# Patient Record
Sex: FEMALE | Race: WHITE | NOT HISPANIC OR LATINO | Employment: OTHER | URBAN - METROPOLITAN AREA
[De-identification: names, ages, dates, MRNs, and addresses within clinical notes are randomized per-mention and may not be internally consistent; named-entity substitution may affect disease eponyms.]

---

## 2018-04-16 ENCOUNTER — TRANSFERRED RECORDS (OUTPATIENT)
Dept: HEALTH INFORMATION MANAGEMENT | Facility: CLINIC | Age: 63
End: 2018-04-16

## 2018-08-02 ENCOUNTER — TRANSFERRED RECORDS (OUTPATIENT)
Dept: HEALTH INFORMATION MANAGEMENT | Facility: CLINIC | Age: 63
End: 2018-08-02

## 2018-08-02 ENCOUNTER — OFFICE VISIT (OUTPATIENT)
Dept: FAMILY MEDICINE | Facility: CLINIC | Age: 63
End: 2018-08-02
Payer: COMMERCIAL

## 2018-08-02 ENCOUNTER — TELEPHONE (OUTPATIENT)
Dept: FAMILY MEDICINE | Facility: CLINIC | Age: 63
End: 2018-08-02

## 2018-08-02 VITALS
BODY MASS INDEX: 43.77 KG/M2 | HEART RATE: 80 BPM | TEMPERATURE: 98.6 F | DIASTOLIC BLOOD PRESSURE: 86 MMHG | RESPIRATION RATE: 20 BRPM | WEIGHT: 247 LBS | SYSTOLIC BLOOD PRESSURE: 154 MMHG | HEIGHT: 63 IN

## 2018-08-02 DIAGNOSIS — Z53.20 PAP SMEAR OF CERVIX DECLINED: ICD-10-CM

## 2018-08-02 DIAGNOSIS — E66.01 MORBID OBESITY WITH BMI OF 40.0-44.9, ADULT (H): ICD-10-CM

## 2018-08-02 DIAGNOSIS — R81 GLUCOSURIA: ICD-10-CM

## 2018-08-02 DIAGNOSIS — Z53.20 MAMMOGRAM DECLINED: ICD-10-CM

## 2018-08-02 DIAGNOSIS — I10 BENIGN ESSENTIAL HYPERTENSION: ICD-10-CM

## 2018-08-02 DIAGNOSIS — R30.0 DYSURIA: Primary | ICD-10-CM

## 2018-08-02 DIAGNOSIS — Z53.20 COLONOSCOPY REFUSED: ICD-10-CM

## 2018-08-02 DIAGNOSIS — Z72.0 TOBACCO USE: ICD-10-CM

## 2018-08-02 PROBLEM — J98.01 BRONCHOSPASM: Status: RESOLVED | Noted: 2018-08-02 | Resolved: 2018-08-02

## 2018-08-02 PROBLEM — J98.01 BRONCHOSPASM: Status: ACTIVE | Noted: 2018-08-02

## 2018-08-02 PROBLEM — J30.1 CHRONIC SEASONAL ALLERGIC RHINITIS DUE TO POLLEN: Status: ACTIVE | Noted: 2018-08-02

## 2018-08-02 LAB
ALBUMIN SERPL-MCNC: 2.5 G/DL (ref 3.4–5)
ALBUMIN UR-MCNC: 30 MG/DL
ALP SERPL-CCNC: 286 U/L (ref 40–150)
ALT SERPL W P-5'-P-CCNC: 161 U/L (ref 0–50)
ANION GAP SERPL CALCULATED.3IONS-SCNC: 8 MMOL/L (ref 3–14)
APPEARANCE UR: CLEAR
AST SERPL W P-5'-P-CCNC: 76 U/L (ref 0–45)
BACTERIA #/AREA URNS HPF: ABNORMAL /HPF
BILIRUB SERPL-MCNC: 14.9 MG/DL (ref 0.2–1.3)
BILIRUB UR QL STRIP: ABNORMAL
BUN SERPL-MCNC: 14 MG/DL (ref 7–30)
CALCIUM SERPL-MCNC: 8.4 MG/DL (ref 8.5–10.1)
CHLORIDE SERPL-SCNC: 100 MMOL/L (ref 94–109)
CO2 SERPL-SCNC: 27 MMOL/L (ref 20–32)
COLOR UR AUTO: ABNORMAL
CREAT SERPL-MCNC: 0.5 MG/DL (ref 0.52–1.04)
GFR SERPL CREATININE-BSD FRML MDRD: >90 ML/MIN/1.7M2
GLUCOSE BLD-MCNC: 93 MG/DL (ref 70–99)
GLUCOSE SERPL-MCNC: 92 MG/DL (ref 70–99)
GLUCOSE UR STRIP-MCNC: 100 MG/DL
HBA1C MFR BLD: 5.6 % (ref 0–5.6)
HGB UR QL STRIP: NEGATIVE
KETONES UR STRIP-MCNC: NEGATIVE MG/DL
LEUKOCYTE ESTERASE UR QL STRIP: NEGATIVE
NITRATE UR QL: NEGATIVE
PH UR STRIP: 6 PH (ref 5–7)
POTASSIUM SERPL-SCNC: 3 MMOL/L (ref 3.4–5.3)
PROT SERPL-MCNC: 7 G/DL (ref 6.8–8.8)
RBC #/AREA URNS AUTO: ABNORMAL /HPF
SODIUM SERPL-SCNC: 135 MMOL/L (ref 133–144)
SOURCE: ABNORMAL
SP GR UR STRIP: 1.02 (ref 1–1.03)
TSH SERPL DL<=0.005 MIU/L-ACNC: 0.98 MU/L (ref 0.4–4)
UROBILINOGEN UR STRIP-ACNC: 1 EU/DL (ref 0.2–1)
WBC #/AREA URNS AUTO: ABNORMAL /HPF

## 2018-08-02 PROCEDURE — 82947 ASSAY GLUCOSE BLOOD QUANT: CPT | Mod: 59 | Performed by: NURSE PRACTITIONER

## 2018-08-02 PROCEDURE — 81001 URINALYSIS AUTO W/SCOPE: CPT | Performed by: NURSE PRACTITIONER

## 2018-08-02 PROCEDURE — 84443 ASSAY THYROID STIM HORMONE: CPT | Performed by: NURSE PRACTITIONER

## 2018-08-02 PROCEDURE — 83036 HEMOGLOBIN GLYCOSYLATED A1C: CPT | Performed by: NURSE PRACTITIONER

## 2018-08-02 PROCEDURE — 36415 COLL VENOUS BLD VENIPUNCTURE: CPT | Performed by: NURSE PRACTITIONER

## 2018-08-02 PROCEDURE — 87086 URINE CULTURE/COLONY COUNT: CPT | Performed by: NURSE PRACTITIONER

## 2018-08-02 PROCEDURE — 99204 OFFICE O/P NEW MOD 45 MIN: CPT | Performed by: NURSE PRACTITIONER

## 2018-08-02 PROCEDURE — 80053 COMPREHEN METABOLIC PANEL: CPT | Performed by: NURSE PRACTITIONER

## 2018-08-02 RX ORDER — HYDROCHLOROTHIAZIDE 12.5 MG/1
25 TABLET ORAL DAILY
Qty: 30 TABLET | Refills: 0 | Status: SHIPPED | OUTPATIENT
Start: 2018-08-02 | End: 2018-08-21

## 2018-08-02 RX ORDER — ALBUTEROL SULFATE 90 UG/1
2 AEROSOL, METERED RESPIRATORY (INHALATION) EVERY 6 HOURS
Qty: 1 INHALER | Refills: 0 | Status: CANCELLED | OUTPATIENT
Start: 2018-08-02

## 2018-08-02 RX ORDER — LORATADINE 10 MG/1
10 TABLET ORAL DAILY
COMMUNITY

## 2018-08-02 NOTE — NURSING NOTE
"Chief Complaint   Patient presents with     Urinary Problem       Initial /86 (BP Location: Right arm, Patient Position: Sitting, Cuff Size: Adult Large)  Pulse 80  Temp 98.6  F (37  C) (Tympanic)  Resp 20  Ht 5' 3\" (1.6 m)  Wt 247 lb (112 kg)  BMI 43.75 kg/m2 Estimated body mass index is 43.75 kg/(m^2) as calculated from the following:    Height as of this encounter: 5' 3\" (1.6 m).    Weight as of this encounter: 247 lb (112 kg).      Health Maintenance that is potentially due pending provider review:  Mammogram, Pap Smear and Colonoscopy/FIT    Pt declines all.    Is there anyone who you would like to be able to receive your results? No  If yes have patient fill out LEONA    "

## 2018-08-02 NOTE — TELEPHONE ENCOUNTER
Received records from [facility]. Given to Mouna to review.    Tamar [facility] [place]

## 2018-08-02 NOTE — MR AVS SNAPSHOT
After Visit Summary   8/2/2018    Marlin Harman    MRN: [id number]           Patient Information     Date Of Birth          1955        Visit Information        Provider Department      8/2/2018 11:00 AM Mouna Concepcion CNP [facility]        Today's Diagnoses     Dysuria    -  1    Benign essential hypertension        Morbid obesity with BMI of 40.0-44.9, adult (H)        Tobacco use        Glucosuria          Care Instructions    1. Dysuria  Acute  - *UA reflex to Microscopic and Culture ([facility] (except [place] and [place])  - Urine Microscopic  - Wet prep  - Urine Culture Aerobic Bacterial  - Pyridum or AZO over-the-counter     2. Benign essential hypertension  Chronic, uncontrolled  - TSH with free T4 reflex  - hydrochlorothiazide 12.5 MG TABS tablet; Take 2 tablets (25 mg) by mouth daily NO FURTHER REFILL  Dispense: 30 tablet; Refill: 0  - Comprehensive metabolic panel    3. Morbid obesity with BMI of 40.0-44.9, adult (H)  Chronic, stable  Recommend regular activity    4. Tobacco use  Chronic, stable  Recommend smoking cessation    5. Glucosuria  Acute  - Hemoglobin A1c  - Glucose, whole blood  Results for orders placed or performed in visit on 08/02/18   *UA reflex to Microscopic and Culture ([facility] (except [place] and [place])   Result Value Ref Range    Color Urine Irma     Appearance Urine Clear     Glucose Urine 100 (A) NEG^Negative mg/dL    Bilirubin Urine Large (A) NEG^Negative    Ketones Urine Negative NEG^Negative mg/dL    Specific Gravity Urine 1.020 1.003 - 1.035    Blood Urine Negative NEG^Negative    pH Urine 6.0 5.0 - 7.0 pH    Protein Albumin Urine 30 (A) NEG^Negative mg/dL    Urobilinogen Urine 1.0 0.2 - 1.0 EU/dL    Nitrite Urine Negative NEG^Negative    Leukocyte Esterase Urine Negative NEG^Negative    Source Catheterized Urine    Urine Microscopic   Result Value Ref Range    WBC Urine 0 - 5 OTO5^0 -  "5 /HPF    RBC Urine O - 2 OTO2^O - 2 /HPF    Bacteria Urine Few (A) NEG^Negative /HPF   Hemoglobin A1c   Result Value Ref Range    Hemoglobin A1C 5.6 0 - 5.6 %   Glucose, whole blood   Result Value Ref Range    Glucose Whole Blood 93 70 - 99 mg/dL               Follow-ups after your visit        Who to contact     If you have questions or need follow up information about today's clinic visit or your schedule please contact [facility] directly at [phone].  Normal or non-critical lab and imaging results will be communicated to you by MyChart, letter or phone within 4 business days after the clinic has received the results. If you do not hear from us within 7 days, please contact the clinic through MyChart or phone. If you have a critical or abnormal lab result, we will notify you by phone as soon as possible.  Submit refill requests through [employer] or call your pharmacy and they will forward the refill request to us. Please allow 3 business days for your refill to be completed.          Additional Information About Your Visit        Care EveryWhere ID     This is your Care EveryWhere ID. This could be used by other organizations to access your [facility] medical records  [id number]        Your Vitals Were     Pulse Temperature Respirations Height BMI (Body Mass Index)       80 98.6  F (37  C) (Tympanic) 20 5' 3\" (1.6 m) 43.75 kg/m2        Blood Pressure from Last 3 Encounters:   08/02/18 154/86    Weight from Last 3 Encounters:   08/02/18 247 lb (112 kg)              We Performed the Following     *UA reflex to Microscopic and Culture ([place] [facility] (except [place] and [place])     Comprehensive metabolic panel     Glucose, whole blood     Hemoglobin A1c     TSH with free T4 reflex     Urine Culture Aerobic Bacterial     Urine Microscopic     Wet prep          Today's Medication Changes          These changes are accurate as of 8/2/18  1:16 PM.  If you have any questions, ask your " nurse or doctor.               Start taking these medicines.        Dose/Directions    hydrochlorothiazide 12.5 MG Tabs tablet   Used for:  Benign essential hypertension   Started by:  Mouna Concepcion CNP        Dose:  25 mg   Take 2 tablets (25 mg) by mouth daily NO FURTHER REFILL   Quantity:  30 tablet   Refills:  0            Where to get your medicines      These medications [id number] [place] [place] [facility] [address] [facility] [facility]  [address] , \[facility] [place] [facility]\"" [id number]     Phone:  [phone]     hydrochlorothiazide 12.5 MG Tabs tablet                Primary Care Provider Office Phone # Fax #    Mounakan Concepcion [phone]       [address] [facility] [address]        Equal Access to Services     TIARA MARX AH: Hadii aad ku hadasho Sorogelioali, waaxda luqadaha, qaybta kaalmada adeegyada, paulo alvarez. [facility] [phone].    ATENCIÓN: Si habla español, tiene a lopez disposición servicios gratuitos de asistencia lingüística. [facility] [phone].    We comply with applicable federal civil rights laws and [place] laws. We do not discriminate on the basis of race, color, national origin, age, disability, sex, sexual orientation, or gender identity.            Thank you!     Thank you for choosing [facility]  for your care. Our goal is always to provide you with excellent care. Hearing back from our patients is one way we can continue to improve our services. Please take a few minutes to complete the written survey that you may receive in the mail after your visit with us. Thank you!             Your Updated Medication List - Protect others around you: Learn how to safely use, store and throw away your medicines at www.disposemymeds.org.          This list is accurate as of 8/2/18  1:16 PM.  Always use your most recent med list.                   Brand Name Dispense Instructions for use Diagnosis    hydrochlorothiazide  12.5 MG Tabs tablet     30 tablet    Take 2 tablets (25 mg) by mouth daily NO FURTHER REFILL    Benign essential hypertension       loratadine 10 MG tablet    CLARITIN     Take 10 mg by mouth daily

## 2018-08-02 NOTE — NURSING NOTE
Patient was catheterized for UA/UC today due to inability to void. Obtained sample and sent with verification of name and date of birth. Patient tolerated well.    CHRISSY Sepulveda

## 2018-08-02 NOTE — LETTER
August 7, 2018      Marlin Harman  [address] [place] [address]        Dear ,    We are writing to inform you of your test results.    TSH (thyroid)- normal   CNP- abnormal liver function studies   Urinalysis- no bacteria   HgbA1c- normal   Glucose- normal    Resulted Orders   *UA reflex to Microscopic and Culture ([place] [facility] (except [place] and [place])   Result Value Ref Range    Color Urine Irma     Appearance Urine Clear     Glucose Urine 100 (A) NEG^Negative mg/dL    Bilirubin Urine Large (A) NEG^Negative      Comment:      This is an unconfirmed screening test result. A positive result may be false.    Ketones Urine Negative NEG^Negative mg/dL    Specific Gravity Urine 1.020 1.003 - 1.035    Blood Urine Negative NEG^Negative    pH Urine 6.0 5.0 - 7.0 pH    Protein Albumin Urine 30 (A) NEG^Negative mg/dL    Urobilinogen Urine 1.0 0.2 - 1.0 EU/dL    Nitrite Urine Negative NEG^Negative    Leukocyte Esterase Urine Negative NEG^Negative    Source Catheterized Urine    TSH with free T4 reflex   Result Value Ref Range    TSH 0.98 0.40 - 4.00 mU/L   Comprehensive metabolic panel   Result Value Ref Range    Sodium 135 133 - 144 mmol/L    Potassium 3.0 (L) 3.4 - 5.3 mmol/L    Chloride 100 94 - 109 mmol/L    Carbon Dioxide 27 20 - 32 mmol/L    Anion Gap 8 3 - 14 mmol/L    Glucose 92 70 - 99 mg/dL      Comment:      Non Fasting    Urea Nitrogen 14 7 - 30 mg/dL    Creatinine 0.50 (L) 0.52 - 1.04 mg/dL    GFR Estimate >90 >60 mL/min/1.7m2      Comment:      Non  GFR Calc    GFR Estimate If Black >90 >60 mL/min/1.7m2      Comment:       GFR Calc    Calcium 8.4 (L) 8.5 - 10.1 mg/dL    Bilirubin Total 14.9 (H) 0.2 - 1.3 mg/dL    Albumin 2.5 (L) 3.4 - 5.0 g/dL    Protein Total 7.0 6.8 - 8.8 g/dL    Alkaline Phosphatase 286 (H) 40 - 150 U/L     (H) 0 - 50 U/L    AST 76 (H) 0 - 45 U/L   Urine Microscopic   Result Value Ref Range    WBC Urine  0 - 5 OTO5^0 - 5 /HPF    RBC Urine O - 2 OTO2^O - 2 /HPF    Bacteria Urine Few (A) NEG^Negative /HPF   Hemoglobin A1c   Result Value Ref Range    Hemoglobin A1C 5.6 0 - 5.6 %      Comment:      Normal <5.7% Prediabetes 5.7-6.4%  Diabetes 6.5% or higher - adopted from ADA   consensus guidelines.     Glucose, whole blood   Result Value Ref Range    Glucose Whole Blood 93 70 - 99 mg/dL   Urine Culture Aerobic Bacterial   Result Value Ref Range    Specimen Description Catheterized Urine     Special Requests Specimen received in preservative     Culture Micro No growth        If you have any questions or concerns, please call the clinic at the number listed above.       Sincerely,        Mouna Concepcion CNP/jorge

## 2018-08-02 NOTE — PROGRESS NOTES
"  SUBJECTIVE:   Marlin Harman is a 62 year old female NEW PATIENT who presents to clinic today for the following health issues:  Has been out of  hydrochlorothiazide 25 mg daily for 6 months     STAYING IN [place] FOR 1 MONTH    61 yo female c/o dysuria for 1 week with some hematuria. She is unable to void today after an hour and half, and we got a catheterized sample. She notes she can't void on demand (shy bladder).  Has a history of UTI- 4 years ago.  PMHX: hypertension, bronchitis in winter 2017, smoking, obesity. Patient declines preventative screenings. She does not have a regular provider. She is staying with  her brother in [facility]. She notes her last OV was \"quite some time ago\"... [facility]?- got LEONA signed (only an ER visit for shortness of breath- given Duonebs. She will return to the [facility] in a month.       URINARY TRACT SYMPTOMS    Duration: 1 week ago noticed uncomfortable urination    Description  dysuria and hematuria this morning only    Intensity:  Varies     Accompanying signs and symptoms:  Fever/chills: YES- chills in the evening   Flank pain no   Nausea and vomiting: no   Vaginal symptoms: none  Abdominal/Pelvic Pain: YES- with pale floating bowel movements (Had a stomach bug that the kids had). No diarrhea in 4 days, not eating normally yet    History  History of frequent UTI's: no   History of kidney stones: no   Sexually Active: no   Possibility of pregnancy: No    Precipitating or alleviating factors: None    Therapies tried and outcome: cranberry extract pills 2 twice daily and increased fluids.  Outcome: No relief      She doesn't drink alcohol  No history of Hepatitis infection, no drug use or needle sharing  She likes coffee      HPI:   PCP:  Mouna Concepcion, [facility] [phone]    Patient Active Problem List   Diagnosis     Chronic seasonal allergic rhinitis due to pollen     Morbid obesity with BMI of 40.0-44.9, adult (H)     Benign essential hypertension     " "Tobacco use     Current Outpatient Prescriptions   Medication     hydrochlorothiazide 12.5 MG TABS tablet     loratadine (CLARITIN) 10 MG tablet     No current facility-administered medications for this visit.        Health Maintenance Due   Topic Date Due     PHQ-2 Q1 YR  12/01/1965     TETANUS IMMUNIZATION (SYSTEM ASSIGNED)  12/01/1971     HIV SCREEN (SYSTEM ASSIGNED)  12/01/1971     HEPATITIS C SCREENING  12/01/1971     PAP SCREENING Q3 YR (SYSTEM ASSIGNED)  12/01/1974     LIPID SCREEN Q5 YR FEMALE (SYSTEM ASSIGNED)  12/01/1998     MAMMO SCREEN Q2 YR (SYSTEM ASSIGNED)  12/01/2003     COLON CANCER SCREEN (SYSTEM ASSIGNED)  12/01/2003     ADVANCE DIRECTIVE PLANNING Q5 YRS  12/01/2008       Reviewed and updated:  Tobacco  Allergies  Meds  Med Hx  Surg Hx  Fam Hx  Soc Hx     ROS:  Constitutional, neuro, ENT, endocrine, pulmonary, cardiac, gastrointestinal, genitourinary, musculoskeletal, integument and psychiatric systems are negative, except as otherwise noted.  CONSTITUTIONAL:obese  RESP:smoking  CV: Hx HTN  GI: diarrhea  : dysuria    PHYSICAL EXAM:   /86 (BP Location: Right arm, Patient Position: Sitting, Cuff Size: Adult Large)  Pulse 80  Temp 98.6  F (37  C) (Tympanic)  Resp 20  Ht 5' 3\" (1.6 m)  Wt 247 lb (112 kg)  BMI 43.75 kg/m2  Body mass index is 43.75 kg/(m^2).  GENERAL APPEARANCE: healthy, alert, no distress and obese  EYES: PUPILS ROUND AND REACTIVE TO LIGHT AND ACCOMODATION, scleral icterus  NECK: no adenopathy, no asymmetry, masses, or scars and thyroid normal to palpation  RESP: lungs clear to auscultation - no rales, rhonchi or wheezes  CV: regular rates and rhythm, normal S1 S2, no S3 or S4 and no murmur, click or rub  ABDOMEN: soft, nontender, without hepatosplenomegaly or masses and bowel sounds normal, no CVA tenderness  MS: extremities normal- no gross deformities noted  SKIN: no suspicious lesions or rashes  PSYCH: mentation appears normal and affect normal/bright    Results " for orders placed or performed in visit on 08/02/18   *UA reflex to Microscopic and Culture ([place] [facility] (except [place] and [place])   Result Value Ref Range    Color Urine Irma     Appearance Urine Clear     Glucose Urine 100 (A) NEG^Negative mg/dL    Bilirubin Urine Large (A) NEG^Negative    Ketones Urine Negative NEG^Negative mg/dL    Specific Gravity Urine 1.020 1.003 - 1.035    Blood Urine Negative NEG^Negative    pH Urine 6.0 5.0 - 7.0 pH    Protein Albumin Urine 30 (A) NEG^Negative mg/dL    Urobilinogen Urine 1.0 0.2 - 1.0 EU/dL    Nitrite Urine Negative NEG^Negative    Leukocyte Esterase Urine Negative NEG^Negative    Source Catheterized Urine    Urine Microscopic   Result Value Ref Range    WBC Urine 0 - 5 OTO5^0 - 5 /HPF    RBC Urine O - 2 OTO2^O - 2 /HPF    Bacteria Urine Few (A) NEG^Negative /HPF   Hemoglobin A1c   Result Value Ref Range    Hemoglobin A1C 5.6 0 - 5.6 %   Glucose, whole blood   Result Value Ref Range    Glucose Whole Blood 93 70 - 99 mg/dL       ASSESSMENT & PLAN:     1. Dysuria  Acute  - *UA reflex to Microscopic and Culture ([facility] [facility] (except [place] and [place])  - Urine Microscopic  - Wet prep: she walked out and we didn't get this done  - Urine Culture Aerobic Bacterial  - Pyridum or AZO over-the-counter     2. Benign essential hypertension  Chronic, uncontrolled  - TSH with free T4 reflex  - hydrochlorothiazide 12.5 MG TABS tablet; Take 2 tablets (25 mg) by mouth daily NO FURTHER REFILL  Dispense: 30 tablet; Refill: 0  - Comprehensive metabolic panel    3. Morbid obesity with BMI of 40.0-44.9, adult (H)  Chronic, stable  Recommend regular activity    4. Tobacco use  Chronic, stable  Recommend smoking cessation    5. Glucosuria  Acute  - Hemoglobin A1c  - Glucose, whole blood  Results for orders placed or performed in visit on 08/02/18   *UA reflex to Microscopic and Culture ([facility] [facility] (except [place] and [place])    Result Value Ref Range    Color Urine Irma     Appearance Urine Clear     Glucose Urine 100 (A) NEG^Negative mg/dL    Bilirubin Urine Large (A) NEG^Negative    Ketones Urine Negative NEG^Negative mg/dL    Specific Gravity Urine 1.020 1.003 - 1.035    Blood Urine Negative NEG^Negative    pH Urine 6.0 5.0 - 7.0 pH    Protein Albumin Urine 30 (A) NEG^Negative mg/dL    Urobilinogen Urine 1.0 0.2 - 1.0 EU/dL    Nitrite Urine Negative NEG^Negative    Leukocyte Esterase Urine Negative NEG^Negative    Source Catheterized Urine    Urine Microscopic   Result Value Ref Range    WBC Urine 0 - 5 OTO5^0 - 5 /HPF    RBC Urine O - 2 OTO2^O - 2 /HPF    Bacteria Urine Few (A) NEG^Negative /HPF   Hemoglobin A1c   Result Value Ref Range    Hemoglobin A1C 5.6 0 - 5.6 %   Glucose, whole blood   Result Value Ref Range    Glucose Whole Blood 93 70 - 99 mg/dL     6. Mammogram declined    7. Pap smear of cervix declined    8. Colonoscopy refused      Risks, benefits, side effects and rationale for treatment plan fully discussed with the patient and understanding expressed.    Mouna Concepcion, FNP-[facility]

## 2018-08-02 NOTE — PATIENT INSTRUCTIONS
1. Dysuria  Acute  - *UA reflex to Microscopic and Culture ([place] [facility] (except [place] and [place])  - Urine Microscopic  - Wet prep  - Urine Culture Aerobic Bacterial  - Pyridum or AZO over-the-counter     2. Benign essential hypertension  Chronic, uncontrolled  - TSH with free T4 reflex  - hydrochlorothiazide 12.5 MG TABS tablet; Take 2 tablets (25 mg) by mouth daily NO FURTHER REFILL  Dispense: 30 tablet; Refill: 0  - Comprehensive metabolic panel    3. Morbid obesity with BMI of 40.0-44.9, adult (H)  Chronic, stable  Recommend regular activity    4. Tobacco use  Chronic, stable  Recommend smoking cessation    5. Glucosuria  Acute  - Hemoglobin A1c  - Glucose, whole blood  Results for orders placed or performed in visit on 08/02/18   *UA reflex to Microscopic and Culture ([facility] (except [place] and [place])   Result Value Ref Range    Color Urine Irma     Appearance Urine Clear     Glucose Urine 100 (A) NEG^Negative mg/dL    Bilirubin Urine Large (A) NEG^Negative    Ketones Urine Negative NEG^Negative mg/dL    Specific Gravity Urine 1.020 1.003 - 1.035    Blood Urine Negative NEG^Negative    pH Urine 6.0 5.0 - 7.0 pH    Protein Albumin Urine 30 (A) NEG^Negative mg/dL    Urobilinogen Urine 1.0 0.2 - 1.0 EU/dL    Nitrite Urine Negative NEG^Negative    Leukocyte Esterase Urine Negative NEG^Negative    Source Catheterized Urine    Urine Microscopic   Result Value Ref Range    WBC Urine 0 - 5 OTO5^0 - 5 /HPF    RBC Urine O - 2 OTO2^O - 2 /HPF    Bacteria Urine Few (A) NEG^Negative /HPF   Hemoglobin A1c   Result Value Ref Range    Hemoglobin A1C 5.6 0 - 5.6 %   Glucose, whole blood   Result Value Ref Range    Glucose Whole Blood 93 70 - 99 mg/dL

## 2018-08-03 DIAGNOSIS — R94.5 ABNORMAL RESULTS OF LIVER FUNCTION STUDIES: Primary | ICD-10-CM

## 2018-08-03 DIAGNOSIS — R17 SCLERAL ICTERUS: ICD-10-CM

## 2018-08-03 LAB
BACTERIA SPEC CULT: NO GROWTH
Lab: NORMAL
SPECIMEN SOURCE: NORMAL

## 2018-08-03 NOTE — PROGRESS NOTES
LM on her cell phone to schedule abdominal CT with and without contrast in [place] and Lab only appointment in [place]  TSH (thyroid)- normal  CNP- abnormal liver function studies  Urinalysis- no bacteria  HgbA1c- normal  Glucose- normal    Please send her a copy of these results.   Thanks. SHELLI Crabtree

## 2018-08-06 ENCOUNTER — HOSPITAL ENCOUNTER (OUTPATIENT)
Dept: CT IMAGING | Facility: CLINIC | Age: 63
Discharge: HOME | End: 2018-08-06
Attending: NURSE PRACTITIONER | Admitting: NURSE PRACTITIONER
Payer: COMMERCIAL

## 2018-08-06 DIAGNOSIS — R17 SCLERAL ICTERUS: ICD-10-CM

## 2018-08-06 DIAGNOSIS — R94.5 ABNORMAL RESULTS OF LIVER FUNCTION STUDIES: ICD-10-CM

## 2018-08-06 PROCEDURE — 25000125 ZZHC RX 250: Performed by: RADIOLOGY

## 2018-08-06 PROCEDURE — 25000128 H RX IP 250 OP 636: Performed by: RADIOLOGY

## 2018-08-06 PROCEDURE — 74170 CT ABD WO CNTRST FLWD CNTRST: CPT

## 2018-08-06 RX ORDER — IOPAMIDOL 755 MG/ML
100 INJECTION, SOLUTION INTRAVASCULAR ONCE
Status: COMPLETED | OUTPATIENT
Start: 2018-08-06 | End: 2018-08-06

## 2018-08-06 RX ADMIN — IOPAMIDOL 100 ML: 755 INJECTION, SOLUTION INTRAVENOUS at 13:49

## 2018-08-06 RX ADMIN — SODIUM CHLORIDE 70 ML: 9 INJECTION, SOLUTION INTRAVENOUS at 13:49

## 2018-08-07 DIAGNOSIS — R94.5 ABNORMAL RESULTS OF LIVER FUNCTION STUDIES: ICD-10-CM

## 2018-08-07 DIAGNOSIS — R17 SCLERAL ICTERUS: ICD-10-CM

## 2018-08-07 LAB
APTT PPP: 30 SEC (ref 22–37)
BASOPHILS # BLD AUTO: 0 10E9/L (ref 0–0.2)
BASOPHILS NFR BLD AUTO: 0.3 %
DIFFERENTIAL METHOD BLD: ABNORMAL
EOSINOPHIL # BLD AUTO: 0.3 10E9/L (ref 0–0.7)
EOSINOPHIL NFR BLD AUTO: 2.4 %
ERYTHROCYTE [DISTWIDTH] IN BLOOD BY AUTOMATED COUNT: 17.2 % (ref 10–15)
FERRITIN SERPL-MCNC: 365 NG/ML (ref 8–252)
HCT VFR BLD AUTO: 35.6 % (ref 35–47)
HGB BLD-MCNC: 12.5 G/DL (ref 11.7–15.7)
INR PPP: 1 (ref 0.86–1.14)
IRON SERPL-MCNC: 82 UG/DL (ref 35–180)
LYMPHOCYTES # BLD AUTO: 1.8 10E9/L (ref 0.8–5.3)
LYMPHOCYTES NFR BLD AUTO: 14.8 %
MCH RBC QN AUTO: 29.9 PG (ref 26.5–33)
MCHC RBC AUTO-ENTMCNC: 35.1 G/DL (ref 31.5–36.5)
MCV RBC AUTO: 85 FL (ref 78–100)
MONOCYTES # BLD AUTO: 0.7 10E9/L (ref 0–1.3)
MONOCYTES NFR BLD AUTO: 6.1 %
NEUTROPHILS # BLD AUTO: 9.1 10E9/L (ref 1.6–8.3)
NEUTROPHILS NFR BLD AUTO: 76.4 %
PLATELET # BLD AUTO: 376 10E9/L (ref 150–450)
RBC # BLD AUTO: 4.18 10E12/L (ref 3.8–5.2)
TRANSFERRIN SERPL-MCNC: 186 MG/DL (ref 210–360)
WBC # BLD AUTO: 11.9 10E9/L (ref 4–11)

## 2018-08-07 PROCEDURE — 84466 ASSAY OF TRANSFERRIN: CPT | Performed by: NURSE PRACTITIONER

## 2018-08-07 PROCEDURE — 87389 HIV-1 AG W/HIV-1&-2 AB AG IA: CPT | Performed by: NURSE PRACTITIONER

## 2018-08-07 PROCEDURE — 82728 ASSAY OF FERRITIN: CPT | Performed by: NURSE PRACTITIONER

## 2018-08-07 PROCEDURE — 85004 AUTOMATED DIFF WBC COUNT: CPT | Performed by: NURSE PRACTITIONER

## 2018-08-07 PROCEDURE — 83540 ASSAY OF IRON: CPT | Performed by: NURSE PRACTITIONER

## 2018-08-07 PROCEDURE — 86706 HEP B SURFACE ANTIBODY: CPT | Performed by: NURSE PRACTITIONER

## 2018-08-07 PROCEDURE — 86709 HEPATITIS A IGM ANTIBODY: CPT | Performed by: NURSE PRACTITIONER

## 2018-08-07 PROCEDURE — 85610 PROTHROMBIN TIME: CPT | Performed by: NURSE PRACTITIONER

## 2018-08-07 PROCEDURE — 36415 COLL VENOUS BLD VENIPUNCTURE: CPT | Performed by: NURSE PRACTITIONER

## 2018-08-07 PROCEDURE — 86803 HEPATITIS C AB TEST: CPT | Performed by: NURSE PRACTITIONER

## 2018-08-07 PROCEDURE — 85027 COMPLETE CBC AUTOMATED: CPT | Performed by: NURSE PRACTITIONER

## 2018-08-07 PROCEDURE — 85730 THROMBOPLASTIN TIME PARTIAL: CPT | Performed by: NURSE PRACTITIONER

## 2018-08-07 NOTE — LETTER
August 10, 2018      Marlin Harman  [address]  [place] [address]        Dear ,    We are writing to inform you of your test results.    Everything looked normal, except Ferritin level was elevated.  You will need to follow-up with MRCP in [place]. Please contact [facility]  at [phone].    Resulted Orders   INR   Result Value Ref Range    INR 1.00 0.86 - 1.14   Partial thromboplastin time   Result Value Ref Range    PTT 30 22 - 37 sec   Ferritin   Result Value Ref Range    Ferritin 365 (H) 8 - 252 ng/mL   Transferrin   Result Value Ref Range    Transferrin 186 (L) 210 - 360 mg/dL   Iron   Result Value Ref Range    Iron 82 35 - 180 ug/dL   Hepatitis B Surface Antibody   Result Value Ref Range    Hepatitis B Surface Antibody 0.00 <8.00 m[IU]/mL      Comment:      Nonreactive, No antibody detected when the value is less than 8.00 m[IU]/mL.   Hepatitis C antibody   Result Value Ref Range    Hepatitis C Antibody Nonreactive NR^Nonreactive      Comment:      Assay performance characteristics have not been established for newborns,   infants, and children     HIV Antigen Antibody Combo   Result Value Ref Range    HIV Antigen Antibody Combo Nonreactive NR^Nonreactive          Comment:      HIV-1 p24 Ag & HIV-1/HIV-2 Ab Not Detected   Hepatitis A antibody IgM   Result Value Ref Range    Hepatitis A IgM Bella Nonreactive NR^Nonreactive      Comment:      IgM anti-HAV not detected. Does not exclude the possibility of recent exposure   to or infection with HAV.     Differential   Result Value Ref Range    Diff Method Automated Method     % Neutrophils 76.4 %    % Lymphocytes 14.8 %    % Monocytes 6.1 %    % Eosinophils 2.4 %    % Basophils 0.3 %    Absolute Neutrophil 9.1 (H) 1.6 - 8.3 10e9/L    Absolute Lymphocytes 1.8 0.8 - 5.3 10e9/L    Absolute Monocytes 0.7 0.0 - 1.3 10e9/L    Absolute Eosinophils 0.3 0.0 - 0.7 10e9/L    Absolute Basophils 0.0 0.0 - 0.2 10e9/L    CBC with platelets   Result Value Ref Range    WBC 11.9 (H) 4.0 - 11.0 10e9/L    RBC Count 4.18 3.8 - 5.2 10e12/L    Hemoglobin 12.5 11.7 - 15.7 g/dL    Hematocrit 35.6 35.0 - 47.0 %    MCV 85 78 - 100 fl    MCH 29.9 26.5 - 33.0 pg    MCHC 35.1 31.5 - 36.5 g/dL    RDW 17.2 (H) 10.0 - 15.0 %    Platelet Count 376 150 - 450 10e9/L       If you have any questions or concerns, please call the clinic at the number listed above.       Sincerely,        STU Concepcion NP/xavi

## 2018-08-08 ENCOUNTER — HEALTH MAINTENANCE LETTER (OUTPATIENT)
Age: 63
End: 2018-08-08

## 2018-08-08 LAB
HAV IGM SERPL QL IA: NONREACTIVE
HBV SURFACE AB SERPL IA-ACNC: 0 M[IU]/ML
HCV AB SERPL QL IA: NONREACTIVE
HIV 1+2 AB+HIV1 P24 AG SERPL QL IA: NONREACTIVE

## 2018-08-10 ENCOUNTER — TELEPHONE (OUTPATIENT)
Dept: FAMILY MEDICINE | Facility: CLINIC | Age: 63
End: 2018-08-10

## 2018-08-10 DIAGNOSIS — K76.89 HEPATIC CYST: ICD-10-CM

## 2018-08-10 DIAGNOSIS — Q44.5 BILE DUCT, COMMON, CYSTIC DILATATION: Primary | ICD-10-CM

## 2018-08-10 NOTE — TELEPHONE ENCOUNTER
Forwarded to Mouna for 08-06-18 abd CT and 08-07-18 lab review, further recommendations.  ORLANDO Vera RN

## 2018-08-10 NOTE — TELEPHONE ENCOUNTER
Reason for Call:  Other     Detailed comments: Patient is looking for her lab results - please call pt    Phone Number Patient can be reached at: Home number on file [phone] (home)    Best Time:     Can we leave a detailed message on this number? YES    Call taken on 8/10/2018 at 9:41 AM by Lilo Plascencia

## 2018-08-10 NOTE — PROGRESS NOTES
Left detailed message on phone. Everything looked normal, except Ferritin level was elevated.  She will need to follow-up with MRCP in [place]. Patient is to contact [facility]  at [phone]    Reviewed   Please notify her of these results and send a hard copy if not on Identropyhart.   Thanks. SHELLI Crabtree

## 2018-08-10 NOTE — PROGRESS NOTES
Left detailed message on cell phone answering machine. She'll need further study- MRCP. Patient is to contact [facility]  at [phone]  Please notify her of these results and send a hard copy if not on WhatsNexxt.   Thanks. Mouna Concepcion, MARISAP

## 2018-08-14 ENCOUNTER — HOSPITAL ENCOUNTER (OUTPATIENT)
Dept: MRI IMAGING | Facility: CLINIC | Age: 63
Discharge: HOME | End: 2018-08-14
Attending: NURSE PRACTITIONER | Admitting: NURSE PRACTITIONER
Payer: COMMERCIAL

## 2018-08-14 DIAGNOSIS — K80.50 COMMON BILE DUCT STONE: Primary | ICD-10-CM

## 2018-08-14 DIAGNOSIS — R94.5 ABNORMAL RESULTS OF LIVER FUNCTION STUDIES: ICD-10-CM

## 2018-08-14 DIAGNOSIS — K76.89 HEPATIC CYST: ICD-10-CM

## 2018-08-14 DIAGNOSIS — Q44.5 BILE DUCT, COMMON, CYSTIC DILATATION: ICD-10-CM

## 2018-08-14 PROCEDURE — A9585 GADOBUTROL INJECTION: HCPCS | Performed by: NURSE PRACTITIONER

## 2018-08-14 PROCEDURE — 25000128 H RX IP 250 OP 636: Performed by: NURSE PRACTITIONER

## 2018-08-14 PROCEDURE — 74183 MRI ABD W/O CNTR FLWD CNTR: CPT

## 2018-08-14 RX ORDER — GADOBUTROL 604.72 MG/ML
10 INJECTION INTRAVENOUS ONCE
Status: COMPLETED | OUTPATIENT
Start: 2018-08-14 | End: 2018-08-14

## 2018-08-14 RX ADMIN — GADOBUTROL 10 ML: 604.72 INJECTION INTRAVENOUS at 10:54

## 2018-08-14 NOTE — PROGRESS NOTES
Called and LM on her voicemail today @ 1:32 pm: obstructing proximal common bile duct stone causing intrahepatic biliary dilation. She'll need to call Surgery to schedule for gallbladder removal. [place] [phone].      Please mail her a copy of this test result.   Thanks. SHELLI Crabtree

## 2018-08-14 NOTE — TELEPHONE ENCOUNTER
MRCP today-    Notes Recorded by Mouna Concepcion CNP on 8/14/2018 at 1:32 PM  Called and LM on her voicemail today @ 1:32 pm: obstructing proximal common bile duct stone causing intrahepatic biliary dilation. She'll need to call Surgery to schedule for gallbladder removal. [place] [phone].      Please mail her a copy of this test result.   Thanks. Mouna Concepcion, MARISAP      Spoke with pt who did receive above message, has scheduled appt with surg 08-20-18.  ORLANDO Vera RN

## 2018-08-20 ENCOUNTER — TELEPHONE (OUTPATIENT)
Dept: GASTROENTEROLOGY | Facility: CLINIC | Age: 63
End: 2018-08-20

## 2018-08-20 ENCOUNTER — HOSPITAL ENCOUNTER (OUTPATIENT)
Facility: CLINIC | Age: 63
End: 2018-08-20
Attending: INTERNAL MEDICINE | Admitting: INTERNAL MEDICINE
Payer: COMMERCIAL

## 2018-08-20 ENCOUNTER — OFFICE VISIT (OUTPATIENT)
Dept: SURGERY | Facility: CLINIC | Age: 63
End: 2018-08-20
Payer: COMMERCIAL

## 2018-08-20 ENCOUNTER — APPOINTMENT (OUTPATIENT)
Dept: LAB | Facility: CLINIC | Age: 63
End: 2018-08-20
Payer: COMMERCIAL

## 2018-08-20 VITALS
HEIGHT: 63 IN | BODY MASS INDEX: 43.05 KG/M2 | SYSTOLIC BLOOD PRESSURE: 155 MMHG | DIASTOLIC BLOOD PRESSURE: 75 MMHG | WEIGHT: 243 LBS | RESPIRATION RATE: 16 BRPM | HEART RATE: 76 BPM

## 2018-08-20 DIAGNOSIS — K80.50 BILE DUCT STONE: Primary | ICD-10-CM

## 2018-08-20 DIAGNOSIS — K80.41: Primary | ICD-10-CM

## 2018-08-20 LAB
ALBUMIN SERPL-MCNC: 2.5 G/DL (ref 3.4–5)
ALP SERPL-CCNC: 347 U/L (ref 40–150)
ALT SERPL W P-5'-P-CCNC: 125 U/L (ref 0–50)
ANION GAP SERPL CALCULATED.3IONS-SCNC: 10 MMOL/L (ref 3–14)
AST SERPL W P-5'-P-CCNC: 112 U/L (ref 0–45)
BASOPHILS # BLD AUTO: 0 10E9/L (ref 0–0.2)
BASOPHILS NFR BLD AUTO: 0.2 %
BILIRUB SERPL-MCNC: 13 MG/DL (ref 0.2–1.3)
BUN SERPL-MCNC: 16 MG/DL (ref 7–30)
CALCIUM SERPL-MCNC: 9.2 MG/DL (ref 8.5–10.1)
CHLORIDE SERPL-SCNC: 97 MMOL/L (ref 94–109)
CO2 SERPL-SCNC: 28 MMOL/L (ref 20–32)
CREAT SERPL-MCNC: 0.53 MG/DL (ref 0.52–1.04)
DIFFERENTIAL METHOD BLD: ABNORMAL
EOSINOPHIL # BLD AUTO: 0.2 10E9/L (ref 0–0.7)
EOSINOPHIL NFR BLD AUTO: 1.9 %
ERYTHROCYTE [DISTWIDTH] IN BLOOD BY AUTOMATED COUNT: 18.8 % (ref 10–15)
GFR SERPL CREATININE-BSD FRML MDRD: >90 ML/MIN/1.7M2
GLUCOSE SERPL-MCNC: 88 MG/DL (ref 70–99)
HCT VFR BLD AUTO: 39 % (ref 35–47)
HGB BLD-MCNC: 12.7 G/DL (ref 11.7–15.7)
IMM GRANULOCYTES # BLD: 0.1 10E9/L (ref 0–0.4)
IMM GRANULOCYTES NFR BLD: 0.8 %
LIPASE SERPL-CCNC: 138 U/L (ref 73–393)
LYMPHOCYTES # BLD AUTO: 2.3 10E9/L (ref 0.8–5.3)
LYMPHOCYTES NFR BLD AUTO: 18.6 %
MCH RBC QN AUTO: 31.1 PG (ref 26.5–33)
MCHC RBC AUTO-ENTMCNC: 32.6 G/DL (ref 31.5–36.5)
MCV RBC AUTO: 95 FL (ref 78–100)
MONOCYTES # BLD AUTO: 0.8 10E9/L (ref 0–1.3)
MONOCYTES NFR BLD AUTO: 6.8 %
NEUTROPHILS # BLD AUTO: 8.9 10E9/L (ref 1.6–8.3)
NEUTROPHILS NFR BLD AUTO: 71.7 %
NRBC # BLD AUTO: 0 10*3/UL
NRBC BLD AUTO-RTO: 0 /100
PLATELET # BLD AUTO: 262 10E9/L (ref 150–450)
POTASSIUM SERPL-SCNC: 3 MMOL/L (ref 3.4–5.3)
PROT SERPL-MCNC: 7.5 G/DL (ref 6.8–8.8)
RBC # BLD AUTO: 4.09 10E12/L (ref 3.8–5.2)
SODIUM SERPL-SCNC: 135 MMOL/L (ref 133–144)
WBC # BLD AUTO: 12.4 10E9/L (ref 4–11)

## 2018-08-20 PROCEDURE — 80053 COMPREHEN METABOLIC PANEL: CPT | Performed by: SURGERY

## 2018-08-20 PROCEDURE — 85025 COMPLETE CBC W/AUTO DIFF WBC: CPT | Performed by: SURGERY

## 2018-08-20 PROCEDURE — 36415 COLL VENOUS BLD VENIPUNCTURE: CPT | Performed by: SURGERY

## 2018-08-20 PROCEDURE — 99203 OFFICE O/P NEW LOW 30 MIN: CPT | Performed by: SURGERY

## 2018-08-20 PROCEDURE — 83690 ASSAY OF LIPASE: CPT | Performed by: SURGERY

## 2018-08-20 NOTE — MR AVS SNAPSHOT
"              After Visit Summary   8/20/2018    Marlin Harman    MRN: [id number]           Patient Information     Date Of Birth          1955        Visit Information        Provider Department      8/20/2018 10:45 AM Robert Chaudhry MD [facility]        Today's Diagnoses     Calculus common bile duct with chronic cholecystitis with obstruction    -  1      Care Instructions    [facility] will call you to schedule ERCP.            Follow-ups after your visit        Who to contact     If you have questions or need follow up information about today's clinic visit or your schedule please contact [facility] directly at [phone].  Normal or non-critical lab and imaging results will be communicated to you by MyChart, letter or phone within 4 business days after the clinic has received the results. If you do not hear from us within 7 days, please contact the clinic through MyChart or phone. If you have a critical or abnormal lab result, we will notify you by phone as soon as possible.  Submit refill requests through DocSend or call your pharmacy and they will forward the refill request to us. Please allow 3 business days for your refill to be completed.          Additional Information About Your Visit        Care EveryWhere ID     This is your Care EveryWhere ID. This could be used by other organizations to access your [facility] medical records  [id number]        Your Vitals Were     Pulse Respirations Height BMI (Body Mass Index)          76 16 1.6 m (5' 3\") 43.05 kg/m2         Blood Pressure from Last 3 Encounters:   08/20/18 155/75   08/02/18 154/86    Weight from Last 3 Encounters:   08/20/18 110.2 kg (243 lb)   08/02/18 112 kg (247 lb)              We Performed the Following     CBC with platelets differential     Comprehensive metabolic panel (BMP + Alb, Alk Phos, ALT, AST, Total. Bili, TP)     ERCP     Lipase        Primary Care Provider Office Phone # Fax #    Mouna Hirsch " Jamey [phone]       [address] [facility] [address]        Equal Access to Services     TIARA MARX : Hadii aad ku hadirenebairon Camilo, wadaleda junishmaelha, jessicata kajulianada orlin, paulo lewin hayaan jacibrandon duarte lanakuljacqueline alvarez. So [facility] [phone].    ATENCIÓN: Si habla español, tiene a lopez disposición servicios gratuitos de asistencia lingüística. Llame al [phone].    We comply with applicable federal civil rights laws and [place] laws. We do not discriminate on the basis of race, color, national origin, age, disability, sex, sexual orientation, or gender identity.            Thank you!     Thank you for choosing [facility]  for your care. Our goal is always to provide you with excellent care. Hearing back from our patients is one way we can continue to improve our services. Please take a few minutes to complete the written survey that you may receive in the mail after your visit with us. Thank you!             Your Updated Medication List - Protect others around you: Learn how to safely use, store and throw away your medicines at www.disposemymeds.org.          This list is accurate as of 8/20/18 11:59 AM.  Always use your most recent med list.                   Brand Name Dispense Instructions for use Diagnosis    hydrochlorothiazide 12.5 MG Tabs tablet     30 tablet    Take 2 tablets (25 mg) by mouth daily NO FURTHER REFILL    Benign essential hypertension       loratadine 10 MG tablet    CLARITIN     Take 10 mg by mouth daily

## 2018-08-20 NOTE — NURSING NOTE
"Initial /75 (BP Location: Right arm, Patient Position: Chair, Cuff Size: Adult Regular)  Pulse 76  Resp 16  Ht 1.6 m (5' 3\")  Wt 110.2 kg (243 lb)  BMI 43.05 kg/m2 Estimated body mass index is 43.05 kg/(m^2) as calculated from the following:    Height as of this encounter: 1.6 m (5' 3\").    Weight as of this encounter: 110.2 kg (243 lb). .    Patient is here for a consult for BRITTNEY dickens LPN    "

## 2018-08-20 NOTE — TELEPHONE ENCOUNTER
[facility] Call Center    Phone Message    May a detailed message be left on voicemail: Unknown     Reason for Call: Other: .    Referring/Requesting provider:   Dr. Chaudhry    Clinic contact -  [phone]    Procedure Requested:  ERCP    Requested provider:   N/A    Has patient been evaluated in clinic or had a procedure [facility] provider in the last 5 years:    No     Specific method of sedation requested:   N/A     History and physical within last 30 days?     No    Indication/Reason for procedure:    Bile Duct Obstruction    Is procedure to rule out malignancy or is there concern for underlying malignancy?   No    Requested urgency of procedure:   Routine     Did referring provider place Gastroenterology procedure referral in [facility]  -  No    Is patient is aware of request for procedure and ok to be contacted to schedule?    Yes    Action Taken: Message routed to:  [facility]

## 2018-08-20 NOTE — LETTER
8/20/2018         RE: Marlin Harman  [address]  [place] [address]        Dear Colleague,    Thank you for referring your patient, Marlin Harman, to the [facility]. Please see a copy of my visit note below.    PCP:  Mouna Concepcion    Chief complaint: Gallstones, jaundice    History of Present Illness: Patient is a 62-year-old female who throughout her life is received minimal medical attention. She claims to be pretty healthy. Other than hypertension, she has no other significant medical issues.    Recently, she presented to a local facility for treatment of abdominal pain. She also had some changes in her urine appearance. She was having some dysuria as well. A urinalysis showed a large amount of bilirubin.  Subsequently, she was found to have some significant elevations of her liver functions. Her bilirubin was 14.9.    Alkaline phosphatase, ALT, AST were all elevated as well.    This led to a CT scan which showed biliary ductal dilatation without an obvious cause. An MRI (MRCP) was then performed which showed a 1.3 cm obstructing stone in her proximal common bile duct., Stone was seen in her cystic duct.    She was then referred to surgery for consideration of cholecystectomy.    She continues to be jaundiced. Her urine continues to be dark. She also complains of significant itching. Labs were drawn today which are currently pending.    She has no history of abdominal surgery.    She is a current every day smoker, but is trying to reduce her tobacco use.    Histories:  No past medical history on file.    No past surgical history on file.    Family History   Problem Relation Age of Onset     Influenza/Pneumonia Mother      Substance Abuse Mother      Thyroid Disease Mother      Substance Abuse Father      Liver Cancer Father      Thyroid Disease Sister        Social History   Substance Use Topics     Smoking status: Light Tobacco Smoker     Types: Cigarettes      Smokeless tobacco: Never Used     Alcohol use Yes      Comment: rare        Current Outpatient Prescriptions   Medication Sig Dispense Refill     loratadine (CLARITIN) 10 MG tablet Take 10 mg by mouth daily       hydrochlorothiazide 12.5 MG TABS tablet Take 2 tablets (25 mg) by mouth daily NO FURTHER REFILL (Patient not taking: Reported on 8/20/2018) 30 tablet 0       Allergies   Allergen Reactions     Latex      Mold      Small Pox Vaccine      Sulfa Drugs        Images:  Recent Results (from the past 744 hour(s))   CT Abdomen w/o & w Contrast    Narrative    CT ABDOMEN WITHOUT AND WITH CONTRAST  8/6/2018 2:07 PM    HISTORY:  Abnormal results of liver function studies. Scleral icterus.    COMPARISON: None.    TECHNIQUE: Routine transverse three-phase CT imaging of the abdomen  was performed before and after the uneventful administration of 100 mL  Isovue 370 intravenous contrast. Radiation dose for this scan was  reduced using automated exposure control, adjustment of the mA and/or  kV according to patient size, or iterative reconstruction technique.    FINDINGS: The visualized lung bases are clear. There is  mild-to-moderate intrahepatic biliary ductal dilatation, especially  into the left hepatic lobe. There are also several small areas of  apparent cyst formation within the left hepatic lobe. No definite  solid mass is seen. No definite gallbladder pathology is identified.  Spleen, pancreas, adrenal glands, and kidneys are normal. No enlarged  lymph node or other abnormal mass is demonstrated. No free fluid is  seen. No free intraperitoneal gas is identified. The visualized  portion of the gastrointestinal tract is unremarkable. There is a  normal-appearing appendix. There is calcification in the vascular  structures. There are degenerative changes in the spine. No other  osseous abnormality is identified. No abdominal wall pathology is  demonstrated.       Impression    IMPRESSION: Cystic changes and biliary  ductal dilatation of the left  hepatic lobe. The etiology of this is uncertain. An MRCP may be  beneficial for further evaluation.    NICKOLAS RODGERS MD   MR Abdomen MRCP w/o & w Contrast    Narrative    MR ABDOMEN MRCP WITH AND WITHOUT CONTRAST August 14, 2018 11:25 AM     HISTORY: Biliary ductal dilation with hepatic cysts. Total bilirubin  14.9, painless scleral icterus. Bile duct, common, cystic dilatation.  Hepatic cyst.    COMPARISON: CT abdomen 8/6/2018.    TECHNIQUE: Multisequence, multiplanar imaging is performed through the  biliary system. A total of 10 mL Gadavist is injected intravenously  followed by dynamic axial T1 FAT-SAT sequences.    FINDINGS:     Abdomen: Extensive dilatation of the intrahepatic and proximal common  bile duct is noted due to an obstructing 1.3 cm common bile duct stone  on series 3, image 20 and series 4, image 19. Probable additional  stone in a dilated cystic duct remnant or contracted gallbladder is  also noted. Pancreatic duct is normal in caliber and course. Distal  common bile duct is normal in caliber.    No evidence of fatty liver infiltration or mass. A few scattered  cystic areas are noted in the left hepatic lobe possibly dilated  peripheral biliary channels versus small cysts. A probable liver cyst  measures 1.5 cm on series 14, image 10. The spleen, pancreas, adrenal  glands and kidneys are unremarkable. No hydronephrosis. No enlarged  lymph nodes. Aorta is unremarkable. Bowel appears nondistended. Fecal  debris is noted scattered throughout the colon where visualized.    Following contrast administration, there is no enhancement within the  left hepatic lobe cysts. Upper abdominal organs otherwise enhance  normally. No evidence of pancreatic mass.      Impression    IMPRESSION:  1. Obstructing proximal common bile duct stone causing the  intrahepatic biliary dilatation. The stone measures up to 1.3 cm in  diameter. Additional stone in a cystic duct remnant  versus contracted  gallbladder is also noted and similar in size.  2. Simple cysts left hepatic lobe. No enhancing liver mass or fatty  infiltration. Remaining abdominal organs are within normal limits.    MOSES BALDWIN MD       Labs:  Results for orders placed or performed during the hospital encounter of 08/14/18   MR Abdomen MRCP w/o & w Contrast    Narrative    MR ABDOMEN MRCP WITH AND WITHOUT CONTRAST August 14, 2018 11:25 AM     HISTORY: Biliary ductal dilation with hepatic cysts. Total bilirubin  14.9, painless scleral icterus. Bile duct, common, cystic dilatation.  Hepatic cyst.    COMPARISON: CT abdomen 8/6/2018.    TECHNIQUE: Multisequence, multiplanar imaging is performed through the  biliary system. A total of 10 mL Gadavist is injected intravenously  followed by dynamic axial T1 FAT-SAT sequences.    FINDINGS:     Abdomen: Extensive dilatation of the intrahepatic and proximal common  bile duct is noted due to an obstructing 1.3 cm common bile duct stone  on series 3, image 20 and series 4, image 19. Probable additional  stone in a dilated cystic duct remnant or contracted gallbladder is  also noted. Pancreatic duct is normal in caliber and course. Distal  common bile duct is normal in caliber.    No evidence of fatty liver infiltration or mass. A few scattered  cystic areas are noted in the left hepatic lobe possibly dilated  peripheral biliary channels versus small cysts. A probable liver cyst  measures 1.5 cm on series 14, image 10. The spleen, pancreas, adrenal  glands and kidneys are unremarkable. No hydronephrosis. No enlarged  lymph nodes. Aorta is unremarkable. Bowel appears nondistended. Fecal  debris is noted scattered throughout the colon where visualized.    Following contrast administration, there is no enhancement within the  left hepatic lobe cysts. Upper abdominal organs otherwise enhance  normally. No evidence of pancreatic mass.      Impression    IMPRESSION:  1. Obstructing proximal  "common bile duct stone causing the  intrahepatic biliary dilatation. The stone measures up to 1.3 cm in  diameter. Additional stone in a cystic duct remnant versus contracted  gallbladder is also noted and similar in size.  2. Simple cysts left hepatic lobe. No enhancing liver mass or fatty  infiltration. Remaining abdominal organs are within normal limits.    MOSES BALDWIN MD       ROS:  Constitutional - Denies fevers, weight loss, malaise, lethargy  Neuro - Denies tremors or seizures  Pulmon - Denies SOB, dyspnea, hemoptysis  CV - Denies CP, SOB, lower extremity edema, difficulty w/ stairs  GI - Denies hematemesis, BRBPR, melena, stool has been lighter in color and looser than normal   - Denies hematuria, difficulty voiding, h/o STDs, positive for change in the color of her urine  Hematology - Denies blood clotting disorders, chronic anemias  Dermatology - No melanomas or skin cancers  Rheumatology - No h/o RA  Pysch - Denies depression, bipolar d/o or schizophrenia    /75 (BP Location: Right arm, Patient Position: Chair, Cuff Size: Adult Regular)  Pulse 76  Resp 16  Ht 1.6 m (5' 3\")  Wt 110.2 kg (243 lb)  BMI 43.05 kg/m2    Exam:  General - Alert and Oriented X4, NAD, well nourished  HEENT - Normocephalic, atraumatic  Neck - supple, no LAD,   Lungs -respirations unlabored, chest wall excursion is normal  CV - Heart RRR,   Abdomen - Soft, non-tender, +BS, no hepatosplenomegaly, no palpable masses  Groins deferred  Rectal -deferred  Neuro - Full ROM, Strength 5/5 and major muscle groups, sensation intact  Extremities - No cyanosis, clubbing or edema.      Assessment and Plan: Patient has cholelithiasis and proven choledocholithiasis.  She clearly is still jaundiced although currently labs are pending. She needs rather urgent ERCP to clear the common bile duct. This could be potentially very difficult due to the size. If ERCP fails then she will need a common bile duct exploration. Neither of these " procedures are done here, so I will refer her to the [facility]. If they are able to clear her common duct, she can return here for cholecystectomy. He was placed in the phone call was placed. I will see the patient back if needed for cholecystectomy. I spent about 30 minutes with her discussing the anatomy, pathophysiology, surgical treatment and possible complications of cholecystectomy. All of her questions were answered.            Robert Chaudhry MD FACS            Again, thank you for allowing me to participate in the care of your patient.        Sincerely,        Robert Chaudhry MD

## 2018-08-20 NOTE — TELEPHONE ENCOUNTER
[facility]     Referred to: [facility]     Provider Requested: 8/20/2018     Referral Received: 8/20/2018     Records received: In EPIC      Images received: In EPIC     Evaluation for: ERCP for Bile duct stone     Clinical History (per RN review):    An MRI (MRCP) was then performed which showed a 1.3 cm obstructing stone in her proximal common bile duct., Stone was seen in her cystic duct.    MD review date: 8/20/2018  MD Decision for clinic consultation/Orders:   ERCP and sent to scheduling with Dr. Gomez for tomorrow 8/21/2018         Referral updates/Patient contacted:     Patient called and is amenable to plan as noted and aware of the following:  Procedure explained to patient.  This is a urgent procedure.   Can expect a call for date and time for procedure.   Will need a , someone to stay with them for 24 hours and stay in town for 24 hours (within 45 min of Hospital) post procedure, rational explained.   Will get pre-op physical done locally and will fax a copy to us along with bringing a hard copy with them. Fax number given. [phone]    Blood thinner -  N/A  ASA - yes, she will stop   Diabetic - N/A  NSAIDS: Last Ibuprofen was yesterday.     A pre-op nurse will call 1-2 days prior to the procedure.   Is advised to be NPO/solid food at midnight before the procedure in the event the procedure time is moved up. Ok to drink clear liquids (Water, Apple Juice or Gatorade) up to 6 hours prior to procedure.      Aware RN will call within 2-3 days post procedure.    Verbalized understanding of all instructions. All questions answered.   Contact information verified for future questions/concerns.     Alicia CASAREZ RN Coordinator  Dr. Gomez, Dr. Mckeon & Dr. West   [facility]  [phone]

## 2018-08-20 NOTE — TELEPHONE ENCOUNTER
Marlin is informed that she is scheduled on 08/21/2018 at 330 PM with an arrival timeof 130 AM. This was scheduled as an add-on procedure to follow in Dr. Gomez's room.   Patient has a ride organized and knows to have someone monitor her for at least 24 hours post.  Direct line given to patient to call with any questions or concerns.    SR 08/20/2018 @ 453p

## 2018-08-20 NOTE — PROGRESS NOTES
PCP:  Mouna Concepcion    Chief complaint: Gallstones, jaundice    History of Present Illness: Patient is a 62-year-old female who throughout her life is received minimal medical attention. She claims to be pretty healthy. Other than hypertension, she has no other significant medical issues.    Recently, she presented to a local facility for treatment of abdominal pain. She also had some changes in her urine appearance. She was having some dysuria as well. A urinalysis showed a large amount of bilirubin.  Subsequently, she was found to have some significant elevations of her liver functions. Her bilirubin was 14.9.    Alkaline phosphatase, ALT, AST were all elevated as well.    This led to a CT scan which showed biliary ductal dilatation without an obvious cause. An MRI (MRCP) was then performed which showed a 1.3 cm obstructing stone in her proximal common bile duct., Stone was seen in her cystic duct.    She was then referred to surgery for consideration of cholecystectomy.    She continues to be jaundiced. Her urine continues to be dark. She also complains of significant itching. Labs were drawn today which are currently pending.    She has no history of abdominal surgery.    She is a current every day smoker, but is trying to reduce her tobacco use.    Histories:  No past medical history on file.    No past surgical history on file.    Family History   Problem Relation Age of Onset     Influenza/Pneumonia Mother      Substance Abuse Mother      Thyroid Disease Mother      Substance Abuse Father      Liver Cancer Father      Thyroid Disease Sister        Social History   Substance Use Topics     Smoking status: Light Tobacco Smoker     Types: Cigarettes     Smokeless tobacco: Never Used     Alcohol use Yes      Comment: rare        Current Outpatient Prescriptions   Medication Sig Dispense Refill     loratadine (CLARITIN) 10 MG tablet Take 10 mg by mouth daily       hydrochlorothiazide 12.5 MG TABS tablet  Take 2 tablets (25 mg) by mouth daily NO FURTHER REFILL (Patient not taking: Reported on 8/20/2018) 30 tablet 0       Allergies   Allergen Reactions     Latex      Mold      Small Pox Vaccine      Sulfa Drugs        Images:  Recent Results (from the past 744 hour(s))   CT Abdomen w/o & w Contrast    Narrative    CT ABDOMEN WITHOUT AND WITH CONTRAST  8/6/2018 2:07 PM    HISTORY:  Abnormal results of liver function studies. Scleral icterus.    COMPARISON: None.    TECHNIQUE: Routine transverse three-phase CT imaging of the abdomen  was performed before and after the uneventful administration of 100 mL  Isovue 370 intravenous contrast. Radiation dose for this scan was  reduced using automated exposure control, adjustment of the mA and/or  kV according to patient size, or iterative reconstruction technique.    FINDINGS: The visualized lung bases are clear. There is  mild-to-moderate intrahepatic biliary ductal dilatation, especially  into the left hepatic lobe. There are also several small areas of  apparent cyst formation within the left hepatic lobe. No definite  solid mass is seen. No definite gallbladder pathology is identified.  Spleen, pancreas, adrenal glands, and kidneys are normal. No enlarged  lymph node or other abnormal mass is demonstrated. No free fluid is  seen. No free intraperitoneal gas is identified. The visualized  portion of the gastrointestinal tract is unremarkable. There is a  normal-appearing appendix. There is calcification in the vascular  structures. There are degenerative changes in the spine. No other  osseous abnormality is identified. No abdominal wall pathology is  demonstrated.       Impression    IMPRESSION: Cystic changes and biliary ductal dilatation of the left  hepatic lobe. The etiology of this is uncertain. An MRCP may be  beneficial for further evaluation.    NICKOLAS RODGERS MD   MR Abdomen MRCP w/o & w Contrast    Narrative    MR ABDOMEN MRCP WITH AND WITHOUT CONTRAST  August 14, 2018 11:25 AM     HISTORY: Biliary ductal dilation with hepatic cysts. Total bilirubin  14.9, painless scleral icterus. Bile duct, common, cystic dilatation.  Hepatic cyst.    COMPARISON: CT abdomen 8/6/2018.    TECHNIQUE: Multisequence, multiplanar imaging is performed through the  biliary system. A total of 10 mL Gadavist is injected intravenously  followed by dynamic axial T1 FAT-SAT sequences.    FINDINGS:     Abdomen: Extensive dilatation of the intrahepatic and proximal common  bile duct is noted due to an obstructing 1.3 cm common bile duct stone  on series 3, image 20 and series 4, image 19. Probable additional  stone in a dilated cystic duct remnant or contracted gallbladder is  also noted. Pancreatic duct is normal in caliber and course. Distal  common bile duct is normal in caliber.    No evidence of fatty liver infiltration or mass. A few scattered  cystic areas are noted in the left hepatic lobe possibly dilated  peripheral biliary channels versus small cysts. A probable liver cyst  measures 1.5 cm on series 14, image 10. The spleen, pancreas, adrenal  glands and kidneys are unremarkable. No hydronephrosis. No enlarged  lymph nodes. Aorta is unremarkable. Bowel appears nondistended. Fecal  debris is noted scattered throughout the colon where visualized.    Following contrast administration, there is no enhancement within the  left hepatic lobe cysts. Upper abdominal organs otherwise enhance  normally. No evidence of pancreatic mass.      Impression    IMPRESSION:  1. Obstructing proximal common bile duct stone causing the  intrahepatic biliary dilatation. The stone measures up to 1.3 cm in  diameter. Additional stone in a cystic duct remnant versus contracted  gallbladder is also noted and similar in size.  2. Simple cysts left hepatic lobe. No enhancing liver mass or fatty  infiltration. Remaining abdominal organs are within normal limits.    MOSES BALDWIN MD       Labs:  Results for orders  placed or performed during the hospital encounter of 08/14/18   MR Abdomen MRCP w/o & w Contrast    Narrative    MR ABDOMEN MRCP WITH AND WITHOUT CONTRAST August 14, 2018 11:25 AM     HISTORY: Biliary ductal dilation with hepatic cysts. Total bilirubin  14.9, painless scleral icterus. Bile duct, common, cystic dilatation.  Hepatic cyst.    COMPARISON: CT abdomen 8/6/2018.    TECHNIQUE: Multisequence, multiplanar imaging is performed through the  biliary system. A total of 10 mL Gadavist is injected intravenously  followed by dynamic axial T1 FAT-SAT sequences.    FINDINGS:     Abdomen: Extensive dilatation of the intrahepatic and proximal common  bile duct is noted due to an obstructing 1.3 cm common bile duct stone  on series 3, image 20 and series 4, image 19. Probable additional  stone in a dilated cystic duct remnant or contracted gallbladder is  also noted. Pancreatic duct is normal in caliber and course. Distal  common bile duct is normal in caliber.    No evidence of fatty liver infiltration or mass. A few scattered  cystic areas are noted in the left hepatic lobe possibly dilated  peripheral biliary channels versus small cysts. A probable liver cyst  measures 1.5 cm on series 14, image 10. The spleen, pancreas, adrenal  glands and kidneys are unremarkable. No hydronephrosis. No enlarged  lymph nodes. Aorta is unremarkable. Bowel appears nondistended. Fecal  debris is noted scattered throughout the colon where visualized.    Following contrast administration, there is no enhancement within the  left hepatic lobe cysts. Upper abdominal organs otherwise enhance  normally. No evidence of pancreatic mass.      Impression    IMPRESSION:  1. Obstructing proximal common bile duct stone causing the  intrahepatic biliary dilatation. The stone measures up to 1.3 cm in  diameter. Additional stone in a cystic duct remnant versus contracted  gallbladder is also noted and similar in size.  2. Simple cysts left hepatic  "lobe. No enhancing liver mass or fatty  infiltration. Remaining abdominal organs are within normal limits.    MOSES BALDWIN MD       ROS:  Constitutional - Denies fevers, weight loss, malaise, lethargy  Neuro - Denies tremors or seizures  Pulmon - Denies SOB, dyspnea, hemoptysis  CV - Denies CP, SOB, lower extremity edema, difficulty w/ stairs  GI - Denies hematemesis, BRBPR, melena, stool has been lighter in color and looser than normal   - Denies hematuria, difficulty voiding, h/o STDs, positive for change in the color of her urine  Hematology - Denies blood clotting disorders, chronic anemias  Dermatology - No melanomas or skin cancers  Rheumatology - No h/o RA  Pysch - Denies depression, bipolar d/o or schizophrenia    /75 (BP Location: Right arm, Patient Position: Chair, Cuff Size: Adult Regular)  Pulse 76  Resp 16  Ht 1.6 m (5' 3\")  Wt 110.2 kg (243 lb)  BMI 43.05 kg/m2    Exam:  General - Alert and Oriented X4, NAD, well nourished  HEENT - Normocephalic, atraumatic  Neck - supple, no LAD,   Lungs -respirations unlabored, chest wall excursion is normal  CV - Heart RRR,   Abdomen - Soft, non-tender, +BS, no hepatosplenomegaly, no palpable masses  Groins deferred  Rectal -deferred  Neuro - Full ROM, Strength 5/5 and major muscle groups, sensation intact  Extremities - No cyanosis, clubbing or edema.      Assessment and Plan: Patient has cholelithiasis and proven choledocholithiasis.  She clearly is still jaundiced although currently labs are pending. She needs rather urgent ERCP to clear the common bile duct. This could be potentially very difficult due to the size. If ERCP fails then she will need a common bile duct exploration. Neither of these procedures are done here, so I will refer her to the [facility]. If they are able to clear her common duct, she can return here for cholecystectomy. He was placed in the phone call was placed. I will see the patient back if needed for " cholecystectomy. I spent about 30 minutes with her discussing the anatomy, pathophysiology, surgical treatment and possible complications of cholecystectomy. All of her questions were answered.            Robert Chaudhry MD FACS

## 2018-08-21 ENCOUNTER — HOSPITAL ENCOUNTER (INPATIENT)
Facility: CLINIC | Age: 63
LOS: 2 days | Discharge: HOME | DRG: 445 | End: 2018-08-23
Attending: EMERGENCY MEDICINE | Admitting: INTERNAL MEDICINE
Payer: COMMERCIAL

## 2018-08-21 ENCOUNTER — APPOINTMENT (OUTPATIENT)
Dept: GENERAL RADIOLOGY | Facility: CLINIC | Age: 63
DRG: 445 | End: 2018-08-21
Attending: EMERGENCY MEDICINE
Payer: COMMERCIAL

## 2018-08-21 ENCOUNTER — CARE COORDINATION (OUTPATIENT)
Dept: GASTROENTEROLOGY | Facility: CLINIC | Age: 63
End: 2018-08-21

## 2018-08-21 DIAGNOSIS — K80.50 BILIARY COLIC: ICD-10-CM

## 2018-08-21 DIAGNOSIS — K83.1 OBSTRUCTION OF BILE DUCT (H): Primary | ICD-10-CM

## 2018-08-21 DIAGNOSIS — R17 JAUNDICE: ICD-10-CM

## 2018-08-21 LAB
ALBUMIN SERPL-MCNC: 2.5 G/DL (ref 3.4–5)
ALP SERPL-CCNC: 316 U/L (ref 40–150)
ALT SERPL W P-5'-P-CCNC: 138 U/L (ref 0–50)
ANION GAP SERPL CALCULATED.3IONS-SCNC: 8 MMOL/L (ref 3–14)
AST SERPL W P-5'-P-CCNC: 119 U/L (ref 0–45)
BASOPHILS # BLD AUTO: 0 10E9/L (ref 0–0.2)
BASOPHILS NFR BLD AUTO: 0.3 %
BILIRUB SERPL-MCNC: 11.3 MG/DL (ref 0.2–1.3)
BUN SERPL-MCNC: 18 MG/DL (ref 7–30)
CALCIUM SERPL-MCNC: 9 MG/DL (ref 8.5–10.1)
CHLORIDE SERPL-SCNC: 101 MMOL/L (ref 94–109)
CO2 SERPL-SCNC: 29 MMOL/L (ref 20–32)
CREAT SERPL-MCNC: 0.57 MG/DL (ref 0.52–1.04)
DIFFERENTIAL METHOD BLD: ABNORMAL
EOSINOPHIL # BLD AUTO: 0.2 10E9/L (ref 0–0.7)
EOSINOPHIL NFR BLD AUTO: 2.4 %
ERYTHROCYTE [DISTWIDTH] IN BLOOD BY AUTOMATED COUNT: 18.6 % (ref 10–15)
GFR SERPL CREATININE-BSD FRML MDRD: >90 ML/MIN/1.7M2
GLUCOSE SERPL-MCNC: 87 MG/DL (ref 70–99)
HCT VFR BLD AUTO: 37.9 % (ref 35–47)
HGB BLD-MCNC: 12.4 G/DL (ref 11.7–15.7)
IMM GRANULOCYTES # BLD: 0 10E9/L (ref 0–0.4)
IMM GRANULOCYTES NFR BLD: 0.4 %
LIPASE SERPL-CCNC: 171 U/L (ref 73–393)
LYMPHOCYTES # BLD AUTO: 2.4 10E9/L (ref 0.8–5.3)
LYMPHOCYTES NFR BLD AUTO: 24.4 %
MCH RBC QN AUTO: 31.5 PG (ref 26.5–33)
MCHC RBC AUTO-ENTMCNC: 32.7 G/DL (ref 31.5–36.5)
MCV RBC AUTO: 96 FL (ref 78–100)
MONOCYTES # BLD AUTO: 0.6 10E9/L (ref 0–1.3)
MONOCYTES NFR BLD AUTO: 6 %
NEUTROPHILS # BLD AUTO: 6.5 10E9/L (ref 1.6–8.3)
NEUTROPHILS NFR BLD AUTO: 66.5 %
NRBC # BLD AUTO: 0 10*3/UL
NRBC BLD AUTO-RTO: 0 /100
PLATELET # BLD AUTO: 272 10E9/L (ref 150–450)
POTASSIUM SERPL-SCNC: 3 MMOL/L (ref 3.4–5.3)
PROT SERPL-MCNC: 7.4 G/DL (ref 6.8–8.8)
RBC # BLD AUTO: 3.94 10E12/L (ref 3.8–5.2)
SODIUM SERPL-SCNC: 138 MMOL/L (ref 133–144)
WBC # BLD AUTO: 9.7 10E9/L (ref 4–11)

## 2018-08-21 PROCEDURE — 25000132 ZZH RX MED GY IP 250 OP 250 PS 637: Performed by: INTERNAL MEDICINE

## 2018-08-21 PROCEDURE — 71046 X-RAY EXAM CHEST 2 VIEWS: CPT

## 2018-08-21 PROCEDURE — 12000008 ZZH R&B INTERMEDIATE UMMC

## 2018-08-21 PROCEDURE — 96365 THER/PROPH/DIAG IV INF INIT: CPT | Performed by: EMERGENCY MEDICINE

## 2018-08-21 PROCEDURE — 99285 EMERGENCY DEPT VISIT HI MDM: CPT | Mod: 25 | Performed by: EMERGENCY MEDICINE

## 2018-08-21 PROCEDURE — 36415 COLL VENOUS BLD VENIPUNCTURE: CPT | Performed by: EMERGENCY MEDICINE

## 2018-08-21 PROCEDURE — 87040 BLOOD CULTURE FOR BACTERIA: CPT | Performed by: EMERGENCY MEDICINE

## 2018-08-21 PROCEDURE — 25000128 H RX IP 250 OP 636: Performed by: INTERNAL MEDICINE

## 2018-08-21 PROCEDURE — 99285 EMERGENCY DEPT VISIT HI MDM: CPT | Mod: Z6 | Performed by: EMERGENCY MEDICINE

## 2018-08-21 PROCEDURE — 25000125 ZZHC RX 250: Performed by: INTERNAL MEDICINE

## 2018-08-21 PROCEDURE — 94640 AIRWAY INHALATION TREATMENT: CPT

## 2018-08-21 PROCEDURE — 80053 COMPREHEN METABOLIC PANEL: CPT | Performed by: EMERGENCY MEDICINE

## 2018-08-21 PROCEDURE — 25000128 H RX IP 250 OP 636: Performed by: EMERGENCY MEDICINE

## 2018-08-21 PROCEDURE — 40000275 ZZH STATISTIC RCP TIME EA 10 MIN

## 2018-08-21 PROCEDURE — 99223 1ST HOSP IP/OBS HIGH 75: CPT | Mod: AI | Performed by: INTERNAL MEDICINE

## 2018-08-21 PROCEDURE — 93308 TTE F-UP OR LMTD: CPT | Performed by: EMERGENCY MEDICINE

## 2018-08-21 PROCEDURE — 83690 ASSAY OF LIPASE: CPT | Performed by: EMERGENCY MEDICINE

## 2018-08-21 PROCEDURE — 85025 COMPLETE CBC W/AUTO DIFF WBC: CPT | Performed by: EMERGENCY MEDICINE

## 2018-08-21 PROCEDURE — 94660 CPAP INITIATION&MGMT: CPT

## 2018-08-21 RX ORDER — ONDANSETRON 2 MG/ML
4 INJECTION INTRAMUSCULAR; INTRAVENOUS EVERY 6 HOURS PRN
Status: DISCONTINUED | OUTPATIENT
Start: 2018-08-21 | End: 2018-08-21

## 2018-08-21 RX ORDER — PIPERACILLIN SODIUM, TAZOBACTAM SODIUM 3; .375 G/15ML; G/15ML
3.38 INJECTION, POWDER, LYOPHILIZED, FOR SOLUTION INTRAVENOUS ONCE
Status: COMPLETED | OUTPATIENT
Start: 2018-08-21 | End: 2018-08-21

## 2018-08-21 RX ORDER — PIPERACILLIN SODIUM, TAZOBACTAM SODIUM 3; .375 G/15ML; G/15ML
3.38 INJECTION, POWDER, LYOPHILIZED, FOR SOLUTION INTRAVENOUS EVERY 6 HOURS
Status: DISCONTINUED | OUTPATIENT
Start: 2018-08-21 | End: 2018-08-21

## 2018-08-21 RX ORDER — PROCHLORPERAZINE MALEATE 5 MG
10 TABLET ORAL EVERY 6 HOURS PRN
Status: DISCONTINUED | OUTPATIENT
Start: 2018-08-21 | End: 2018-08-23 | Stop reason: HOSPADM

## 2018-08-21 RX ORDER — METHYLPREDNISOLONE 4 MG
1 TABLET, DOSE PACK ORAL DAILY
COMMUNITY

## 2018-08-21 RX ORDER — NALOXONE HYDROCHLORIDE 0.4 MG/ML
.1-.4 INJECTION, SOLUTION INTRAMUSCULAR; INTRAVENOUS; SUBCUTANEOUS
Status: DISCONTINUED | OUTPATIENT
Start: 2018-08-21 | End: 2018-08-21

## 2018-08-21 RX ORDER — LORATADINE 10 MG/1
10 TABLET ORAL DAILY
Status: DISCONTINUED | OUTPATIENT
Start: 2018-08-21 | End: 2018-08-23 | Stop reason: HOSPADM

## 2018-08-21 RX ORDER — INDOMETHACIN 50 MG/1
100 SUPPOSITORY RECTAL
Status: CANCELLED | OUTPATIENT
Start: 2018-08-21

## 2018-08-21 RX ORDER — LIDOCAINE 40 MG/G
CREAM TOPICAL
Status: CANCELLED | OUTPATIENT
Start: 2018-08-21

## 2018-08-21 RX ORDER — DIPHENHYDRAMINE HCL 25 MG
25 TABLET ORAL
Status: DISCONTINUED | OUTPATIENT
Start: 2018-08-21 | End: 2018-08-21

## 2018-08-21 RX ORDER — NALOXONE HYDROCHLORIDE 0.4 MG/ML
.1-.4 INJECTION, SOLUTION INTRAMUSCULAR; INTRAVENOUS; SUBCUTANEOUS
Status: DISCONTINUED | OUTPATIENT
Start: 2018-08-21 | End: 2018-08-23 | Stop reason: HOSPADM

## 2018-08-21 RX ORDER — ALBUTEROL SULFATE 0.83 MG/ML
2.5 SOLUTION RESPIRATORY (INHALATION)
Status: DISCONTINUED | OUTPATIENT
Start: 2018-08-21 | End: 2018-08-23 | Stop reason: HOSPADM

## 2018-08-21 RX ORDER — IBUPROFEN 600 MG/1
600 TABLET, FILM COATED ORAL EVERY 6 HOURS PRN
Status: DISCONTINUED | OUTPATIENT
Start: 2018-08-21 | End: 2018-08-23 | Stop reason: HOSPADM

## 2018-08-21 RX ORDER — FOLIC ACID 1 MG/1
1 TABLET ORAL DAILY
Status: DISCONTINUED | OUTPATIENT
Start: 2018-08-21 | End: 2018-08-23 | Stop reason: HOSPADM

## 2018-08-21 RX ORDER — ONDANSETRON 2 MG/ML
4 INJECTION INTRAMUSCULAR; INTRAVENOUS EVERY 6 HOURS PRN
Status: DISCONTINUED | OUTPATIENT
Start: 2018-08-21 | End: 2018-08-23 | Stop reason: HOSPADM

## 2018-08-21 RX ORDER — VITAMIN E 268 MG
400 CAPSULE ORAL DAILY
Status: DISCONTINUED | OUTPATIENT
Start: 2018-08-21 | End: 2018-08-23 | Stop reason: HOSPADM

## 2018-08-21 RX ORDER — ONDANSETRON 4 MG/1
4 TABLET, ORALLY DISINTEGRATING ORAL EVERY 6 HOURS PRN
Status: DISCONTINUED | OUTPATIENT
Start: 2018-08-21 | End: 2018-08-21

## 2018-08-21 RX ORDER — SODIUM CHLORIDE 9 MG/ML
INJECTION, SOLUTION INTRAVENOUS CONTINUOUS
Status: ACTIVE | OUTPATIENT
Start: 2018-08-21 | End: 2018-08-21

## 2018-08-21 RX ORDER — PROCHLORPERAZINE 25 MG
25 SUPPOSITORY, RECTAL RECTAL EVERY 12 HOURS PRN
Status: DISCONTINUED | OUTPATIENT
Start: 2018-08-21 | End: 2018-08-23 | Stop reason: HOSPADM

## 2018-08-21 RX ORDER — POTASSIUM CHLORIDE 750 MG/1
40 TABLET, EXTENDED RELEASE ORAL ONCE
Status: COMPLETED | OUTPATIENT
Start: 2018-08-21 | End: 2018-08-21

## 2018-08-21 RX ORDER — PIPERACILLIN SODIUM, TAZOBACTAM SODIUM 3; .375 G/15ML; G/15ML
3.38 INJECTION, POWDER, LYOPHILIZED, FOR SOLUTION INTRAVENOUS EVERY 6 HOURS
Status: DISCONTINUED | OUTPATIENT
Start: 2018-08-21 | End: 2018-08-23 | Stop reason: HOSPADM

## 2018-08-21 RX ORDER — CRANBERRY FRUIT EXTRACT 250 MG
CAPSULE ORAL DAILY
Status: ON HOLD | COMMUNITY
End: 2018-11-29

## 2018-08-21 RX ORDER — ASPIRIN 81 MG/1
81 TABLET, CHEWABLE ORAL DAILY
COMMUNITY

## 2018-08-21 RX ORDER — CYCLOBENZAPRINE HCL 10 MG
10 TABLET ORAL AT BEDTIME
Status: DISCONTINUED | OUTPATIENT
Start: 2018-08-21 | End: 2018-08-23 | Stop reason: HOSPADM

## 2018-08-21 RX ORDER — LIDOCAINE 40 MG/G
CREAM TOPICAL
Status: DISCONTINUED | OUTPATIENT
Start: 2018-08-21 | End: 2018-08-23 | Stop reason: HOSPADM

## 2018-08-21 RX ORDER — FOLIC ACID 1 MG/1
1 TABLET ORAL DAILY
COMMUNITY

## 2018-08-21 RX ORDER — AMOXICILLIN 250 MG
2 CAPSULE ORAL 2 TIMES DAILY
Status: DISCONTINUED | OUTPATIENT
Start: 2018-08-21 | End: 2018-08-23 | Stop reason: HOSPADM

## 2018-08-21 RX ORDER — OXYCODONE HYDROCHLORIDE 5 MG/1
5-10 TABLET ORAL
Status: DISCONTINUED | OUTPATIENT
Start: 2018-08-21 | End: 2018-08-23 | Stop reason: HOSPADM

## 2018-08-21 RX ORDER — DIPHENHYDRAMINE HCL 25 MG
25 CAPSULE ORAL EVERY 6 HOURS PRN
Status: DISCONTINUED | OUTPATIENT
Start: 2018-08-21 | End: 2018-08-23 | Stop reason: HOSPADM

## 2018-08-21 RX ORDER — AMOXICILLIN 250 MG
1 CAPSULE ORAL 2 TIMES DAILY
Status: DISCONTINUED | OUTPATIENT
Start: 2018-08-21 | End: 2018-08-23 | Stop reason: HOSPADM

## 2018-08-21 RX ORDER — HYDROCHLOROTHIAZIDE 25 MG/1
25 TABLET ORAL DAILY
Status: CANCELLED | OUTPATIENT
Start: 2018-08-21

## 2018-08-21 RX ORDER — ONDANSETRON 4 MG/1
4 TABLET, ORALLY DISINTEGRATING ORAL EVERY 6 HOURS PRN
Status: DISCONTINUED | OUTPATIENT
Start: 2018-08-21 | End: 2018-08-23 | Stop reason: HOSPADM

## 2018-08-21 RX ORDER — LABETALOL HYDROCHLORIDE 5 MG/ML
10 INJECTION, SOLUTION INTRAVENOUS EVERY 6 HOURS PRN
Status: DISCONTINUED | OUTPATIENT
Start: 2018-08-21 | End: 2018-08-23 | Stop reason: HOSPADM

## 2018-08-21 RX ORDER — HYDROCHLOROTHIAZIDE 25 MG/1
25 TABLET ORAL DAILY
Status: ON HOLD | COMMUNITY
End: 2018-09-13

## 2018-08-21 RX ORDER — ACETAMINOPHEN 325 MG/1
650 TABLET ORAL EVERY 4 HOURS PRN
Status: DISCONTINUED | OUTPATIENT
Start: 2018-08-21 | End: 2018-08-21

## 2018-08-21 RX ORDER — DIPHENHYDRAMINE HCL 25 MG
25 TABLET ORAL
COMMUNITY

## 2018-08-21 RX ORDER — VITAMIN E 268 MG
400 CAPSULE ORAL DAILY
COMMUNITY

## 2018-08-21 RX ADMIN — IBUPROFEN 600 MG: 600 TABLET, FILM COATED ORAL at 19:33

## 2018-08-21 RX ADMIN — B-COMPLEX W/ C & FOLIC ACID TAB 1 TABLET: TAB at 16:56

## 2018-08-21 RX ADMIN — DIPHENHYDRAMINE HYDROCHLORIDE 25 MG: 25 CAPSULE ORAL at 22:19

## 2018-08-21 RX ADMIN — CYCLOBENZAPRINE HYDROCHLORIDE 10 MG: 10 TABLET, FILM COATED ORAL at 22:19

## 2018-08-21 RX ADMIN — FOLIC ACID 1 MG: 1 TABLET ORAL at 16:56

## 2018-08-21 RX ADMIN — LORATADINE 10 MG: 10 TABLET ORAL at 16:56

## 2018-08-21 RX ADMIN — ALBUTEROL SULFATE 2.5 MG: 2.5 SOLUTION RESPIRATORY (INHALATION) at 19:53

## 2018-08-21 RX ADMIN — POTASSIUM CHLORIDE 40 MEQ: 750 TABLET, EXTENDED RELEASE ORAL at 19:33

## 2018-08-21 RX ADMIN — SODIUM CHLORIDE: 9 INJECTION, SOLUTION INTRAVENOUS at 16:59

## 2018-08-21 RX ADMIN — PIPERACILLIN AND TAZOBACTAM 3.38 G: 3; .375 INJECTION, POWDER, LYOPHILIZED, FOR SOLUTION INTRAVENOUS; PARENTERAL at 13:11

## 2018-08-21 RX ADMIN — PIPERACILLIN SODIUM AND TAZOBACTAM SODIUM 3.38 G: 3; .375 INJECTION, POWDER, LYOPHILIZED, FOR SOLUTION INTRAVENOUS at 19:33

## 2018-08-21 RX ADMIN — VITAMIN E CAP 400 UNIT 400 UNITS: 400 CAP at 16:56

## 2018-08-21 ASSESSMENT — ENCOUNTER SYMPTOMS
DYSURIA: 0
DIFFICULTY URINATING: 0
DIAPHORESIS: 0
FEVER: 1
CHILLS: 0
SHORTNESS OF BREATH: 0
COLOR CHANGE: 0
ABDOMINAL PAIN: 0
ROS SKIN COMMENTS: JAUNDICE
VOMITING: 0
DIARRHEA: 1
NECK STIFFNESS: 0
NAUSEA: 0
NECK PAIN: 0
LIGHT-HEADEDNESS: 0
BACK PAIN: 0
POLYDIPSIA: 0

## 2018-08-21 ASSESSMENT — ACTIVITIES OF DAILY LIVING (ADL): ADLS_ACUITY_SCORE: 13

## 2018-08-21 NOTE — PHARMACY-ADMISSION MEDICATION HISTORY
Admission medication history interview status for the 8/21/2018 admission is complete. See EPIC admission navigator for allergy information, pharmacy, prior to admission medications and immunization status.     Medication history interview sources:  patient    Changes made to PTA medication list (reason)  Added: hydrochlorothiazide 25mg daily per patient   Deleted: duplicate Vitamin B complex + Vitamin C     Additional medication history information (including reliability of information, actions taken by pharmacist): Patient has been holding supplements and medications since 8/19 in anticipation for procedure today. She is a good historian with her medications.     Medication history completed by: Araceli Forbes, PharmD       Prior to Admission medications    Medication Sig Last Dose Taking? Auth Provider   aspirin 81 MG chewable tablet Take 81 mg by mouth daily 8/19/2018 at Unknown time Yes Reported, Patient   Cranberry 250 MG CAPS  8/19/2018 at Unknown time Yes Reported, Patient   diphenhydrAMINE (BENADRYL) 25 MG tablet Take 25 mg by mouth nightly as needed for itching or allergies 8/20/2018 Yes Unknown, Entered By History   folic acid (FOLVITE) 1 MG tablet Take 1 mg by mouth daily 8/19/2018 Yes Unknown, Entered By History   Glucosamine Sulfate 500 MG TABS Take 1 tablet by mouth daily 8/19/2018 Yes Unknown, Entered By History   hydrochlorothiazide (HYDRODIURIL) 25 MG tablet Take 25 mg by mouth daily 8/19/2018 Yes Unknown, Entered By History   loratadine (CLARITIN) 10 MG tablet Take 10 mg by mouth daily 8/20/2018 at Unknown time Yes Reported, Patient   vitamin B complex with vitamin C (VITAMIN  B COMPLEX) TABS tablet Take 1 tablet by mouth daily 8/19/2018 Yes Reported, Patient   vitamin E 400 UNIT capsule Take 400 Units by mouth daily 8/19/2018 Yes Unknown, Entered By History

## 2018-08-21 NOTE — ED NOTES
[facility], [place]   ED Nurse to Floor Handoff     Marlin Harman is a 62 year old female who speaks English and lives with family members,  in a home  They arrived in the ED by car from home    ED Chief Complaint: Jaundice (known gallstones and plan for mirian)    ED Dx;   Final diagnoses:   Jaundice         Needed?: No    Allergies:   Allergies   Allergen Reactions     Citrus Dermatitis     Latex      Mold      Seafood Nausea and Vomiting     Scallops only, all other seafood ok     Small Pox Vaccine      Sulfa Drugs    .  Past Medical Hx: No past medical history on file.   Baseline Mental status: WDL  Current Mental Status changes: at basesline    Infection present or suspected this encounter: yes other unknown, gallstone related? and cultures pending  Sepsis suspected: No  Isolation type: No active isolations     Activity level - Baseline/Home:  Independent  Activity Level - Current:   Independent    Bariatric equipment needed?: No    In the ED these meds were given:   Medications   piperacillin-tazobactam (ZOSYN) 3.375 g vial to attach to  mL bag (3.375 g Intravenous New Bag 8/21/18 1311)       Drips running?  Yes, zosyn    Home pump  No    Current LDAs  Peripheral IV 08/21/18 Left Hand (Active)   Site Assessment WDL 8/21/2018 11:58 AM   Line Status Saline locked 8/21/2018 11:58 AM   Phlebitis Scale 0-->no symptoms 8/21/2018 11:58 AM   Infiltration Scale 0 8/21/2018 11:58 AM   Infiltration Site Treatment Method  None 8/21/2018 11:58 AM   Extravasation? No 8/21/2018 11:58 AM   Number of days:0       Labs results:   Labs Ordered and Resulted from Time of ED Arrival Up to the Time of Departure from the ED   CBC WITH PLATELETS DIFFERENTIAL - Abnormal; Notable for the following:        Result Value    RDW 18.6 (*)     All other components within normal limits   COMPREHENSIVE METABOLIC PANEL - Abnormal; Notable for the following:     Potassium 3.0 (*)     Bilirubin  "Total 11.3 (*)     Albumin 2.5 (*)     Alkaline Phosphatase 316 (*)      (*)      (*)     All other components within normal limits   LIPASE   PERIPHERAL IV CATHETER   BLOOD CULTURE   BLOOD CULTURE       Imaging Studies:   Recent Results (from the past 24 hour(s))   POC US ECHO LIMITED    Impression    Error. Order placed on wrong patient.       Recent vital signs:   /68  Temp 98.6  F (37  C) (Oral)  Ht 1.6 m (5' 3\")  Wt 106.7 kg (235 lb 4.8 oz)  SpO2 99%  BMI 41.68 kg/m2    Cardiac Rhythm: not assessed  Pt needs tele? No  Skin/wound Issues: None    Code Status: unknown    Pain control: pt had none    Nausea control: pt had none    Abnormal labs/tests/findings requiring intervention: ALT/AST    Family present during ED course? No   Family Comments/Social Situation comments: n/a    Tasks needing completion: None    Pt has gallstone obstruction. Had scheduled ERCP today. Was told to come to ED for admission today. Now plan for possible ERCP today or tomorrow. Has had low grade fevers. Zosyn given. ALT/AST elevated, eyes jaundice, pt fatigued. Pt a/ox4, independent.    Karla Scott, RN  [phone] [facility] ED      "

## 2018-08-21 NOTE — ED PROVIDER NOTES
History     Chief Complaint   Patient presents with     Jaundice     known gallstones and plan for mirian     HPI  Marlin Harman is a 62 year old female who presents after referral from Dr. Gomez for evaluation of intermittent low grade temperatures and jaundice. Patient reports symptoms of jaundice since August 1st and per MRI completed on 8/14/18, patient was found to have a 1.3 cm obstructing stone in her proximal common bile duct, for which she has an endoscopic retrograde cholangiopancreatogram scheduled tomorrow. She has also been experiencing intermittent lower grade fevers with associated chills, with a temperature high of 99.4  F. Patient also endorses decreased urine output, intermittent diarrhea, and a baseline, unchanged, productive cough due to a history of smoking. In regards to her diarrhea, the patient reports she has episodes almost every day, though denies episodes both yesterday and today as she has not had much to eat. She denies difficulty urinating.  She denies abdominal pain.  She is a smoker.    MR ABDOMEN MRCP WITH AND WITHOUT CONTRAST August 14, 2018 11:25 AM     IMPRESSION:  1. Obstructing proximal common bile duct stone causing the  intrahepatic biliary dilatation. The stone measures up to 1.3 cm in  diameter. Additional stone in a cystic duct remnant versus contracted  gallbladder is also noted and similar in size.  2. Simple cysts left hepatic lobe. No enhancing liver mass or fatty  infiltration. Remaining abdominal organs are within normal limits.     MOSES BALDWIN MD    No past medical history on file.    No past surgical history on file.    Family History   Problem Relation Age of Onset     Influenza/Pneumonia Mother      Substance Abuse Mother      Thyroid Disease Mother      Rheumatoid Arthritis Mother      Substance Abuse Father      Myocardial Infarction Father      Thyroid Disease Sister      Osteoperosis Sister        Social History   Substance Use Topics     Smoking  status: Light Tobacco Smoker     Types: Cigarettes     Smokeless tobacco: Never Used      Comment: 4-6 cigs/day, smoked since age 18, at most 2 packs per day     Alcohol use Yes      Comment: rare        Current Facility-Administered Medications   Medication     acetaminophen (TYLENOL) tablet 650 mg     albuterol neb solution 2.5 mg     cyclobenzaprine (FLEXERIL) tablet 10 mg     diphenhydrAMINE (BENADRYL) capsule 25 mg     diphenhydrAMINE (BENADRYL) tablet 25 mg     folic acid (FOLVITE) tablet 1 mg     ibuprofen (ADVIL/MOTRIN) tablet 600 mg     lidocaine (LMX4) cream     lidocaine (LMX4) cream     lidocaine 1 % 1 mL     lidocaine 1 % 1 mL     loratadine (CLARITIN) tablet 10 mg     melatonin tablet 1 mg     melatonin tablet 1 mg     naloxone (NARCAN) injection 0.1-0.4 mg     naloxone (NARCAN) injection 0.1-0.4 mg     nicotine polacrilex (NICORETTE) gum 2 mg     ondansetron (ZOFRAN-ODT) ODT tab 4 mg    Or     ondansetron (ZOFRAN) injection 4 mg     ondansetron (ZOFRAN-ODT) ODT tab 4 mg    Or     ondansetron (ZOFRAN) injection 4 mg     oxyCODONE IR (ROXICODONE) tablet 5-10 mg     piperacillin-tazobactam (ZOSYN) 3.375 g vial to attach to  mL bag     piperacillin-tazobactam (ZOSYN) 3.375 g vial to attach to  mL bag     prochlorperazine (COMPAZINE) injection 10 mg    Or     prochlorperazine (COMPAZINE) tablet 10 mg    Or     prochlorperazine (COMPAZINE) Suppository 25 mg     senna-docusate (SENOKOT-S;PERICOLACE) 8.6-50 MG per tablet 1 tablet    Or     senna-docusate (SENOKOT-S;PERICOLACE) 8.6-50 MG per tablet 2 tablet     sodium chloride (PF) 0.9% PF flush 3 mL     sodium chloride (PF) 0.9% PF flush 3 mL     sodium chloride (PF) 0.9% PF flush 3 mL     sodium chloride (PF) 0.9% PF flush 3 mL     sodium chloride 0.9% infusion     vitamin B complex with vitamin C (STRESS TAB) tablet 1 tablet     vitamin E capsule 400 Units        Allergies   Allergen Reactions     Citrus Dermatitis     Latex      Mold       "Seafood Nausea and Vomiting     Scallops only, all other seafood ok     Small Pox Vaccine      Sulfa Drugs          I have reviewed the Medications, Allergies, Past Medical and Surgical History, and Social History in the Epic system.    Review of Systems   Constitutional: Positive for fever. Negative for chills and diaphoresis.   Eyes: Negative for visual disturbance.   Respiratory: Negative for shortness of breath.    Cardiovascular: Negative for chest pain.   Gastrointestinal: Positive for diarrhea ( intermittent). Negative for abdominal pain, nausea and vomiting.   Endocrine: Negative for polydipsia and polyuria.   Genitourinary: Negative for difficulty urinating and dysuria.   Musculoskeletal: Negative for back pain, neck pain and neck stiffness.   Skin: Negative for color change.        Jaundice   Allergic/Immunologic: Negative for immunocompromised state.   Neurological: Negative for light-headedness.   All other systems reviewed and are negative.      Physical Exam   BP: 173/68  Heart Rate: 75  Temp: 98.6  F (37  C)  Resp: 18  Height: 160 cm (5' 3\")  Weight: 106.7 kg (235 lb 4.8 oz)  SpO2: 99 %      Physical Exam   Constitutional: She is oriented to person, place, and time. She appears well-developed and well-nourished. No distress.   HENT:   Head: Normocephalic and atraumatic.   Mouth/Throat: Oropharynx is clear and moist. No oropharyngeal exudate.   Eyes: EOM are normal. Pupils are equal, round, and reactive to light. No scleral icterus.   Bilateral scleral icterus   Neck: Normal range of motion. Neck supple.   Cardiovascular: Normal rate, normal heart sounds and intact distal pulses.    Pulmonary/Chest: Effort normal and breath sounds normal. No respiratory distress. She has no wheezes. She has no rales.   Abdominal: Soft. Bowel sounds are normal. She exhibits no distension. There is no tenderness. There is no rebound and no guarding.   Musculoskeletal: Normal range of motion. She exhibits no edema or " tenderness.   Neurological: She is alert and oriented to person, place, and time. No cranial nerve deficit. She exhibits normal muscle tone. Coordination normal.   Skin: Skin is warm. No rash noted. She is not diaphoretic.   Mild to moderate jaundice.   Psychiatric: She has a normal mood and affect. Her behavior is normal. Judgment and thought content normal.   Nursing note and vitals reviewed.      ED Course     ED Course     Procedures  Results for orders placed during the hospital encounter of 08/21/18   POC US ECHO LIMITED    Impression Error. Order placed on wrong patient.       11:31 AM  The patient was seen and examined by Dr. Baxter in Room 4.          Critical Care time:  none             Labs Ordered and Resulted from Time of ED Arrival Up to the Time of Departure from the ED   CBC WITH PLATELETS DIFFERENTIAL - Abnormal; Notable for the following:        Result Value    RDW 18.6 (*)     All other components within normal limits   COMPREHENSIVE METABOLIC PANEL - Abnormal; Notable for the following:     Potassium 3.0 (*)     Bilirubin Total 11.3 (*)     Albumin 2.5 (*)     Alkaline Phosphatase 316 (*)      (*)      (*)     All other components within normal limits   LIPASE   PERIPHERAL IV CATHETER            Assessments & Plan (with Medical Decision Making)   62-year-old woman sent to Emergency Department for admission by Dr. Gomez.  She has a known 1.3 cm stone in the CBD and he will perform ERCP this evening.  He believes the patient will need admission for pain control, intravenous antibiotics, and possible further intervention due to high chance of difficulties with the procedure and retraction of the stone.  I did obtain laboratory studies and chest x-ray since patient has been having intermittent fevers.  She denies any pain or vomiting.    Patient's laboratory studies returned without any evidence of leukocytosis, WBC is normal at 9700. There is no evidence of anemia, hemoglobin is  normal at 12.4.  Electrolytes show no evidence of dehydration, creatinine is normal at 0.57.  LFTs are elevated and lipase is normal.  Reviewed patient's chest x-ray read the radiology report; no evidence of pneumonia.  Patient was admitted to internal medicine service with GI service following as consultants.      I have reviewed the nursing notes.    I have reviewed the findings, diagnosis, plan and need for follow up with the patient.    Current Discharge Medication List          Final diagnoses:   Jaundice   IChristian, am serving as a trained medical scribe to document services personally performed by Francisco Baxter MD, based on the provider's statements to me.   IFrancisco MD, was physically present and have reviewed and verified the accuracy of this note documented by Christian Lerner.      8/21/2018   [facility], [place], EMERGENCY DEPARTMENT     Leon Baxter MD  08/21/18 0769

## 2018-08-21 NOTE — CONSULTS
GASTROENTEROLOGY CONSULTATION      Date of Admission:  8/21/2018           Reason for Consultation:   We were asked by Dr. Umaña of medicine to evaluate this patient with choledocholithiasis           ASSESSMENT AND RECOMMENDATIONS:   Assessment:  62 year old female with a history of tobacco abuse, obesity, HTN, who GI was consulted on for choledocholithiasis. Presented with painless obstructive jaundice on 8/2 to PCP office with subsequent workup revealing two 1.3 cm stones (one in CBD, one in gallbladder vs cystic duct) causing obstruction and elevated LFTs. Only having low grade temps recently and prior leukocytosis has resolved, so not frankly cholangitic. Regardless, will require intervention for stone removal. May require staged procedure given narrow tapered duct distal to obstruction (inital stent placement and sphincterotomy, followed by more definitive stone removal), however will assess during ERCP.      Recommendations  - Continue IV Zosyn  - Trend LFTs daily  - Check INR in AM  - NPO at midnight, plan for ERCP tomorrow  - Please order GI consult    GI will continue to follow    Thank you for involving us in this patient's care. Please do not hesitate to contact the GI service with any questions or concerns.     Pt care plan discussed with Dr. Mckeon, GI staff physician.    Chente Finley MD  GI Fellow  P: [phone]  -------------------------------------------------------------------------------------------------------------------    History of Present Illness   Marlin Harman is a 62 year old female with a history of tobacco abuse, obesity, HTN, who GI was consulted on for choledocholithiasis. She initially presented to PCP with dark urine that developed 7/30 that she thought was secondary to hematuria and UTI on 8/2. She was told her eyes looked yellow at that time and labs done at that time revealed elevated bili to >14, elevated transaminases, and elevated ALKPhos. Additional symptoms she  notes are light colored stools with intermittent loose stools trigger by eating, last BM yesterday, and low grade temps to 99.4 2 days ago and 99 5 days ago.. No abdominal pain, N/V. Weight has gone down 12 lbs in past few weeks, but otherwise has been stable long term. CT was done at PCP's office which revealed cystic changes and biliary ductal dilatation of the left hepatic lobe of uncertain etiology. F/u MRCP revealed obstructing 1.3 cm proximal common bile duct stone causing the intrahepatic biliary dilatation and additional stone in a cystic duct remnant versus contracted gallbladder of similar size. She was referred to surgery for consideration of cholecystectomy, who referred her here for ERCP, and she was advised to come for admission today given low grade temps. She was started on Zosyn in ED, but otherwise is feeling well.     Past Medical History    HTN  Tobacco abuse  Asthma  Obesity    Past Surgical History   Repair of arm fracture at 6 years old     Social History   Reviewed and edited as appropriate  Social History     Social History     Marital status: Single     Spouse name: N/A     Number of children: N/A     Years of education: N/A     Occupational History     Not on file.     Social History Main Topics     Smoking status: Light Tobacco Smoker     Types: Cigarettes     Smokeless tobacco: Never Used      Comment: 4-6 cigs/day, smoked since age 18, at most 2 packs per day     Alcohol use Yes      Comment: rare      Drug use: No     Sexual activity: Not on file     Other Topics Concern     Not on file     Social History Narrative    Retired, previously worked as a [employer]       Family History     Reviewed and edited as appropriate  Family History   Problem Relation Age of Onset     Influenza/Pneumonia Mother      Substance Abuse Mother      Thyroid Disease Mother      Rheumatoid Arthritis Mother      Substance Abuse Father      Myocardial Infarction Father      Thyroid Disease Sister      Osteoperosis  "Sister      No known history of colorectal cancer, liver disease, or inflammatory bowel disease.    Allergies   Reviewed and edited as appropriate     Allergies   Allergen Reactions     Citrus Dermatitis     Latex      Mold      Seafood Nausea and Vomiting     Scallops only, all other seafood ok     Small Pox Vaccine      Sulfa Drugs         Prior to Admission Medications    Prescriptions Prior to Admission   Medication Sig Dispense Refill Last Dose     aspirin 81 MG chewable tablet Take 81 mg by mouth daily   8/19/2018 at Unknown time     Cranberry 250 MG CAPS    8/19/2018 at Unknown time     diphenhydrAMINE (BENADRYL) 25 MG tablet Take 25 mg by mouth nightly as needed for itching or allergies   8/20/2018     folic acid (FOLVITE) 1 MG tablet Take 1 mg by mouth daily   8/19/2018     Glucosamine Sulfate 500 MG TABS Take 1 tablet by mouth daily   8/19/2018     hydrochlorothiazide (HYDRODIURIL) 25 MG tablet Take 25 mg by mouth daily   8/19/2018     loratadine (CLARITIN) 10 MG tablet Take 10 mg by mouth daily   8/20/2018 at Unknown time     vitamin B complex with vitamin C (VITAMIN  B COMPLEX) TABS tablet Take 1 tablet by mouth daily   8/19/2018     vitamin E 400 UNIT capsule Take 400 Units by mouth daily   8/19/2018      Current Facility-Administered Medications   Medication     albuterol neb solution 2.5 mg        Review of Systems   A complete review of systems was performed and is negative except as noted in the HPI      Physical Exam   /75 (BP Location: Right arm)  Temp 96.5  F (35.8  C) (Oral)  Resp 18  Ht 1.6 m (5' 2.99\")  Wt 106.4 kg (234 lb 8 oz)  SpO2 94%  BMI 41.55 kg/m2  Wt:   Wt Readings from Last 2 Encounters:   08/21/18 106.4 kg (234 lb 8 oz)   08/20/18 110.2 kg (243 lb)      Constitutional: not dyspneic/diaphoretic, no acute distress  Eyes: Sclera icteric  Ears/nose/mouth/throat: Normal oropharynx without ulcers or exudate, mucus membranes moist, hearing intact  Neck: supple, thyroid normal " size  CV: No edema  Respiratory: Unlabored breathing  Lymph: No submandibular, supraclavicular lymphadenopathy  Abd: Nondistended, +bs, no hepatosplenomegaly, nontender, no peritoneal signs  Skin: warm, perfused  Neuro: AAO x 3  Psych: Normal affect  MSK: No gross deformities    Data   Labs and imaging below were independently reviewed and interpreted    BMP    Recent Labs  Lab 08/21/18  1147 08/20/18  1206    135   POTASSIUM 3.0* 3.0*   CHLORIDE 101 97   KEILY 9.0 9.2   CO2 29 28   BUN 18 16   CR 0.57 0.53   GLC 87 88     CBC    Recent Labs  Lab 08/21/18  1147 08/20/18  1206   WBC 9.7 12.4*   RBC 3.94 4.09   HGB 12.4 12.7   HCT 37.9 39.0   MCV 96 95   MCH 31.5 31.1   MCHC 32.7 32.6   RDW 18.6* 18.8*    262     INRNo lab results found in last 7 days.  LFTs    Recent Labs  Lab 08/21/18  1147 08/20/18  1206   ALKPHOS 316* 347*   * 112*   * 125*   BILITOTAL 11.3* 13.0*   PROTTOTAL 7.4 7.5   ALBUMIN 2.5* 2.5*      PANC    Recent Labs  Lab 08/21/18  1147 08/20/18  1206   LIPASE 171 138       Imaging:  CT abdm/pelvis 8/6:  IMPRESSION: Cystic changes and biliary ductal dilatation of the left  hepatic lobe. The etiology of this is uncertain. An MRCP may be  beneficial for further evaluation.    MRCP 8/14:  IMPRESSION:  1. Obstructing proximal common bile duct stone causing the  intrahepatic biliary dilatation. The stone measures up to 1.3 cm in  diameter. Additional stone in a cystic duct remnant versus contracted  gallbladder is also noted and similar in size.  2. Simple cysts left hepatic lobe. No enhancing liver mass or fatty  infiltration. Remaining abdominal organs are within normal limits.

## 2018-08-21 NOTE — PROGRESS NOTES
Dr. Gomez called this RN and wants this patient to come though the ED since she has been having intermittent low grade temps along with jaundice.     Called and spoke to Marlin and she will make her way to the ED. She is currently 2 hours away in [place].   Confirmed address and where to go.   Dr. Gomez aware pt contacted and confirmed she will come through the ED as he requested.     Alicia CASAREZ, RN Coordinator  Dr. Gomez, Dr. Mckeon & Dr. West   [facility]  [phone]

## 2018-08-21 NOTE — ED TRIAGE NOTES
Pt presents from home after receiving a call from the clinic telling her to come to the ED but pt reports she doesn't know why.   Pt was scheduled for an ERCP today at 330pm for known gallstones and was scheduled to check in for this procedure at 130pm.   Pt is in no apparent distress and reports chronic mild abdominal pressure, intermittent low grade evening only fevers of late, and is visabally jaundiced in her eyes and oral mucosa.     /68, HR 74, Sats 97% RA, T 98.6  Pt has been NPO since 0830 when she was told she was allowed to have her last water.   Her last food was last night at 8pm.

## 2018-08-21 NOTE — IP AVS SNAPSHOT
[address] [address]    Phone:  [phone]                                       After Visit Summary   8/21/2018    Marlin Harman    MRN: [id number]           After Visit Summary Signature Page     I have received my discharge instructions, and my questions have been answered. I have discussed any challenges I see with this plan with the nurse or doctor.    ..........................................................................................................................................  Patient/Patient Representative Signature      ..........................................................................................................................................  Patient Representative Print Name and Relationship to Patient    ..................................................               ................................................  Date                                            Time    ..........................................................................................................................................  Reviewed by Signature/Title    ...................................................              ..............................................  Date                                                            Time          22EPIC Rev 08/18

## 2018-08-21 NOTE — IP AVS SNAPSHOT
MRN:[id number]                      After Visit Summary   8/21/2018    Marlin Harman    MRN: [id number]           Thank you!     Thank you for choosing [facility] for your care. Our goal is always to provide you with excellent care. Hearing back from our patients is one way we can continue to improve our services. Please take a few minutes to complete the written survey that you may receive in the mail after you visit with us. Thank you!        Patient Information     Date Of Birth          1955        Designated Caregiver       Most Recent Value    Caregiver    Will someone help with your care after discharge? yes    Name of designated caregiver Don Ament    Phone number of caregiver NA    Caregiver address [place], [place]      About your hospital stay     You were admitted on:  August 21, 2018 You last received care in the:  [address] [facility]    You were discharged on:  August 23, 2018        Reason for your hospital stay       You were hospitalized for concerns of cholangitis associated with a large common bile duct stone and cystic duct stone.  You were started on IV fluids and antibiotics.  GI did an ERCP to place stents to allow appropriate flow of bile.  The cystic duct stone had already passed by the time of the ERCP, however the common bile duct stone was too large to remove.  GI would like to do a repeat ERCP in 4 weeks to break up and fully remove that stone.                  Who to Call     For medical emergencies, please call 911.  For non-urgent questions about your medical care, please call your primary care provider or clinic, [phone]  For questions related to your surgery, please call your surgery clinic        Attending Provider     Provider Specialty    Leon Baxter MD Emergency Medicine    Radha Umaña MD Internal Medicine       Primary Care Provider Office Phone # Fax #    Mouna Concepcion [phone]       When to  contact your care team       Call your primary doctor if you have any of the following: temperature greater than 100.4, increased pain or increasing jaundice.                  After Care Instructions     Activity       Your activity upon discharge: activity as tolerated            Diet       Follow this diet upon discharge: Orders Placed This Encounter      Regular Diet Adult                  Follow-up Appointments     Adult [facility] Follow-up and recommended labs and tests       Follow up with [facility] GI in 4 weeks for repeat ERCP.    Appointments on [place] and/or [facility] (with [facility] or [facility] provider or service). Call [phone] if you haven't heard regarding these appointments within 7 days of discharge.                  Your next 10 appointments already scheduled     Sep 13, 2018   Procedure with Manny Cooper MD   [facility], Same Day Surgery (--)    [address]  [place] [address] [phone]              Further instructions from your care team       [facility] [place] [facility] [place]  Same-Day Surgery   Adult Discharge Orders & Instructions     For 24 hours after surgery    1. Get plenty of rest.  A responsible adult must stay with you for at least 24 hours after you leave the hospital.   2. Do not drive or use heavy equipment.  If you have weakness or tingling, don't drive or use heavy equipment until this feeling goes away.  3. Do not drink alcohol.  4. Avoid strenuous or risky activities.  Ask for help when climbing stairs.   5. You may feel lightheaded.  IF so, sit for a few minutes before standing.  Have someone help you get up.   6. If you have nausea (feel sick to your stomach): Drink only clear liquids such as apple juice, ginger ale, broth or 7-Up.  Rest may also help.  Be sure to drink enough fluids.  Move to a regular diet as you feel able.  7. You may have a slight fever. Call the doctor if your fever is over 100 F (37.7 C) (taken under the tongue) or lasts longer  than 24 hours.  8. You may have a dry mouth, a sore throat, muscle aches or trouble sleeping.  These should go away after 24 hours.  9. Do not make important or legal decisions.   Call your doctor for any of the followin.  Signs of infection (fever, growing tenderness at the surgery site, a large amount of drainage or bleeding, severe pain, foul-smelling drainage, redness, swelling).    2. It has been over 8 to 10 hours since surgery and you are still not able to urinate (pass water).    3.  Headache for over 24 hours.      To contact a doctor, call Dr. Cooper at [phone] at the Gastroenterology Clinic from 8 am till 5 pm or:    x   [phone] and ask for the resident on call for   Gastroenterology (answered 24 hours a day)  x   Emergency Department:    [facility]: [phone]       (TTY for hearing impaired: [phone])              Additional Information     If you use hormonal birth control (such as the pill, patch, ring or implants): You'll need a second form of birth control for 7 days (condoms, a diaphragm or contraceptive foam). While in the hospital, you received a medicine called Bridion. Your normal birth control will not work as well for a week after taking this medicine.          Pending Results     Date and Time Order Name Status Description    2018 1131 Blood culture Preliminary     2018 1131 Blood culture Preliminary             Statement of Approval     Ordered          18 1414  I have reviewed and agree with all the recommendations and orders detailed in this document.  EFFECTIVE NOW     Approved and electronically signed by:  Radha Umaña MD             Admission Information     Date & Time Provider Department Dept. Phone    2018 Radha Umaña MD [address] [facility] [phone]      Your Vitals Were     Blood Pressure Pulse Temperature Respirations Height Weight    121/63 (BP Location: Right arm) 75 96.9  F (36.1  C) (Oral) 18 1.6  "m (5' 2.99\") 106.4 kg (234 lb 8 oz)    Pulse Oximetry BMI (Body Mass Index)                94% 41.55 kg/m2          Care EveryWhere ID     This is your Care EveryWhere ID. This could be used by other organizations to access your [facility] medical records  [id number]        Equal Access to Services     TIRAA MARX : Donnell holloway hadireneo Soomaali, waaxda luqadaha, qaybta kaalmada orlin, paulo pazgakeron alvarez. So [facility] [phone].    ATENCIÓN: Si habla español, tiene a lopez disposición servicios gratuitos de asistencia lingüística. Llame al [phone].    We comply with applicable federal civil rights laws and [place] laws. We do not discriminate on the basis of race, color, national origin, age, disability, sex, sexual orientation, or gender identity.               Review of your medicines      START taking        Dose / Directions    ciprofloxacin 500 MG tablet   Commonly known as:  CIPRO        Dose:  500 mg   Take 1 tablet (500 mg) by mouth 2 times daily for 5 days   Quantity:  10 tablet   Refills:  0       cyclobenzaprine 10 MG tablet   Commonly known as:  FLEXERIL        Dose:  10 mg   Take 1 tablet (10 mg) by mouth At Bedtime   Quantity:  4 tablet   Refills:  0         CONTINUE these medicines which have NOT CHANGED        Dose / Directions    aspirin 81 MG chewable tablet        Dose:  81 mg   Take 81 mg by mouth daily   Refills:  0       Cranberry 250 MG Caps        Refills:  0       diphenhydrAMINE 25 MG tablet   Commonly known as:  BENADRYL        Dose:  25 mg   Take 25 mg by mouth nightly as needed for itching or allergies   Refills:  0       folic acid 1 MG tablet   Commonly known as:  FOLVITE        Dose:  1 mg   Take 1 mg by mouth daily   Refills:  0       Glucosamine Sulfate 500 MG Tabs        Dose:  1 tablet   Take 1 tablet by mouth daily   Refills:  0       hydrochlorothiazide 25 MG tablet   Commonly known as:  HYDRODIURIL        Dose:  25 mg   Take 25 mg by mouth daily "   Refills:  0       loratadine 10 MG tablet   Commonly known as:  CLARITIN        Dose:  10 mg   Take 10 mg by mouth daily   Refills:  0       vitamin B complex with vitamin C Tabs tablet        Dose:  1 tablet   Take 1 tablet by mouth daily   Refills:  0       vitamin E 400 UNIT capsule        Dose:  400 Units   Take 400 Units by mouth daily   Refills:  0            Where to get your medicines      These medications were sent to [facility] - [place], [place] - [address]  [address] [address]     Phone:  [phone]     ciprofloxacin 500 MG tablet    cyclobenzaprine 10 MG tablet                Protect others around you: Learn how to safely use, store and throw away your medicines at www.disposemymeds.org.        ANTIBIOTIC INSTRUCTION     You've Been Prescribed an Antibiotic - Now What?  Your healthcare team thinks that you or your loved one might have an infection. Some infections can be treated with antibiotics, which are powerful, life-saving drugs. Like all medications, antibiotics have side effects and should only be used when necessary. There are some important things you should know about your antibiotic treatment.      Your healthcare team may run tests before you start taking an antibiotic.    Your team may take samples (e.g., from your blood, urine or other areas) to run tests to look for bacteria. These test can be important to determine if you need an antibiotic at all and, if you do, which antibiotic will work best.      Within a few days, your healthcare team might change or even stop your antibiotic.    Your team may start you on an antibiotic while they are working to find out what is making you sick.    Your team might change your antibiotic because test results show that a different antibiotic would be better to treat your infection.    In some cases, once your team has more information, they learn that you do not need an antibiotic at all. They may  find out that you don't have an infection, or that the antibiotic you're taking won't work against your infection. For example, an infection caused by a virus can't be treated with antibiotics. Staying on an antibiotic when you don't need it is more likely to be harmful than helpful.      You may experience side effects from your antibiotic.    Like all medications, antibiotics have side effects. Some of these can be serious.    Let you healthcare team know if you have any known allergies when you are admitted to the hospital.    One significant side effect of nearly all antibiotics is the risk of severe and sometimes deadly diarrhea caused by Clostridium difficile (C. Difficile). This occurs when a person takes antibiotics because some good germs are destroyed. Antibiotic use allows C. diificile to take over, putting patients at high risk for this serious infection.    As a patient or caregiver, it is important to understand your or your loved one's antibiotic treatment. It is especially important for caregivers to speak up when patients can't speak for themselves. Here are some important questions to ask your healthcare team.    What infection is this antibiotic treating and how do you know I have that infection?    What side effects might occur from this antibiotic?    How long will I need to take this antibiotic?    Is it safe to take this antibiotic with other medications or supplements (e.g., vitamins) that I am taking?     Are there any special directions I need to know about taking this antibiotic? For example, should I take it with food?    How will I be monitored to know whether my infection is responding to the antibiotic?    What tests may help to make sure the right antibiotic is prescribed for me?      Information provided by:  www.cdc.gov/getsmart  U.S. Department of Health and Human Services  Centers for disease Control and Prevention  National Center for Emerging and Zoonotic Infectious  Diseases  Division of Healthcare Quality Promotion             Medication List: This is a list of all your medications and when to take them. Check marks below indicate your daily home schedule. Keep this list as a reference.      Medications           Morning Afternoon Evening Bedtime As Needed    aspirin 81 MG chewable tablet   Take 81 mg by mouth daily                                ciprofloxacin 500 MG tablet   Commonly known as:  CIPRO   Take 1 tablet (500 mg) by mouth 2 times daily for 5 days                                Cranberry 250 MG Caps                                cyclobenzaprine 10 MG tablet   Commonly known as:  FLEXERIL   Take 1 tablet (10 mg) by mouth At Bedtime   Last time this was given:  10 mg on 8/22/2018  9:33 PM                                diphenhydrAMINE 25 MG tablet   Commonly known as:  BENADRYL   Take 25 mg by mouth nightly as needed for itching or allergies                                folic acid 1 MG tablet   Commonly known as:  FOLVITE   Take 1 mg by mouth daily   Last time this was given:  1 mg on 8/23/2018  8:07 AM                                Glucosamine Sulfate 500 MG Tabs   Take 1 tablet by mouth daily                                hydrochlorothiazide 25 MG tablet   Commonly known as:  HYDRODIURIL   Take 25 mg by mouth daily                                loratadine 10 MG tablet   Commonly known as:  CLARITIN   Take 10 mg by mouth daily   Last time this was given:  10 mg on 8/23/2018  8:06 AM                                vitamin B complex with vitamin C Tabs tablet   Take 1 tablet by mouth daily   Last time this was given:  1 tablet on 8/23/2018  8:08 AM                                vitamin E 400 UNIT capsule   Take 400 Units by mouth daily   Last time this was given:  400 Units on 8/23/2018  8:07 AM

## 2018-08-22 ENCOUNTER — APPOINTMENT (OUTPATIENT)
Dept: GENERAL RADIOLOGY | Facility: CLINIC | Age: 63
DRG: 445 | End: 2018-08-22
Attending: INTERNAL MEDICINE
Payer: COMMERCIAL

## 2018-08-22 ENCOUNTER — ANESTHESIA EVENT (OUTPATIENT)
Dept: SURGERY | Facility: CLINIC | Age: 63
DRG: 445 | End: 2018-08-22
Payer: COMMERCIAL

## 2018-08-22 ENCOUNTER — DOCUMENTATION ONLY (OUTPATIENT)
Dept: GASTROENTEROLOGY | Facility: CLINIC | Age: 63
End: 2018-08-22

## 2018-08-22 ENCOUNTER — ANESTHESIA (OUTPATIENT)
Dept: SURGERY | Facility: CLINIC | Age: 63
DRG: 445 | End: 2018-08-22
Payer: COMMERCIAL

## 2018-08-22 ENCOUNTER — CARE COORDINATION (OUTPATIENT)
Dept: GASTROENTEROLOGY | Facility: CLINIC | Age: 63
End: 2018-08-22

## 2018-08-22 ENCOUNTER — SURGERY (OUTPATIENT)
Age: 63
End: 2018-08-22

## 2018-08-22 DIAGNOSIS — K83.1 OBSTRUCTION OF BILE DUCT (H): Primary | ICD-10-CM

## 2018-08-22 LAB
ALBUMIN SERPL-MCNC: 2.3 G/DL (ref 3.4–5)
ALP SERPL-CCNC: 280 U/L (ref 40–150)
ALT SERPL W P-5'-P-CCNC: 136 U/L (ref 0–50)
AMYLASE SERPL-CCNC: 26 U/L (ref 30–110)
ANION GAP SERPL CALCULATED.3IONS-SCNC: 9 MMOL/L (ref 3–14)
AST SERPL W P-5'-P-CCNC: 115 U/L (ref 0–45)
BILIRUB SERPL-MCNC: 10.7 MG/DL (ref 0.2–1.3)
BUN SERPL-MCNC: 21 MG/DL (ref 7–30)
CALCIUM SERPL-MCNC: 8.4 MG/DL (ref 8.5–10.1)
CHLORIDE SERPL-SCNC: 107 MMOL/L (ref 94–109)
CO2 SERPL-SCNC: 23 MMOL/L (ref 20–32)
CREAT SERPL-MCNC: 0.62 MG/DL (ref 0.52–1.04)
ERCP: NORMAL
ERYTHROCYTE [DISTWIDTH] IN BLOOD BY AUTOMATED COUNT: 18.8 % (ref 10–15)
GFR SERPL CREATININE-BSD FRML MDRD: >90 ML/MIN/1.7M2
GLUCOSE SERPL-MCNC: 95 MG/DL (ref 70–99)
HCT VFR BLD AUTO: 35 % (ref 35–47)
HGB BLD-MCNC: 11.2 G/DL (ref 11.7–15.7)
INR PPP: 1.04 (ref 0.86–1.14)
LIPASE SERPL-CCNC: 174 U/L (ref 73–393)
MCH RBC QN AUTO: 31.2 PG (ref 26.5–33)
MCHC RBC AUTO-ENTMCNC: 32 G/DL (ref 31.5–36.5)
MCV RBC AUTO: 98 FL (ref 78–100)
PLATELET # BLD AUTO: 253 10E9/L (ref 150–450)
POTASSIUM SERPL-SCNC: 3.4 MMOL/L (ref 3.4–5.3)
PROT SERPL-MCNC: 6.5 G/DL (ref 6.8–8.8)
RBC # BLD AUTO: 3.59 10E12/L (ref 3.8–5.2)
SODIUM SERPL-SCNC: 139 MMOL/L (ref 133–144)
WBC # BLD AUTO: 8.2 10E9/L (ref 4–11)

## 2018-08-22 PROCEDURE — 25000132 ZZH RX MED GY IP 250 OP 250 PS 637: Performed by: INTERNAL MEDICINE

## 2018-08-22 PROCEDURE — 36415 COLL VENOUS BLD VENIPUNCTURE: CPT | Performed by: INTERNAL MEDICINE

## 2018-08-22 PROCEDURE — 40000170 ZZH STATISTIC PRE-PROCEDURE ASSESSMENT II: Performed by: INTERNAL MEDICINE

## 2018-08-22 PROCEDURE — C1874 STENT, COATED/COV W/DEL SYS: HCPCS | Performed by: INTERNAL MEDICINE

## 2018-08-22 PROCEDURE — C9399 UNCLASSIFIED DRUGS OR BIOLOG: HCPCS | Performed by: NURSE ANESTHETIST, CERTIFIED REGISTERED

## 2018-08-22 PROCEDURE — 40000279 XR SURGERY CARM FLUORO GREATER THAN 5 MIN W STILLS: Mod: TC

## 2018-08-22 PROCEDURE — C1876 STENT, NON-COA/NON-COV W/DEL: HCPCS | Performed by: INTERNAL MEDICINE

## 2018-08-22 PROCEDURE — C1877 STENT, NON-COAT/COV W/O DEL: HCPCS | Performed by: INTERNAL MEDICINE

## 2018-08-22 PROCEDURE — 80053 COMPREHEN METABOLIC PANEL: CPT | Performed by: INTERNAL MEDICINE

## 2018-08-22 PROCEDURE — 37000009 ZZH ANESTHESIA TECHNICAL FEE, EACH ADDTL 15 MIN: Performed by: INTERNAL MEDICINE

## 2018-08-22 PROCEDURE — C1769 GUIDE WIRE: HCPCS | Performed by: INTERNAL MEDICINE

## 2018-08-22 PROCEDURE — 25000566 ZZH SEVOFLURANE, EA 15 MIN: Performed by: INTERNAL MEDICINE

## 2018-08-22 PROCEDURE — C1726 CATH, BAL DIL, NON-VASCULAR: HCPCS | Performed by: INTERNAL MEDICINE

## 2018-08-22 PROCEDURE — 37000008 ZZH ANESTHESIA TECHNICAL FEE, 1ST 30 MIN: Performed by: INTERNAL MEDICINE

## 2018-08-22 PROCEDURE — 25000128 H RX IP 250 OP 636: Performed by: NURSE ANESTHETIST, CERTIFIED REGISTERED

## 2018-08-22 PROCEDURE — 25000125 ZZHC RX 250: Performed by: INTERNAL MEDICINE

## 2018-08-22 PROCEDURE — 27210995 ZZH RX 272: Performed by: INTERNAL MEDICINE

## 2018-08-22 PROCEDURE — 25500064 ZZH RX 255 OP 636: Performed by: INTERNAL MEDICINE

## 2018-08-22 PROCEDURE — 25000132 ZZH RX MED GY IP 250 OP 250 PS 637: Performed by: NURSE ANESTHETIST, CERTIFIED REGISTERED

## 2018-08-22 PROCEDURE — 27210794 ZZH OR GENERAL SUPPLY STERILE: Performed by: INTERNAL MEDICINE

## 2018-08-22 PROCEDURE — 25000128 H RX IP 250 OP 636: Performed by: INTERNAL MEDICINE

## 2018-08-22 PROCEDURE — 36000061 ZZH SURGERY LEVEL 3 W FLUORO 1ST 30 MIN - UMMC: Performed by: INTERNAL MEDICINE

## 2018-08-22 PROCEDURE — 25000125 ZZHC RX 250: Performed by: NURSE ANESTHETIST, CERTIFIED REGISTERED

## 2018-08-22 PROCEDURE — 40000275 ZZH STATISTIC RCP TIME EA 10 MIN

## 2018-08-22 PROCEDURE — 82150 ASSAY OF AMYLASE: CPT | Performed by: INTERNAL MEDICINE

## 2018-08-22 PROCEDURE — 94660 CPAP INITIATION&MGMT: CPT

## 2018-08-22 PROCEDURE — 85610 PROTHROMBIN TIME: CPT | Performed by: INTERNAL MEDICINE

## 2018-08-22 PROCEDURE — 12000008 ZZH R&B INTERMEDIATE UMMC

## 2018-08-22 PROCEDURE — 71000014 ZZH RECOVERY PHASE 1 LEVEL 2 FIRST HR: Performed by: INTERNAL MEDICINE

## 2018-08-22 PROCEDURE — 85027 COMPLETE CBC AUTOMATED: CPT | Performed by: INTERNAL MEDICINE

## 2018-08-22 PROCEDURE — 83690 ASSAY OF LIPASE: CPT | Performed by: INTERNAL MEDICINE

## 2018-08-22 PROCEDURE — 0F7D8DZ DILATION OF PANCREATIC DUCT WITH INTRALUMINAL DEVICE, VIA NATURAL OR ARTIFICIAL OPENING ENDOSCOPIC: ICD-10-PCS | Performed by: INTERNAL MEDICINE

## 2018-08-22 PROCEDURE — 36000059 ZZH SURGERY LEVEL 3 EA 15 ADDTL MIN UMMC: Performed by: INTERNAL MEDICINE

## 2018-08-22 PROCEDURE — 99233 SBSQ HOSP IP/OBS HIGH 50: CPT | Performed by: INTERNAL MEDICINE

## 2018-08-22 PROCEDURE — 0F798DZ DILATION OF COMMON BILE DUCT WITH INTRALUMINAL DEVICE, VIA NATURAL OR ARTIFICIAL OPENING ENDOSCOPIC: ICD-10-PCS | Performed by: INTERNAL MEDICINE

## 2018-08-22 DEVICE — STENT FREEMAN PANCREA FLEX 4FRX11CM W/O FLANGE SGL PIGTAIL
Type: IMPLANTABLE DEVICE | Site: PANCREATIC DUCT | Status: NON-FUNCTIONAL
Removed: 2018-09-13

## 2018-08-22 DEVICE — STENT ENDOPROS BILIARY GORE VIABIL W/O HL 10X60MM VN1006200
Type: IMPLANTABLE DEVICE | Site: BILE DUCT | Status: NON-FUNCTIONAL
Removed: 2018-09-13

## 2018-08-22 DEVICE — STENT SOLUS BILIARY 10FRX07CM DBL PIGTAIL W/INTRO G25673
Type: IMPLANTABLE DEVICE | Status: NON-FUNCTIONAL
Removed: 2018-09-13

## 2018-08-22 RX ORDER — NALOXONE HYDROCHLORIDE 0.4 MG/ML
.1-.4 INJECTION, SOLUTION INTRAMUSCULAR; INTRAVENOUS; SUBCUTANEOUS
Status: DISCONTINUED | OUTPATIENT
Start: 2018-08-22 | End: 2018-08-22

## 2018-08-22 RX ORDER — LIDOCAINE 40 MG/G
CREAM TOPICAL
Status: DISCONTINUED | OUTPATIENT
Start: 2018-08-22 | End: 2018-08-22 | Stop reason: HOSPADM

## 2018-08-22 RX ORDER — EPHEDRINE SULFATE 50 MG/ML
INJECTION, SOLUTION INTRAMUSCULAR; INTRAVENOUS; SUBCUTANEOUS PRN
Status: DISCONTINUED | OUTPATIENT
Start: 2018-08-22 | End: 2018-08-22

## 2018-08-22 RX ORDER — SODIUM CHLORIDE, SODIUM LACTATE, POTASSIUM CHLORIDE, CALCIUM CHLORIDE 600; 310; 30; 20 MG/100ML; MG/100ML; MG/100ML; MG/100ML
INJECTION, SOLUTION INTRAVENOUS CONTINUOUS PRN
Status: DISCONTINUED | OUTPATIENT
Start: 2018-08-22 | End: 2018-08-22

## 2018-08-22 RX ORDER — ALBUTEROL SULFATE 90 UG/1
AEROSOL, METERED RESPIRATORY (INHALATION) PRN
Status: DISCONTINUED | OUTPATIENT
Start: 2018-08-22 | End: 2018-08-22

## 2018-08-22 RX ORDER — SODIUM CHLORIDE, SODIUM LACTATE, POTASSIUM CHLORIDE, CALCIUM CHLORIDE 600; 310; 30; 20 MG/100ML; MG/100ML; MG/100ML; MG/100ML
INJECTION, SOLUTION INTRAVENOUS CONTINUOUS
Status: DISCONTINUED | OUTPATIENT
Start: 2018-08-22 | End: 2018-08-22 | Stop reason: HOSPADM

## 2018-08-22 RX ORDER — ONDANSETRON 2 MG/ML
4 INJECTION INTRAMUSCULAR; INTRAVENOUS EVERY 30 MIN PRN
Status: DISCONTINUED | OUTPATIENT
Start: 2018-08-22 | End: 2018-08-22 | Stop reason: HOSPADM

## 2018-08-22 RX ORDER — INDOMETHACIN 50 MG/1
100 SUPPOSITORY RECTAL
Status: COMPLETED | OUTPATIENT
Start: 2018-08-22 | End: 2018-08-22

## 2018-08-22 RX ORDER — HYDROMORPHONE HYDROCHLORIDE 1 MG/ML
.3-.5 INJECTION, SOLUTION INTRAMUSCULAR; INTRAVENOUS; SUBCUTANEOUS EVERY 5 MIN PRN
Status: DISCONTINUED | OUTPATIENT
Start: 2018-08-22 | End: 2018-08-22 | Stop reason: HOSPADM

## 2018-08-22 RX ORDER — PROPOFOL 10 MG/ML
INJECTION, EMULSION INTRAVENOUS PRN
Status: DISCONTINUED | OUTPATIENT
Start: 2018-08-22 | End: 2018-08-22

## 2018-08-22 RX ORDER — IOPAMIDOL 510 MG/ML
INJECTION, SOLUTION INTRAVASCULAR PRN
Status: DISCONTINUED | OUTPATIENT
Start: 2018-08-22 | End: 2018-08-22 | Stop reason: HOSPADM

## 2018-08-22 RX ORDER — NALOXONE HYDROCHLORIDE 0.4 MG/ML
.1-.4 INJECTION, SOLUTION INTRAMUSCULAR; INTRAVENOUS; SUBCUTANEOUS
Status: ACTIVE | OUTPATIENT
Start: 2018-08-22 | End: 2018-08-23

## 2018-08-22 RX ORDER — LIDOCAINE HYDROCHLORIDE 20 MG/ML
INJECTION, SOLUTION INFILTRATION; PERINEURAL PRN
Status: DISCONTINUED | OUTPATIENT
Start: 2018-08-22 | End: 2018-08-22

## 2018-08-22 RX ORDER — FLUMAZENIL 0.1 MG/ML
0.2 INJECTION, SOLUTION INTRAVENOUS
Status: ACTIVE | OUTPATIENT
Start: 2018-08-22 | End: 2018-08-22

## 2018-08-22 RX ORDER — ONDANSETRON 4 MG/1
4 TABLET, ORALLY DISINTEGRATING ORAL EVERY 30 MIN PRN
Status: DISCONTINUED | OUTPATIENT
Start: 2018-08-22 | End: 2018-08-22 | Stop reason: HOSPADM

## 2018-08-22 RX ORDER — FENTANYL CITRATE 50 UG/ML
25-50 INJECTION, SOLUTION INTRAMUSCULAR; INTRAVENOUS
Status: DISCONTINUED | OUTPATIENT
Start: 2018-08-22 | End: 2018-08-22 | Stop reason: HOSPADM

## 2018-08-22 RX ORDER — FENTANYL CITRATE 50 UG/ML
INJECTION, SOLUTION INTRAMUSCULAR; INTRAVENOUS PRN
Status: DISCONTINUED | OUTPATIENT
Start: 2018-08-22 | End: 2018-08-22

## 2018-08-22 RX ADMIN — PROPOFOL 50 MG: 10 INJECTION, EMULSION INTRAVENOUS at 09:03

## 2018-08-22 RX ADMIN — PIPERACILLIN SODIUM AND TAZOBACTAM SODIUM 3.38 G: 3; .375 INJECTION, POWDER, LYOPHILIZED, FOR SOLUTION INTRAVENOUS at 19:35

## 2018-08-22 RX ADMIN — CYCLOBENZAPRINE HYDROCHLORIDE 10 MG: 10 TABLET, FILM COATED ORAL at 21:33

## 2018-08-22 RX ADMIN — FENTANYL CITRATE 100 MCG: 50 INJECTION, SOLUTION INTRAMUSCULAR; INTRAVENOUS at 07:41

## 2018-08-22 RX ADMIN — PROPOFOL 120 MG: 10 INJECTION, EMULSION INTRAVENOUS at 07:41

## 2018-08-22 RX ADMIN — B-COMPLEX W/ C & FOLIC ACID TAB 1 TABLET: TAB at 15:53

## 2018-08-22 RX ADMIN — FOLIC ACID 1 MG: 1 TABLET ORAL at 15:53

## 2018-08-22 RX ADMIN — ROCURONIUM BROMIDE 10 MG: 10 INJECTION INTRAVENOUS at 07:53

## 2018-08-22 RX ADMIN — MIDAZOLAM 1 MG: 1 INJECTION INTRAMUSCULAR; INTRAVENOUS at 08:07

## 2018-08-22 RX ADMIN — ALBUTEROL SULFATE 6 PUFF: 90 AEROSOL, METERED RESPIRATORY (INHALATION) at 09:10

## 2018-08-22 RX ADMIN — PIPERACILLIN SODIUM AND TAZOBACTAM SODIUM 3.38 G: 3; .375 INJECTION, POWDER, LYOPHILIZED, FOR SOLUTION INTRAVENOUS at 01:31

## 2018-08-22 RX ADMIN — DIPHENHYDRAMINE HYDROCHLORIDE 25 MG: 25 CAPSULE ORAL at 21:33

## 2018-08-22 RX ADMIN — ALBUTEROL SULFATE 6 PUFF: 90 AEROSOL, METERED RESPIRATORY (INHALATION) at 09:16

## 2018-08-22 RX ADMIN — MIDAZOLAM 1 MG: 1 INJECTION INTRAMUSCULAR; INTRAVENOUS at 07:28

## 2018-08-22 RX ADMIN — SUGAMMADEX 200 MG: 100 INJECTION, SOLUTION INTRAVENOUS at 09:05

## 2018-08-22 RX ADMIN — Medication 5 MG: at 08:01

## 2018-08-22 RX ADMIN — INDOMETHACIN 100 MG: 50 SUPPOSITORY RECTAL at 09:06

## 2018-08-22 RX ADMIN — Medication 5 MG: at 08:25

## 2018-08-22 RX ADMIN — Medication 100 MG: at 07:41

## 2018-08-22 RX ADMIN — GLUCAGON HYDROCHLORIDE 0.4 MG: KIT at 08:25

## 2018-08-22 RX ADMIN — PIPERACILLIN SODIUM AND TAZOBACTAM SODIUM 3.38 G: 3; .375 INJECTION, POWDER, LYOPHILIZED, FOR SOLUTION INTRAVENOUS at 06:30

## 2018-08-22 RX ADMIN — SODIUM CHLORIDE, POTASSIUM CHLORIDE, SODIUM LACTATE AND CALCIUM CHLORIDE: 600; 310; 30; 20 INJECTION, SOLUTION INTRAVENOUS at 07:27

## 2018-08-22 RX ADMIN — LORATADINE 10 MG: 10 TABLET ORAL at 19:35

## 2018-08-22 RX ADMIN — ONDANSETRON 4 MG: 2 INJECTION INTRAMUSCULAR; INTRAVENOUS at 08:52

## 2018-08-22 RX ADMIN — IBUPROFEN 600 MG: 600 TABLET, FILM COATED ORAL at 01:37

## 2018-08-22 RX ADMIN — SIMETHICONE 2 ML: 63.3; 3.7 SOLUTION/ DROPS ORAL at 08:20

## 2018-08-22 RX ADMIN — PIPERACILLIN SODIUM AND TAZOBACTAM SODIUM 3.38 G: 3; .375 INJECTION, POWDER, LYOPHILIZED, FOR SOLUTION INTRAVENOUS at 12:32

## 2018-08-22 RX ADMIN — VITAMIN E CAP 400 UNIT 400 UNITS: 400 CAP at 15:53

## 2018-08-22 RX ADMIN — IOPAMIDOL 30 ML: 510 INJECTION, SOLUTION INTRAVASCULAR at 08:50

## 2018-08-22 RX ADMIN — LIDOCAINE HYDROCHLORIDE 100 MG: 20 INJECTION, SOLUTION INFILTRATION; PERINEURAL at 07:41

## 2018-08-22 RX ADMIN — WATER 100 ML: 100 IRRIGANT IRRIGATION at 08:50

## 2018-08-22 RX ADMIN — PROPOFOL 50 MG: 10 INJECTION, EMULSION INTRAVENOUS at 08:08

## 2018-08-22 ASSESSMENT — ACTIVITIES OF DAILY LIVING (ADL)
ADLS_ACUITY_SCORE: 11

## 2018-08-22 ASSESSMENT — PAIN DESCRIPTION - DESCRIPTORS: DESCRIPTORS: ACHING

## 2018-08-22 NOTE — PLAN OF CARE
Problem: Chronic Respiratory Difficulty Comorbidity  Goal: Chronic Respiratory Difficulty  Patient comorbidity will be monitored for signs and symptoms of Respiratory Difficulty (Chronic) condition.  Problems will be absent, minimized or managed by discharge/transition of care.   Outcome: Improving  Returned from OR post ERCP around 1040.Had occasional kyle cardia on one monitor to 54-60,when palpated was 62-64,MD notified re: occasional bradycardia.Pt is uncertaiin as to if this is normal for her.Denies pain,nausea.tolerating regular diet without c/o nausea.Voiding without difficulty.States her bowels have not moved today but she is passing flatus.Refusing capno,information given re: need for capno post procedure.Pt has stated she may use when falling asleep as she does not like the bipap machine the hospital has provided.Lung sounds slightly diminished,all lobes.On Zosyn every 6 hours.

## 2018-08-22 NOTE — PROVIDER NOTIFICATION
Notified MD at 1600 PM regarding changes in vital signs.  HR 54. No other s/s noted. /53. Also K+ today 3.4    Spoke with: Dr. Umaña    Orders were not obtained. CBC/BMP tomorrow AM. MD ok with K+ @ 3.4. Will notify if HR drops lower.     Comments: Will cont to monitor.

## 2018-08-22 NOTE — PLAN OF CARE
Problem: Patient Care Overview  Goal: Plan of Care/Patient Progress Review  Outcome: No Change  Patient reports mild pain, managed by Ibuprofen. Vitals stable, voiding but not saving, no nausea, Bipap at night, NPO after midnight for scheduled ERCP,no significant event noted during the night.

## 2018-08-22 NOTE — DISCHARGE INSTRUCTIONS
[facility]  Same-Day Surgery   Adult Discharge Orders & Instructions     For 24 hours after surgery    1. Get plenty of rest.  A responsible adult must stay with you for at least 24 hours after you leave the hospital.   2. Do not drive or use heavy equipment.  If you have weakness or tingling, don't drive or use heavy equipment until this feeling goes away.  3. Do not drink alcohol.  4. Avoid strenuous or risky activities.  Ask for help when climbing stairs.   5. You may feel lightheaded.  IF so, sit for a few minutes before standing.  Have someone help you get up.   6. If you have nausea (feel sick to your stomach): Drink only clear liquids such as apple juice, ginger ale, broth or 7-Up.  Rest may also help.  Be sure to drink enough fluids.  Move to a regular diet as you feel able.  7. You may have a slight fever. Call the doctor if your fever is over 100 F (37.7 C) (taken under the tongue) or lasts longer than 24 hours.  8. You may have a dry mouth, a sore throat, muscle aches or trouble sleeping.  These should go away after 24 hours.  9. Do not make important or legal decisions.   Call your doctor for any of the followin.  Signs of infection (fever, growing tenderness at the surgery site, a large amount of drainage or bleeding, severe pain, foul-smelling drainage, redness, swelling).    2. It has been over 8 to 10 hours since surgery and you are still not able to urinate (pass water).    3.  Headache for over 24 hours.      To contact a doctor, call Dr. Cooper at [phone] at the Gastroenterology Clinic from 8 am till 5 pm or:    x   [phone] and ask for the resident on call for   Gastroenterology (answered 24 hours a day)  x   Emergency Department:    [facility] [place]: [phone]       (TTY for hearing impaired: [phone])

## 2018-08-22 NOTE — ANESTHESIA CARE TRANSFER NOTE
Patient: Marlin Harman    Procedure(s):  Endoscopic Retrograde Cholangiopancreatogram with spinchterotomy, bile duct and pancreatic ducts stents placement - Wound Class: II-Clean Contaminated    Diagnosis: Bile Duct Stone   Diagnosis Additional Information: No value filed.    Anesthesia Type:   No value filed.     Note:  Airway :Face Mask  Patient transferred to:PACU  Comments: VSS, report to RN Handoff Report: Identifed the Patient, Identified the Reponsible Provider, Reviewed the pertinent medical history, Discussed the surgical course, Reviewed Intra-OP anesthesia mangement and issues during anesthesia, Set expectations for post-procedure period and Allowed opportunity for questions and acknowledgement of understanding      Vitals: (Last set prior to Anesthesia Care Transfer)    CRNA VITALS  8/22/2018 0850 - 8/22/2018 0925      8/22/2018             Pulse: 79    Ht Rate: 80    SpO2: 100 %    Resp Rate (observed): 10                Electronically Signed By: MIR Chaidez CRNA  August 22, 2018  9:25 AM

## 2018-08-22 NOTE — PROGRESS NOTES
Advanced GI RN Care Coordination Note:    Procedure requested: Repeat ERCP w/ Spyglass     Requesting provider: Dr. Cooper     Indication: jaundice and biliary duct dilation.     Urgency: 4 weeks from 08/22/18.     Pre procedure testing needed: Pre op H&P     PAC appointment: if patient is not established with PCP for clearance.      Other notes:   Previous procedure completed while patient was inpatient. Patient was admitted on 8/21/18. Planned discharge is for 08/23/18, received note from Dr. Cooper regarding procedure follow up for post discharge.     Orders placed and routed to scheduling to arrange for procedure.     Ileana Steele RN   Care Coordinator   [phone]

## 2018-08-22 NOTE — PLAN OF CARE
Problem: Patient Care Overview  Goal: Plan of Care/Patient Progress Review  Outcome: No Change  ADMISSION  Direct admit from the ED. Arrived on the floor around 1500H. Oriented to room, unit, call light, plan of care. Hand outs provided per unit protocol. UAL, denies pain, tolerating regular diet. Will go outside couple of times this shift to smoke. Pt requested to have nicotine cartridge post op. Per PCP no need for any pre-op skin scrub when asked by this RN. Prn albuterol nebs given by RT for SOB. Lung sound diminished posterior. !L of IVF given per order, now PIV-SL. Bowel sounds audible. Voiding adequately per pt but not saving. On BiPap, RT to set up the BiPap. OVSS, afebrile. Admission done. Please send valuable to security in am during the procedure and bring it back to pt. PLAN: NPO post midnight, ERCP tomorrow.

## 2018-08-22 NOTE — PROGRESS NOTES
[facility], [place]    Internal Medicine Progress Note - Gold Service      Assessment & Plan   Marlin Harman is a 62 year old female  admitted on 8/21/2018. She has a history of HTN and is admitted for CBD stone and cystic duct stone with concerns for possible cholangitis.     # CBD stone, cystic duct stone, concern for cholangitis, s/p ERCP on 8/22  Per outside MRI, large CBD stone, 1.3cm, and cystic duct stone too.  Outpatient surgeon recommended GI at the  for advanced ERCP.  S/p ERCP on 8/22, PD and CBD stent placed, swept out sludge and pus.  CBD stone is still present.  Will need repeat ERCP in 4 weeks with possible lithotriopsy.  -  GI consult:  Appreciate recommendations  -  Continue Zosyn overnight, if doing well, will transition to PO antibiotics to complete course for cholangitis  -  Trend LFTs, CBC  -  Omeprazole 40mg daily for 6-8 weeks     #  HTN  At home takes hydrochlorothiazide.  Given need for mild IVF and has acceptable BPs, holding hydrochlorothiazide for now.  -  Labetolol PRN  -  Holding hydrochlorothiazide until tomorrow or discharge     # Pain Assessment:  Current Pain Score 8/22/2018   Patient currently in pain? no   Pain location -   Pain descriptors -   Marlin odell pain level was assessed and she currently denies pain.      Diet: Regular Diet Adult  Fluids: None  DVT Prophylaxis: Pneumatic Compression Devices  Code Status: Full Code    Disposition Plan   Expected discharge: Tomorrow, recommended to prior living arrangement once antibiotic plan established and noted to have normalizing LFTs.     Entered: Radha Umaña 08/22/2018, 4:18 PM   Information in the above section will display in the discharge planner report.      The patient's care was discussed with the Patient.    Radha Umaña  Internal Medicine Staff Hospitalist Service  [facility]  Pager: [phone]  Please see sticky note for cross cover information    Interval  History   Nursing notes reviewed, no acute events overnight.  ERCP this morning went well.  Feeling a little groggy.  No pain.  No nausea/vomiting.      Data reviewed today: I reviewed all medications, new labs and imaging results over the last 24 hours. I personally reviewed no images or EKG's today.    Physical Exam   Vital Signs: Temp: 96.6  F (35.9  C) Temp src: Oral BP: 155/53 Pulse: 73 Heart Rate: 54 Resp: 16 SpO2: 95 % O2 Device: None (Room air) Oxygen Delivery: 2 LPM  Weight: 234 lbs 8 oz  General Appearance: Sitting in a chair in NAD  Respiratory: CTAB, breathing comfortably  Cardiovascular: RRR, no m/r/g, peripheral pulses intact  GI: NABS, soft, NT, ND          Data   Medications     - MEDICATION INSTRUCTIONS -         cyclobenzaprine  10 mg Oral At Bedtime     folic acid  1 mg Oral Daily     loratadine  10 mg Oral Daily     [START ON 8/23/2018] omeprazole  40 mg Oral QAM AC     piperacillin-tazobactam  3.375 g Intravenous Q6H     senna-docusate  1 tablet Oral BID    Or     senna-docusate  2 tablet Oral BID     sodium chloride (PF)  3 mL Intracatheter Q8H     sodium chloride (PF)  3 mL Intracatheter Q8H     vitamin B complex with vitamin C  1 tablet Oral Daily     vitamin E  400 Units Oral Daily     Data     Recent Labs  Lab 08/22/18  0712 08/21/18  1147 08/20/18  1206   WBC 8.2 9.7 12.4*   HGB 11.2* 12.4 12.7   MCV 98 96 95    272 262    138 135   POTASSIUM 3.4 3.0* 3.0*   CHLORIDE 107 101 97   CO2 23 29 28   BUN 21 18 16   CR 0.62 0.57 0.53   ANIONGAP 9 8 10   KEILY 8.4* 9.0 9.2   GLC 95 87 88   ALBUMIN 2.3* 2.5* 2.5*   PROTTOTAL 6.5* 7.4 7.5   BILITOTAL 10.7* 11.3* 13.0*   ALKPHOS 280* 316* 347*   * 138* 125*   * 119* 112*   LIPASE 174 171 138

## 2018-08-22 NOTE — BRIEF OP NOTE
[facility], [place]  Gastroenterology Brief Operative Note    Pre-operative diagnosis: Choledocholithiasis   Post-operative diagnosis Same   Procedure: Endoscopic Retrograde Cholangiopancreatogram with spinchterotomy, bile duct and pancreatic ducts stents placement   Surgeon: Dr Cooper   Assistants(s): Azul Self MD   Anesthesia: General endotracheal anesthesia   Estimated blood loss: Minimal    Total IV fluids: (See anesthesia record)   Blood transfusion: No transfusion was given during surgery   Total urine output: (See anesthesia record)   Drains: None   Specimens: None   Implants: 4 Fr x 11 cm SPT Gomez PD stent   10 x 60 mm Viabil CBD stent   Solus  10 Fr x 7 cm double pig tail biliary stent    Findings: - Erosive gastritis seen.   - Two wire cannulation with prophylactic PD stent placement.   - Cholangiogram with distal CBD stricture, upstream dilation of intra extra hepatic ducts, and a CBD stone. Cystic duct and GB were not opacified.  - Balloon sweeps with extraction of sludge and some pus.   - Biliary sphincterotomy with placement of a 10 x 60 mm Viabil CBD stent without side ports. Stent placed below the cystic take off.   - A Solus  10 Fr x 7 cm double pig tail biliary stent placed across the FCSEM Viabil stent.    Complications: None   Condition: Stable   Comments:      Recommendations:         See dictated procedure report for full details (found in chart review under 'Procedures')    - Transfer to the floor for ongoing care.   - Complete course of antibiotics to treat cholangitis.  - Start Omperazole 40mg q daily x 6-8 weeks.   - Avoid NSAIDs and anticoagulation for 72 hrs.  -Repeat CBC and CMP in the am   - Repeat ERCP with cholangioscopy and possible EHL in 4 weeks with Dr Cooper.   - Findings were discussed with the patient after the procedure.   - Panc-bili team will continue to follow along.      Azul Self MD  Advanced Endoscopy Fellow  Pager:  [phone]

## 2018-08-22 NOTE — ANESTHESIA PREPROCEDURE EVALUATION
Anesthesia Evaluation     .             ROS/MED HX    ENT/Pulmonary:     (+)sleep apnea, , . .    Neurologic:       Cardiovascular:     (+) hypertension----. : . . . :. .       METS/Exercise Tolerance:     Hematologic:         Musculoskeletal:         GI/Hepatic:         Renal/Genitourinary:         Endo:         Psychiatric:         Infectious Disease:         Malignancy:         Other:                     Physical Exam      Airway   Mallampati: II  TM distance: >3 FB  Neck ROM: full    Dental   Comment: Poor dentition    Cardiovascular       Pulmonary                     Anesthesia Plan      History & Physical Review      ASA Status:  3 .    NPO Status:  > 8 hours    Plan for General and ETT          Postoperative Care      Consents          Procedure:  Procedure(s):  Endoscopic Retrograde Cholangiopancreatogram - Wound Class: II-Clean Contaminated    Patient Active Problem List   Diagnosis     Chronic seasonal allergic rhinitis due to pollen     Morbid obesity with BMI of 40.0-44.9, adult (H)     Benign essential hypertension     Tobacco use     Pap smear of cervix declined     Colonoscopy refused     Mammogram declined     Abnormal results of liver function studies     Scleral icterus     Obstruction of bile duct       No past medical history on file.    History reviewed. No pertinent surgical history.      No current facility-administered medications on file prior to encounter.   Current Outpatient Prescriptions on File Prior to Encounter:  loratadine (CLARITIN) 10 MG tablet Take 10 mg by mouth daily       Allergies   Allergen Reactions     Citrus Dermatitis     Latex      Mold      Seafood Nausea and Vomiting     Scallops only, all other seafood ok     Small Pox Vaccine      Sulfa Drugs        Recent Labs   Lab Test  08/22/18   0712   HGB  11.2*     Recent Labs   Lab Test  08/22/18   0712   POTASSIUM  3.4     Recent Labs   Lab Test  08/22/18   0712   PLT  253     Recent Labs   Lab Test  08/07/18   0910   INR   1.00       No results found for this or any previous visit (from the past 4320 hour(s)).      Attending Anesthesiologist    Ryan Garsia MD    *[phone]                .

## 2018-08-22 NOTE — ANESTHESIA POSTPROCEDURE EVALUATION
Patient: Marlin Harman    Procedure(s):  Endoscopic Retrograde Cholangiopancreatogram with spinchterotomy, bile duct and pancreatic ducts stents placement - Wound Class: II-Clean Contaminated    Diagnosis:Bile Duct Stone   Diagnosis Additional Information: No value filed.    Anesthesia Type:  No value filed.    Note:  Anesthesia Post Evaluation    Patient location during evaluation: PACU  Patient participation: Able to fully participate in evaluation  Level of consciousness: awake and alert  Pain management: adequate  Airway patency: patent  Cardiovascular status: acceptable  Respiratory status: acceptable  Hydration status: acceptable  PONV: none     Anesthetic complications: None          Last vitals:  Vitals:    08/22/18 0922 08/22/18 0930 08/22/18 0945   BP: 151/74 146/75 147/72   Pulse:      Resp: 20 20    Temp: 36.4  C (97.6  F)     SpO2: 100% 100% 98%         Electronically Signed By: Ryan Garsia MD  August 22, 2018  9:50 AM

## 2018-08-22 NOTE — PROGRESS NOTES
Patient was IP at the time of scheduling.   Repeat ERCP scheduled on 09/13/2018.  Procedure packet will be mailed to patient and I will call her on 08/28/2018 or 08/29/2018 to go over scheduling details.     SR 08/22/2018 @ 1107 A

## 2018-08-23 ENCOUNTER — PATIENT OUTREACH (OUTPATIENT)
Dept: CARE COORDINATION | Facility: CLINIC | Age: 63
End: 2018-08-23

## 2018-08-23 VITALS
SYSTOLIC BLOOD PRESSURE: 121 MMHG | HEIGHT: 63 IN | RESPIRATION RATE: 18 BRPM | BODY MASS INDEX: 41.55 KG/M2 | WEIGHT: 234.5 LBS | TEMPERATURE: 96.9 F | DIASTOLIC BLOOD PRESSURE: 63 MMHG | HEART RATE: 75 BPM | OXYGEN SATURATION: 94 %

## 2018-08-23 LAB
ALBUMIN SERPL-MCNC: 2.3 G/DL (ref 3.4–5)
ALP SERPL-CCNC: 257 U/L (ref 40–150)
ALT SERPL W P-5'-P-CCNC: 138 U/L (ref 0–50)
ANION GAP SERPL CALCULATED.3IONS-SCNC: 4 MMOL/L (ref 3–14)
AST SERPL W P-5'-P-CCNC: 104 U/L (ref 0–45)
BILIRUB SERPL-MCNC: 6.2 MG/DL (ref 0.2–1.3)
BUN SERPL-MCNC: 15 MG/DL (ref 7–30)
CALCIUM SERPL-MCNC: 8.3 MG/DL (ref 8.5–10.1)
CHLORIDE SERPL-SCNC: 107 MMOL/L (ref 94–109)
CO2 SERPL-SCNC: 27 MMOL/L (ref 20–32)
CREAT SERPL-MCNC: 0.62 MG/DL (ref 0.52–1.04)
ERYTHROCYTE [DISTWIDTH] IN BLOOD BY AUTOMATED COUNT: 18.8 % (ref 10–15)
GFR SERPL CREATININE-BSD FRML MDRD: >90 ML/MIN/1.7M2
GLUCOSE SERPL-MCNC: 104 MG/DL (ref 70–99)
HCT VFR BLD AUTO: 36 % (ref 35–47)
HGB BLD-MCNC: 11.3 G/DL (ref 11.7–15.7)
MCH RBC QN AUTO: 30.5 PG (ref 26.5–33)
MCHC RBC AUTO-ENTMCNC: 31.4 G/DL (ref 31.5–36.5)
MCV RBC AUTO: 97 FL (ref 78–100)
PLATELET # BLD AUTO: 250 10E9/L (ref 150–450)
POTASSIUM SERPL-SCNC: 3.6 MMOL/L (ref 3.4–5.3)
PROT SERPL-MCNC: 6.7 G/DL (ref 6.8–8.8)
RBC # BLD AUTO: 3.7 10E12/L (ref 3.8–5.2)
SODIUM SERPL-SCNC: 138 MMOL/L (ref 133–144)
WBC # BLD AUTO: 8.1 10E9/L (ref 4–11)

## 2018-08-23 PROCEDURE — 25000128 H RX IP 250 OP 636: Performed by: INTERNAL MEDICINE

## 2018-08-23 PROCEDURE — 85027 COMPLETE CBC AUTOMATED: CPT | Performed by: INTERNAL MEDICINE

## 2018-08-23 PROCEDURE — 25000132 ZZH RX MED GY IP 250 OP 250 PS 637: Performed by: INTERNAL MEDICINE

## 2018-08-23 PROCEDURE — 94660 CPAP INITIATION&MGMT: CPT

## 2018-08-23 PROCEDURE — 80053 COMPREHEN METABOLIC PANEL: CPT | Performed by: INTERNAL MEDICINE

## 2018-08-23 PROCEDURE — 36415 COLL VENOUS BLD VENIPUNCTURE: CPT | Performed by: INTERNAL MEDICINE

## 2018-08-23 PROCEDURE — 40000275 ZZH STATISTIC RCP TIME EA 10 MIN

## 2018-08-23 PROCEDURE — 40000809 ZZH STATISTIC NO DOCUMENTATION TO SUPPORT CHARGE

## 2018-08-23 PROCEDURE — 99239 HOSP IP/OBS DSCHRG MGMT >30: CPT | Performed by: INTERNAL MEDICINE

## 2018-08-23 RX ORDER — CIPROFLOXACIN 500 MG/1
500 TABLET, FILM COATED ORAL 2 TIMES DAILY
Qty: 10 TABLET | Refills: 0 | Status: SHIPPED | OUTPATIENT
Start: 2018-08-23 | End: 2018-08-28

## 2018-08-23 RX ORDER — CIPROFLOXACIN 500 MG/1
500 TABLET, FILM COATED ORAL 2 TIMES DAILY
Qty: 6 TABLET | Refills: 0 | Status: SHIPPED | OUTPATIENT
Start: 2018-08-23 | End: 2018-08-23

## 2018-08-23 RX ORDER — CYCLOBENZAPRINE HCL 10 MG
10 TABLET ORAL AT BEDTIME
Qty: 4 TABLET | Refills: 0 | Status: SHIPPED | OUTPATIENT
Start: 2018-08-23 | End: 2018-10-22

## 2018-08-23 RX ADMIN — LORATADINE 10 MG: 10 TABLET ORAL at 08:06

## 2018-08-23 RX ADMIN — FOLIC ACID 1 MG: 1 TABLET ORAL at 08:07

## 2018-08-23 RX ADMIN — OXYCODONE HYDROCHLORIDE 5 MG: 5 TABLET ORAL at 00:13

## 2018-08-23 RX ADMIN — IBUPROFEN 600 MG: 600 TABLET, FILM COATED ORAL at 08:06

## 2018-08-23 RX ADMIN — B-COMPLEX W/ C & FOLIC ACID TAB 1 TABLET: TAB at 08:08

## 2018-08-23 RX ADMIN — OMEPRAZOLE 40 MG: 20 CAPSULE, DELAYED RELEASE ORAL at 08:06

## 2018-08-23 RX ADMIN — PIPERACILLIN SODIUM AND TAZOBACTAM SODIUM 3.38 G: 3; .375 INJECTION, POWDER, LYOPHILIZED, FOR SOLUTION INTRAVENOUS at 07:10

## 2018-08-23 RX ADMIN — PIPERACILLIN SODIUM AND TAZOBACTAM SODIUM 3.38 G: 3; .375 INJECTION, POWDER, LYOPHILIZED, FOR SOLUTION INTRAVENOUS at 12:49

## 2018-08-23 RX ADMIN — VITAMIN E CAP 400 UNIT 400 UNITS: 400 CAP at 08:07

## 2018-08-23 RX ADMIN — PIPERACILLIN SODIUM AND TAZOBACTAM SODIUM 3.38 G: 3; .375 INJECTION, POWDER, LYOPHILIZED, FOR SOLUTION INTRAVENOUS at 01:06

## 2018-08-23 ASSESSMENT — ACTIVITIES OF DAILY LIVING (ADL)
ADLS_ACUITY_SCORE: 11

## 2018-08-23 ASSESSMENT — PAIN DESCRIPTION - DESCRIPTORS
DESCRIPTORS: ACHING
DESCRIPTORS: ACHING

## 2018-08-23 NOTE — PLAN OF CARE
Problem: Patient Care Overview  Goal: Individualization & Mutuality  7C--home. Pt left ambulatory ~ 2:30 pm to meet her  in the lobby. She is aware to stop at the 3rd floor pharm. I have given her written and verbal discharge instructions and answered her questions.

## 2018-08-23 NOTE — PLAN OF CARE
Problem: Patient Care Overview  Goal: Plan of Care/Patient Progress Review  Outcome: Improving  Pt A/O x4, VSS. Denies abd pain. Back pain managed with Flexiril. ERCP on days. Refused capno. Sclera yellow. HR 54 @ 1500. MD notified, no other s/s. K+ 3.4, MDs ok w/ no replacement, labs due in AM. Reg diet, denies n/v. BM, passing gas. Voiding adequately, pt forgot to pee in hat. Stated 4 voids since ERCP. PIV SL between abx. Bipap on during sleep. Pt likely to DC tomorrow. Cont POC.

## 2018-08-23 NOTE — PROGRESS NOTES
GASTROENTEROLOGY PROGRESS NOTE          ASSESSMENT AND RECOMMENDATIONS:   Assessment:  62 year old female with a history of tobacco abuse, obesity, HTN, who GI was consulted on for choledocholithiasis. Presented with painless obstructive jaundice on 8/2 to PCP office with subsequent workup revealing two 1.3 cm stones (one in CBD, one in gallbladder vs cystic duct) causing obstruction and elevated LFTs.     S/p ERCP on 8/22 with findings of mild erosive gastritis in the body of the stomach, 4 cm distal CBD stricture with severe upstream dilation of the intra/extra hepatic ducts and a filling defect above the stricture consistent with a stone, balloon occlusion cholangiogram not filling the cystic duct/gallbladder indicating likely cystic duct obstruction from the stone, and distal CBD balloon sweep with extraction of sludge and some pus. S/p biliary sphincterotomy, fully covered 10 x 60 mm Viabil CBD stent without side ports below the likely cystic take off, 10 Fr x 7 cm double pigtail biliary Solus stent placed across the FCSEM Viabil stent with the proximal pigtail above the CBD stone, and prophylactic 4 Fr x 11 cm Badillo PD stent placement. Given narrow distal CBD, we were unlikely to easily remove large biliary stones during this procedure, so ideally stent will allow slow dilation of the distal biliary stricture to facilitate stone removal/Spyglass cholangioscope passage at the next ERCP.     Doing well post-procedure, no N/V or abdominal pain. Bilirubin improving this AM.      Recommendations  - Complete course of antibiotics PO for cholangitis  - Repeat ERCP with Dr. Cooper in 4 weeks, we will arrange. Will plan for Spyglass evaluation of biliary stricture at that time   - Outpatient surgery evaluation for timing of cholecystectomy  - Omeprazole 40mg q daily x 6-8 weeks  - Avoid NSAIDs and anticoagulation for 72 hrs    GI will sign off    Thank you for involving us in this patient's care. Please do not  hesitate to contact the GI service with any questions or concerns.     Pt care plan discussed with Dr. Gomez, GI staff physician.    Chente Finley MD  GI Fellow  P: [phone]          Interval History:     Feeling well overnight, no abdominal pain, N/V. No fevers or chills.     4 point ROS performed and negative unless noted above          Medications:     Current Facility-Administered Medications   Medication     albuterol neb solution 2.5 mg     cyclobenzaprine (FLEXERIL) tablet 10 mg     diphenhydrAMINE (BENADRYL) capsule 25 mg     folic acid (FOLVITE) tablet 1 mg     ibuprofen (ADVIL/MOTRIN) tablet 600 mg     labetalol (NORMODYNE/TRANDATE) injection 10 mg     lidocaine (LMX4) cream     lidocaine (LMX4) cream     lidocaine 1 % 1 mL     lidocaine 1 % 1 mL     loratadine (CLARITIN) tablet 10 mg     May continue current IV fluid if patient has IV fluids infusing until discharge.     melatonin tablet 1 mg     naloxone (NARCAN) injection 0.1-0.4 mg     nicotine polacrilex (NICORETTE) gum 2 mg     omeprazole (priLOSEC) CR capsule 40 mg     ondansetron (ZOFRAN-ODT) ODT tab 4 mg    Or     ondansetron (ZOFRAN) injection 4 mg     oxyCODONE IR (ROXICODONE) tablet 5-10 mg     piperacillin-tazobactam (ZOSYN) 3.375 g vial to attach to  mL bag     prochlorperazine (COMPAZINE) injection 10 mg    Or     prochlorperazine (COMPAZINE) tablet 10 mg    Or     prochlorperazine (COMPAZINE) Suppository 25 mg     senna-docusate (SENOKOT-S;PERICOLACE) 8.6-50 MG per tablet 1 tablet    Or     senna-docusate (SENOKOT-S;PERICOLACE) 8.6-50 MG per tablet 2 tablet     sodium chloride (PF) 0.9% PF flush 3 mL     sodium chloride (PF) 0.9% PF flush 3 mL     sodium chloride (PF) 0.9% PF flush 3 mL     sodium chloride (PF) 0.9% PF flush 3 mL     sodium chloride (PF) 0.9% PF flush 3 mL     vitamin B complex with vitamin C (STRESS TAB) tablet 1 tablet     vitamin E capsule 400 Units        Physical Exam   Blood pressure 121/63, pulse 75,  "temperature 96.9  F (36.1  C), temperature source Oral, resp. rate 18, height 1.6 m (5' 2.99\"), weight 106.4 kg (234 lb 8 oz), SpO2 94 %.  Constitutional: not dyspneic/diaphoretic, no acute distress  Eyes: Sclera icteric  Ears/nose/mouth/throat: Normal oropharynx without ulcers or exudate, mucus membranes moist, hearing intact  Neck: supple, thyroid normal size  CV: No edema  Respiratory: Unlabored breathing  Abd: Nondistended, +bs, no hepatosplenomegaly, nontender, no peritoneal signs  Skin: warm, perfused  Neuro: AAO x 3  Psych: Normal affect  MSK: No gross deformities    Data   Current Labs  CBC  Recent Labs  Lab 08/23/18 0722 08/22/18  0712 08/21/18  1147 08/20/18  1206   WBC 8.1 8.2 9.7 12.4*   RBC 3.70* 3.59* 3.94 4.09   HGB 11.3* 11.2* 12.4 12.7   HCT 36.0 35.0 37.9 39.0   MCV 97 98 96 95   MCH 30.5 31.2 31.5 31.1   MCHC 31.4* 32.0 32.7 32.6   RDW 18.8* 18.8* 18.6* 18.8*    253 272 262     BMP  Recent Labs  Lab 08/23/18 0722 08/22/18 0712 08/21/18  1147 08/20/18  1206    139 138 135   POTASSIUM 3.6 3.4 3.0* 3.0*   CHLORIDE 107 107 101 97   CO2 27 23 29 28   ANIONGAP 4 9 8 10   * 95 87 88   BUN 15 21 18 16   CR 0.62 0.62 0.57 0.53   GFRESTIMATED >90 >90 >90 >90   GFRESTBLACK >90 >90 >90 >90   KEILY 8.3* 8.4* 9.0 9.2      INR  Recent Labs  Lab 08/22/18  1838   INR 1.04     Liver panel  Recent Labs  Lab 08/23/18  0722 08/22/18  0712 08/21/18  1147 08/20/18  1206   PROTTOTAL 6.7* 6.5* 7.4 7.5   ALBUMIN 2.3* 2.3* 2.5* 2.5*   BILITOTAL 6.2* 10.7* 11.3* 13.0*   ALKPHOS 257* 280* 316* 347*   * 115* 119* 112*   * 136* 138* 125*       Imaging/procedures:  ERCP:     Impression:          - Mild erosive gastritis seen in the body of the stomach.                        - Pancreatic duct cannulation with prophylactic 4 Fr x                        11 cm Badillo stent placement.                        - Cholangiogram showed a 4 cm distal CBD stricture with                        severe " upstream dilation of the intra/extra hepatic                        ducts and a filling defect above the stricture                        consistent with a stone. Balloon occlusion cholangiogram                        did not fill the cystic duct/gallbladder indicating                        likely cystic duct obstruction from the stone.                        - Successful biliary sphincterotomy. Distal CBD balloon                        sweep with extraction of sludge and some pus.                        - Given narrow distal CBD, unlikely to easily remove                        large biliary stones during this procedure even with                        large balloon dilation. A fully covered 10 x 60 mm                        Viabil CBD stent without side ports was placed below the                        likely cystic take off as seen on MRCP across the                        biliary stricture. This will allow slow dilation of the                        distal biliary stricture to facilitate stone                        removal/Spyglass cholangioscope passage at the next ERCP.                        - A 10 Fr x 7 cm double pigtail biliary Solus stent                        placed across the FCSEM Viabil stent with the proximal                        pigtail above the CBD stone.   Recommendation:      - Return patient to [facility] for ongoing care.                        - Complete course of antibiotics to treat cholangitis.                        - Start Omeprazole 40mg q daily x 6-8 weeks.                        - Avoid NSAIDs and anticoagulation for 72 hrs.                        - Repeat CBC and CMP in the am                        - Repeat ERCP with cholangioscopy and possible EHL of                        both CBD and gallbladder stone in 4 weeks with Dr Cooper.                        Will also assess distal biliary stricture with Spyglass                        at that time aswell.                        -  Findings were discussed with the patient after the                        procedure.                        - Panc-bili team will continue to follow along.

## 2018-08-24 NOTE — PROGRESS NOTES
[facility]: Post-Discharge Note  SITUATION                                                      Admission:    Admission Date: 08/21/18   Reason for Admission: Choledocholithiasis with elevated LFTs  Discharge:    Discharge Date: 08/23/18   Discharge Diagnosis: Choledocholithiasis with elevated LFTs   Discharge Service: Internal Medicine    Discharge Plan: Follow-up with GI in 4 weeks for repeat ERCP with spyglass     BACKGROUND                                                      Hospital Course     Marlin Harman was admitted on 8/21/2018 for choledocholithiasis with fevers concerning for cholangitis.  The following problems were addressed during her hospitalization:     #Choledocholithiasis with elevated LFTs  Ms. Harman was found to have a large common bile duct stone measuring 1.3 cm and a cystic duct stone on a MRI from an outside hospital.  Outpatient providers referred her to see [facility] GI.  When she was calling to schedule her ERCP with GI, she described having fevers.  Therefore, she was advised to present early for evaluation.  Given concern for cholangitis in the setting of a large CBD stone, she was initiated on IV antibiotics and IV fluids.  ERCP on 8/22 revealed common bile duct stricture, common bile duct stone.  Pus and sludge were swept from the common bile duct.  Sphincterotomy was done and stents were placed.  She tolerated procedure well.  She remained afebrile after the procedure.  Upon discharge she was transitioned to oral antibiotics to complete a 7 day course.  She will follow up with GI in 4 weeks for a repeat ERCP with spyglass and anticipated lithotripsy of the stone.  It is likely she will also need to have her gallbladder removed and this may possibly occur at the same time.  -Ciprofloxacin to complete 7 day course  -Omeprazole 40 mg daily for 6-8 weeks     #Hypertension  Hydrochlorothiazide was held while inpatient given concern for cholangitis.  Blood pressures  remained acceptable during hospitalization.  Hydrochlorthiazide resumed upon discharge.     #Tobacco abuse  Currently smokes 4-6 cigarettes a day.  Offered nicotine replacement while inpatient and discussed the importance of smoking cessation given the high likelihood she will need surgery in the near future.    ASSESSMENT      Patient reports symptoms are: Improved  Does the patient have all of their medications?: Yes  Does patient know what their new medications are for?: Yes  Does paient have a follow-up appointment scheduled?: No  Does patient have any other questions or concerns?: No         PLAN                                                      No future appointments.        Selina Daugherty, CMA

## 2018-08-24 NOTE — DISCHARGE SUMMARY
[facility], [place]    Internal Medicine Discharge Summary- Gold Service    Date of Admission:  8/21/2018  Date of Discharge:  8/23/2018  3:19 PM  Discharging Provider: Radha Umaña  Discharge Team: Gold 5    Discharge Diagnoses   Choledocholithiasis with elevated LFTs  Hypertension  Tobacco use      Follow-ups Needed After Discharge   Follow-up with GI in 4 weeks for repeat ERCP with spyglass    Hospital Course   Marlin Harman was admitted on 8/21/2018 for choledocholithiasis with fevers concerning for cholangitis.  The following problems were addressed during her hospitalization:    #Choledocholithiasis with elevated LFTs  Ms. Harman was found to have a large common bile duct stone measuring 1.3 cm and a cystic duct stone on a MRI from an outside hospital.  Outpatient providers referred her to see [facility] GI.  When she was calling to schedule her ERCP with GI, she described having fevers.  Therefore, she was advised to present early for evaluation.  Given concern for cholangitis in the setting of a large CBD stone, she was initiated on IV antibiotics and IV fluids.  ERCP on 8/22 revealed common bile duct stricture, common bile duct stone.  Pus and sludge were swept from the common bile duct.  Sphincterotomy was done and stents were placed.  She tolerated procedure well.  She remained afebrile after the procedure.  Upon discharge she was transitioned to oral antibiotics to complete a 7 day course.  She will follow up with GI in 4 weeks for a repeat ERCP with spyglass and anticipated lithotripsy of the stone.  It is likely she will also need to have her gallbladder removed and this may possibly occur at the same time.  -Ciprofloxacin to complete 7 day course  -Omeprazole 40 mg daily for 6-8 weeks    #Hypertension  Hydrochlorothiazide was held while inpatient given concern for cholangitis.  Blood pressures remained acceptable during hospitalization.  Hydrochlorthiazide resumed  upon discharge.    #Tobacco abuse  Currently smokes 4-6 cigarettes a day.  Offered nicotine replacement while inpatient and discussed the importance of smoking cessation given the high likelihood she will need surgery in the near future.    # Discharge Pain Plan:   - Patient currently has NO PAIN and is not being prescribed pain medications on discharge.    Consultations This Hospital Stay   MEDICATION HISTORY IP PHARMACY CONSULT  MEDICATION HISTORY IP PHARMACY CONSULT  GI PANCREATICOBILIARY ADULT IP CONSULT  GI PANCREATICOBILIARY ADULT IP CONSULT     Code Status   Full Code    Time Spent on this Encounter   I, Radha Umaña, personally saw the patient today and spent greater than 30 minutes discharging this patient.       Radha Umaña  Internal Medicine Staff Hospitalist Service  [facility]  Pager: [phone]  ______________________________________________________________________    Physical Exam   Vital Signs: Temp: 96.9  F (36.1  C) Temp src: Oral BP: 121/63   Heart Rate: 70 Resp: 18 SpO2: 94 % O2 Device: None (Room air)    Weight: 234 lbs 8 oz    General Appearance: Singing in a chair in NAD  Respiratory: CTAB, breathing comfortably on room air  Cardiovascular: RRR, no murmurs, rubs, gallops, peripheral pulses intact  GI: NABS, soft, NT, ND      Significant Results and Procedures   Most Recent 3 CBC's:  Recent Labs   Lab Test  08/23/18 0722 08/22/18   0712 08/21/18   1147   WBC  8.1  8.2  9.7   HGB  11.3*  11.2*  12.4   MCV  97  98  96   PLT  250  253  272     Most Recent 3 BMP's:  Recent Labs   Lab Test  08/23/18   0722 08/22/18   0712  08/21/18   1147   NA  138  139  138   POTASSIUM  3.6  3.4  3.0*   CHLORIDE  107  107  101   CO2  27  23  29   BUN  15  21  18   CR  0.62  0.62  0.57   ANIONGAP  4  9  8   KEILY  8.3*  8.4*  9.0   GLC  104*  95  87     Most Recent 2 LFT's:  Recent Labs   Lab Test  08/23/18 0722 08/22/18   0712   AST  104*  115*   ALT  138*  136*   ALKPHOS   257*  280*   BILITOTAL  6.2*  10.7*     Most Recent 3 INR's:  Recent Labs   Lab Test  08/22/18   1838  08/07/18   0910   INR  1.04  1.00   ,   Results for orders placed or performed during the hospital encounter of 08/21/18   XR Chest 2 Views    Narrative    Exam: XR CHEST 2 VW, 8/21/2018 2:04 PM    Indication: cough, fevers;     Comparison: None    Findings:   PA and lateral views of the chest. The cardiomediastinal silhouette is  normal in size. The pulmonary vasculature is within normal limits. No  pneumothorax or pleural effusion. No focal airspace opacities. No  acute bony abnormalities. The visualized upper abdomen appears  unremarkable.      Impression    Impression: No acute airspace disease.    I have personally reviewed the examination and initial interpretation  and I agree with the findings.    BG CHACON MD   POC US ECHO LIMITED    Impression    Error. Order placed on wrong patient.   XR Surgery ALANIS Fluoro G/T 5 Min w Stills    Narrative    This exam was marked as non-reportable because it will not be read by a   radiologist or a [place] non-radiologist provider.                   Pending Results   These results will be followed up by   Unresulted Labs Ordered in the Past 30 Days of this Admission     Date and Time Order Name Status Description    8/21/2018 1131 Blood culture Preliminary     8/21/2018 1131 Blood culture Preliminary              Primary Care Physician   Mouna Concepcion    Discharge Disposition   Discharged to home  Condition at discharge: Stable    Discharge Orders     Reason for your hospital stay   You were hospitalized for concerns of cholangitis associated with a large common bile duct stone and cystic duct stone.  You were started on IV fluids and antibiotics.  GI did an ERCP to place stents to allow appropriate flow of bile.  The cystic duct stone had already passed by the time of the ERCP, however the common bile duct stone was too large to remove.  GI would like to do  a repeat ERCP in 4 weeks to break up and fully remove that stone.     Adult [facility] Follow-up and recommended labs and tests   Follow up with [facility] GI in 4 weeks for repeat ERCP.    Appointments on [place] and/or [facility] (with [facility] or [facility] provider or service). Call [phone] if you haven't heard regarding these appointments within 7 days of discharge.     Activity   Your activity upon discharge: activity as tolerated     When to contact your care team   Call your primary doctor if you have any of the following: temperature greater than 100.4, increased pain or increasing jaundice.     Full Code     Diet   Follow this diet upon discharge: Orders Placed This Encounter     Regular Diet Adult       Discharge Medications   Discharge Medication List as of 8/23/2018  2:19 PM      START taking these medications    Details   cyclobenzaprine (FLEXERIL) 10 MG tablet Take 1 tablet (10 mg) by mouth At Bedtime, Disp-4 tablet, R-0, E-Prescribe         CONTINUE these medications which have CHANGED    Details   ciprofloxacin (CIPRO) 500 MG tablet Take 1 tablet (500 mg) by mouth 2 times daily for 5 days, Disp-10 tablet, R-0, E-Prescribe         CONTINUE these medications which have NOT CHANGED    Details   aspirin 81 MG chewable tablet Take 81 mg by mouth daily, Historical      Cranberry 250 MG CAPS Historical      diphenhydrAMINE (BENADRYL) 25 MG tablet Take 25 mg by mouth nightly as needed for itching or allergies, Historical      folic acid (FOLVITE) 1 MG tablet Take 1 mg by mouth daily, Historical      Glucosamine Sulfate 500 MG TABS Take 1 tablet by mouth daily, Historical      hydrochlorothiazide (HYDRODIURIL) 25 MG tablet Take 25 mg by mouth daily, Historical      loratadine (CLARITIN) 10 MG tablet Take 10 mg by mouth daily, Historical      vitamin B complex with vitamin C (VITAMIN  B COMPLEX) TABS tablet Take 1 tablet by mouth daily, Historical      vitamin E 400 UNIT capsule Take 400 Units by mouth daily,  Historical           Allergies   Allergies   Allergen Reactions     Citrus Dermatitis     Latex      Mold      Seafood Nausea and Vomiting     Scallops only, all other seafood ok     Small Pox Vaccine      Sulfa Drugs

## 2018-08-27 LAB
BACTERIA SPEC CULT: NO GROWTH
BACTERIA SPEC CULT: NO GROWTH
Lab: NORMAL
Lab: NORMAL
SPECIMEN SOURCE: NORMAL
SPECIMEN SOURCE: NORMAL

## 2018-08-28 ENCOUNTER — TELEPHONE (OUTPATIENT)
Dept: GASTROENTEROLOGY | Facility: CLINIC | Age: 63
End: 2018-08-28

## 2018-08-28 NOTE — TELEPHONE ENCOUNTER
Marlin is informed that she is scheduled on 09/13/2018 at 1015 AM with an arrival time of 815 AM for an ERCP procedure.  Patient had H&P done on 08/21/2018.  Patient knows to arrange for a  and someone to monitor her for at least 24 hours post procedure.  All instructions along with the pre-op physical form will be mailed to patient at her address listed in EPIC, it was confirmed during this call to be current.    SR 08/28/2018 @ 523 P

## 2018-09-12 ENCOUNTER — ANESTHESIA EVENT (OUTPATIENT)
Dept: SURGERY | Facility: CLINIC | Age: 63
End: 2018-09-12
Payer: COMMERCIAL

## 2018-09-12 NOTE — ANESTHESIA PREPROCEDURE EVALUATION
CC -- common bile duct stone  HPI=   Choledocholithiasis with elevated LFTs  Ms. Harman was found to have a large common bile duct stone measuring 1.3 cm and a cystic duct stone on a MRI from an outside hospital.  Outpatient providers referred her to see [facility].  When she was calling to schedule her ERCP with GI, she described having fevers.   Anesthesia Evaluation     . Pt has had prior anesthetic. Type: General    No history of anesthetic complications          ROS/MED HX    ENT/Pulmonary:  - neg pulmonary ROS     Neurologic:  - neg neurologic ROS     Cardiovascular:     (+) hypertension----. : . . . :. .       METS/Exercise Tolerance:     Hematologic:  - neg hematologic  ROS       Musculoskeletal:  - neg musculoskeletal ROS       GI/Hepatic:  - neg GI/hepatic ROS       Renal/Genitourinary:  - ROS Renal section negative       Endo:     (+) Obesity, .      Psychiatric:  - neg psychiatric ROS       Infectious Disease:  - neg infectious disease ROS       Malignancy:      - no malignancy   Other:    (+) No chance of pregnancy C-spine cleared: N/A, no H/O Chronic Pain,no other significant disability   - neg other ROS               Labs: below  UPT: n/a    BMP:  Recent Labs   Lab Test  08/23/18   0722   NA  138   POTASSIUM  3.6   CHLORIDE  107   CO2  27   BUN  15   CR  0.62   GLC  104*   KEILY  8.3*     LFTs:   Recent Labs   Lab Test  08/23/18 0722   PROTTOTAL  6.7*   ALBUMIN  2.3*   BILITOTAL  6.2*   ALKPHOS  257*   AST  104*   ALT  138*     CBC:   Recent Labs   Lab Test  08/23/18 0722   WBC  8.1   RBC  3.70*   HGB  11.3*   HCT  36.0   MCV  97   MCH  30.5   MCHC  31.4*   RDW  18.8*   PLT  250     Coags:  Recent Labs   Lab Test  08/22/18   1838  08/07/18   0910   INR  1.04  1.00   PTT   --   30   Labs:  UPT:     BMP:  Recent Labs   Lab Test  08/23/18 0722   NA  138   POTASSIUM  3.6   CHLORIDE  107   CO2  27   BUN  15   CR  0.62   GLC  104*   KEILY  8.3*     LFTs:   Recent Labs   Lab Test  08/23/18 0722   PROTTOTAL   6.7*   ALBUMIN  2.3*   BILITOTAL  6.2*   ALKPHOS  257*   AST  104*   ALT  138*     CBC:   Recent Labs   Lab Test  09/13/18   0833   WBC  8.7   RBC  4.27   HGB  13.6   HCT  41.4   MCV  97   MCH  31.9   MCHC  32.9   RDW  16.3*   PLT  269     Coags:  Recent Labs   Lab Test  08/22/18   1838  08/07/18   0910   INR  1.04  1.00   PTT   --   30       Repeat EKG = pending                 Anesthesia Plan      History & Physical Review  History and physical reviewed and following examination; no interval change.    ASA Status:  3 .    NPO Status:  > 8 hours and > 6 hours    Plan for General and ETT with Intravenous induction. Maintenance will be Inhalation and Balanced.    PONV prophylaxis:  Ondansetron (or other 5HT-3) and Dexamethasone or Solumedrol  History and physical assessed; Patient examined.  I have reviewed and agree with this pre-op assessment and anesthetic plan.  Patient and family also agree.   Risks and alternatives presented and discussed.   AQA.  -rcp        Postoperative Care  Postoperative pain management:  IV analgesics.      Consents  Anesthetic plan, risks, benefits and alternatives discussed with:  Patient.  Use of blood products discussed: No .   .                          .

## 2018-09-13 ENCOUNTER — SURGERY (OUTPATIENT)
Age: 63
End: 2018-09-13

## 2018-09-13 ENCOUNTER — APPOINTMENT (OUTPATIENT)
Dept: GENERAL RADIOLOGY | Facility: CLINIC | Age: 63
End: 2018-09-13
Attending: INTERNAL MEDICINE
Payer: COMMERCIAL

## 2018-09-13 ENCOUNTER — ANESTHESIA (OUTPATIENT)
Dept: SURGERY | Facility: CLINIC | Age: 63
End: 2018-09-13
Payer: COMMERCIAL

## 2018-09-13 ENCOUNTER — HOSPITAL ENCOUNTER (OUTPATIENT)
Facility: CLINIC | Age: 63
Discharge: HOME | End: 2018-09-13
Attending: INTERNAL MEDICINE | Admitting: INTERNAL MEDICINE
Payer: COMMERCIAL

## 2018-09-13 VITALS
WEIGHT: 238.1 LBS | DIASTOLIC BLOOD PRESSURE: 75 MMHG | BODY MASS INDEX: 42.19 KG/M2 | HEIGHT: 63 IN | OXYGEN SATURATION: 99 % | TEMPERATURE: 97.5 F | SYSTOLIC BLOOD PRESSURE: 163 MMHG | RESPIRATION RATE: 16 BRPM

## 2018-09-13 DIAGNOSIS — I10 BENIGN ESSENTIAL HYPERTENSION: Primary | ICD-10-CM

## 2018-09-13 DIAGNOSIS — K80.50 CHOLEDOCHOLITHIASIS: ICD-10-CM

## 2018-09-13 LAB
ALBUMIN SERPL-MCNC: 3.3 G/DL (ref 3.4–5)
ALP SERPL-CCNC: 146 U/L (ref 40–150)
ALT SERPL W P-5'-P-CCNC: 63 U/L (ref 0–50)
AMYLASE SERPL-CCNC: 26 U/L (ref 30–110)
ANION GAP SERPL CALCULATED.3IONS-SCNC: 8 MMOL/L (ref 3–14)
AST SERPL W P-5'-P-CCNC: 27 U/L (ref 0–45)
BILIRUB SERPL-MCNC: 1.5 MG/DL (ref 0.2–1.3)
BUN SERPL-MCNC: 17 MG/DL (ref 7–30)
CALCIUM SERPL-MCNC: 9.2 MG/DL (ref 8.5–10.1)
CHLORIDE SERPL-SCNC: 105 MMOL/L (ref 94–109)
CO2 SERPL-SCNC: 27 MMOL/L (ref 20–32)
CREAT SERPL-MCNC: 0.54 MG/DL (ref 0.52–1.04)
ERCP: NORMAL
ERYTHROCYTE [DISTWIDTH] IN BLOOD BY AUTOMATED COUNT: 16.3 % (ref 10–15)
GFR SERPL CREATININE-BSD FRML MDRD: >90 ML/MIN/1.7M2
GLUCOSE BLDC GLUCOMTR-MCNC: 107 MG/DL (ref 70–99)
GLUCOSE SERPL-MCNC: 96 MG/DL (ref 70–99)
HCT VFR BLD AUTO: 41.4 % (ref 35–47)
HGB BLD-MCNC: 13.6 G/DL (ref 11.7–15.7)
LIPASE SERPL-CCNC: 104 U/L (ref 73–393)
MCH RBC QN AUTO: 31.9 PG (ref 26.5–33)
MCHC RBC AUTO-ENTMCNC: 32.9 G/DL (ref 31.5–36.5)
MCV RBC AUTO: 97 FL (ref 78–100)
PLATELET # BLD AUTO: 269 10E9/L (ref 150–450)
POTASSIUM SERPL-SCNC: 3.6 MMOL/L (ref 3.4–5.3)
PROT SERPL-MCNC: 7.5 G/DL (ref 6.8–8.8)
RBC # BLD AUTO: 4.27 10E12/L (ref 3.8–5.2)
SODIUM SERPL-SCNC: 140 MMOL/L (ref 133–144)
WBC # BLD AUTO: 8.7 10E9/L (ref 4–11)

## 2018-09-13 PROCEDURE — 71000014 ZZH RECOVERY PHASE 1 LEVEL 2 FIRST HR: Performed by: INTERNAL MEDICINE

## 2018-09-13 PROCEDURE — 82150 ASSAY OF AMYLASE: CPT | Performed by: INTERNAL MEDICINE

## 2018-09-13 PROCEDURE — 25000128 H RX IP 250 OP 636

## 2018-09-13 PROCEDURE — C1726 CATH, BAL DIL, NON-VASCULAR: HCPCS | Performed by: INTERNAL MEDICINE

## 2018-09-13 PROCEDURE — 27210794 ZZH OR GENERAL SUPPLY STERILE: Performed by: INTERNAL MEDICINE

## 2018-09-13 PROCEDURE — 88313 SPECIAL STAINS GROUP 2: CPT | Performed by: INTERNAL MEDICINE

## 2018-09-13 PROCEDURE — 36415 COLL VENOUS BLD VENIPUNCTURE: CPT | Performed by: INTERNAL MEDICINE

## 2018-09-13 PROCEDURE — 36000059 ZZH SURGERY LEVEL 3 EA 15 ADDTL MIN UMMC: Performed by: INTERNAL MEDICINE

## 2018-09-13 PROCEDURE — 25000566 ZZH SEVOFLURANE, EA 15 MIN: Performed by: INTERNAL MEDICINE

## 2018-09-13 PROCEDURE — 93005 ELECTROCARDIOGRAM TRACING: CPT

## 2018-09-13 PROCEDURE — 40000065 ZZH STATISTIC EKG NON-CHARGEABLE

## 2018-09-13 PROCEDURE — 40000277 XR SURGERY CARM FLUORO LESS THAN 5 MIN W STILLS: Mod: TC

## 2018-09-13 PROCEDURE — 83690 ASSAY OF LIPASE: CPT | Performed by: INTERNAL MEDICINE

## 2018-09-13 PROCEDURE — 80053 COMPREHEN METABOLIC PANEL: CPT | Performed by: INTERNAL MEDICINE

## 2018-09-13 PROCEDURE — 71000027 ZZH RECOVERY PHASE 2 EACH 15 MINS: Performed by: INTERNAL MEDICINE

## 2018-09-13 PROCEDURE — 93010 ELECTROCARDIOGRAM REPORT: CPT | Performed by: INTERNAL MEDICINE

## 2018-09-13 PROCEDURE — 85027 COMPLETE CBC AUTOMATED: CPT | Performed by: INTERNAL MEDICINE

## 2018-09-13 PROCEDURE — 25500064 ZZH RX 255 OP 636: Performed by: INTERNAL MEDICINE

## 2018-09-13 PROCEDURE — 25000125 ZZHC RX 250: Performed by: INTERNAL MEDICINE

## 2018-09-13 PROCEDURE — 88305 TISSUE EXAM BY PATHOLOGIST: CPT | Performed by: INTERNAL MEDICINE

## 2018-09-13 PROCEDURE — 37000009 ZZH ANESTHESIA TECHNICAL FEE, EACH ADDTL 15 MIN: Performed by: INTERNAL MEDICINE

## 2018-09-13 PROCEDURE — 40000170 ZZH STATISTIC PRE-PROCEDURE ASSESSMENT II: Performed by: INTERNAL MEDICINE

## 2018-09-13 PROCEDURE — 82962 GLUCOSE BLOOD TEST: CPT

## 2018-09-13 PROCEDURE — 36000061 ZZH SURGERY LEVEL 3 W FLUORO 1ST 30 MIN - UMMC: Performed by: INTERNAL MEDICINE

## 2018-09-13 PROCEDURE — 25000125 ZZHC RX 250

## 2018-09-13 PROCEDURE — 37000008 ZZH ANESTHESIA TECHNICAL FEE, 1ST 30 MIN: Performed by: INTERNAL MEDICINE

## 2018-09-13 PROCEDURE — C1769 GUIDE WIRE: HCPCS | Performed by: INTERNAL MEDICINE

## 2018-09-13 RX ORDER — LEVOFLOXACIN 500 MG/1
500 TABLET, FILM COATED ORAL DAILY
Qty: 5 TABLET | Refills: 0 | Status: SHIPPED | OUTPATIENT
Start: 2018-09-13 | End: 2018-09-18

## 2018-09-13 RX ORDER — LEVOFLOXACIN 5 MG/ML
INJECTION, SOLUTION INTRAVENOUS PRN
Status: DISCONTINUED | OUTPATIENT
Start: 2018-09-13 | End: 2018-09-13

## 2018-09-13 RX ORDER — LIDOCAINE 40 MG/G
CREAM TOPICAL
Status: DISCONTINUED | OUTPATIENT
Start: 2018-09-13 | End: 2018-09-13 | Stop reason: HOSPADM

## 2018-09-13 RX ORDER — INDOMETHACIN 50 MG/1
100 SUPPOSITORY RECTAL
Status: COMPLETED | OUTPATIENT
Start: 2018-09-13 | End: 2018-09-13

## 2018-09-13 RX ORDER — LABETALOL HYDROCHLORIDE 5 MG/ML
10 INJECTION, SOLUTION INTRAVENOUS
Status: DISCONTINUED | OUTPATIENT
Start: 2018-09-13 | End: 2018-09-13 | Stop reason: HOSPADM

## 2018-09-13 RX ORDER — SODIUM CHLORIDE, SODIUM LACTATE, POTASSIUM CHLORIDE, CALCIUM CHLORIDE 600; 310; 30; 20 MG/100ML; MG/100ML; MG/100ML; MG/100ML
INJECTION, SOLUTION INTRAVENOUS CONTINUOUS
Status: DISCONTINUED | OUTPATIENT
Start: 2018-09-13 | End: 2018-09-13 | Stop reason: HOSPADM

## 2018-09-13 RX ORDER — ONDANSETRON 2 MG/ML
INJECTION INTRAMUSCULAR; INTRAVENOUS PRN
Status: DISCONTINUED | OUTPATIENT
Start: 2018-09-13 | End: 2018-09-13

## 2018-09-13 RX ORDER — HYDROCHLOROTHIAZIDE 25 MG/1
25 TABLET ORAL DAILY
Qty: 14 TABLET | Refills: 0 | Status: SHIPPED | OUTPATIENT
Start: 2018-09-13 | End: 2018-10-03

## 2018-09-13 RX ORDER — DEXAMETHASONE SODIUM PHOSPHATE 4 MG/ML
INJECTION, SOLUTION INTRA-ARTICULAR; INTRALESIONAL; INTRAMUSCULAR; INTRAVENOUS; SOFT TISSUE PRN
Status: DISCONTINUED | OUTPATIENT
Start: 2018-09-13 | End: 2018-09-13

## 2018-09-13 RX ORDER — FLUMAZENIL 0.1 MG/ML
0.2 INJECTION, SOLUTION INTRAVENOUS
Status: CANCELLED | OUTPATIENT
Start: 2018-09-13 | End: 2018-09-14

## 2018-09-13 RX ORDER — ONDANSETRON 4 MG/1
4 TABLET, ORALLY DISINTEGRATING ORAL EVERY 30 MIN PRN
Status: DISCONTINUED | OUTPATIENT
Start: 2018-09-13 | End: 2018-09-13 | Stop reason: HOSPADM

## 2018-09-13 RX ORDER — NALOXONE HYDROCHLORIDE 0.4 MG/ML
.1-.4 INJECTION, SOLUTION INTRAMUSCULAR; INTRAVENOUS; SUBCUTANEOUS
Status: CANCELLED | OUTPATIENT
Start: 2018-09-13 | End: 2018-09-14

## 2018-09-13 RX ORDER — GLYCOPYRROLATE 0.2 MG/ML
INJECTION, SOLUTION INTRAMUSCULAR; INTRAVENOUS PRN
Status: DISCONTINUED | OUTPATIENT
Start: 2018-09-13 | End: 2018-09-13

## 2018-09-13 RX ORDER — NALOXONE HYDROCHLORIDE 0.4 MG/ML
.1-.4 INJECTION, SOLUTION INTRAMUSCULAR; INTRAVENOUS; SUBCUTANEOUS
Status: DISCONTINUED | OUTPATIENT
Start: 2018-09-13 | End: 2018-09-13 | Stop reason: HOSPADM

## 2018-09-13 RX ORDER — MEPERIDINE HYDROCHLORIDE 25 MG/ML
12.5 INJECTION INTRAMUSCULAR; INTRAVENOUS; SUBCUTANEOUS
Status: DISCONTINUED | OUTPATIENT
Start: 2018-09-13 | End: 2018-09-13 | Stop reason: HOSPADM

## 2018-09-13 RX ORDER — FENTANYL CITRATE 50 UG/ML
25-50 INJECTION, SOLUTION INTRAMUSCULAR; INTRAVENOUS
Status: DISCONTINUED | OUTPATIENT
Start: 2018-09-13 | End: 2018-09-13 | Stop reason: HOSPADM

## 2018-09-13 RX ORDER — ACETAMINOPHEN 650 MG/1
650 SUPPOSITORY RECTAL EVERY 4 HOURS PRN
Status: DISCONTINUED | OUTPATIENT
Start: 2018-09-13 | End: 2018-09-13 | Stop reason: HOSPADM

## 2018-09-13 RX ORDER — SODIUM CHLORIDE, SODIUM LACTATE, POTASSIUM CHLORIDE, CALCIUM CHLORIDE 600; 310; 30; 20 MG/100ML; MG/100ML; MG/100ML; MG/100ML
INJECTION, SOLUTION INTRAVENOUS CONTINUOUS PRN
Status: DISCONTINUED | OUTPATIENT
Start: 2018-09-13 | End: 2018-09-13

## 2018-09-13 RX ORDER — PROPOFOL 10 MG/ML
INJECTION, EMULSION INTRAVENOUS PRN
Status: DISCONTINUED | OUTPATIENT
Start: 2018-09-13 | End: 2018-09-13

## 2018-09-13 RX ORDER — ONDANSETRON 2 MG/ML
4 INJECTION INTRAMUSCULAR; INTRAVENOUS EVERY 30 MIN PRN
Status: DISCONTINUED | OUTPATIENT
Start: 2018-09-13 | End: 2018-09-13 | Stop reason: HOSPADM

## 2018-09-13 RX ORDER — FENTANYL CITRATE 50 UG/ML
INJECTION, SOLUTION INTRAMUSCULAR; INTRAVENOUS PRN
Status: DISCONTINUED | OUTPATIENT
Start: 2018-09-13 | End: 2018-09-13

## 2018-09-13 RX ORDER — LIDOCAINE HYDROCHLORIDE 20 MG/ML
INJECTION, SOLUTION INFILTRATION; PERINEURAL PRN
Status: DISCONTINUED | OUTPATIENT
Start: 2018-09-13 | End: 2018-09-13

## 2018-09-13 RX ORDER — IOPAMIDOL 510 MG/ML
INJECTION, SOLUTION INTRAVASCULAR PRN
Status: DISCONTINUED | OUTPATIENT
Start: 2018-09-13 | End: 2018-09-13 | Stop reason: HOSPADM

## 2018-09-13 RX ADMIN — PHENYLEPHRINE HYDROCHLORIDE 100 MCG: 10 INJECTION, SOLUTION INTRAMUSCULAR; INTRAVENOUS; SUBCUTANEOUS at 10:44

## 2018-09-13 RX ADMIN — PHENYLEPHRINE HYDROCHLORIDE 50 MCG: 10 INJECTION, SOLUTION INTRAMUSCULAR; INTRAVENOUS; SUBCUTANEOUS at 09:56

## 2018-09-13 RX ADMIN — MIDAZOLAM 1 MG: 1 INJECTION INTRAMUSCULAR; INTRAVENOUS at 09:43

## 2018-09-13 RX ADMIN — WATER 100 ML: 100 IRRIGANT IRRIGATION at 10:27

## 2018-09-13 RX ADMIN — SODIUM CHLORIDE, POTASSIUM CHLORIDE, SODIUM LACTATE AND CALCIUM CHLORIDE: 600; 310; 30; 20 INJECTION, SOLUTION INTRAVENOUS at 09:43

## 2018-09-13 RX ADMIN — LIDOCAINE HYDROCHLORIDE 40 MG: 20 INJECTION, SOLUTION INFILTRATION; PERINEURAL at 09:43

## 2018-09-13 RX ADMIN — GLYCOPYRROLATE 0.2 MG: 0.2 INJECTION, SOLUTION INTRAMUSCULAR; INTRAVENOUS at 09:43

## 2018-09-13 RX ADMIN — INDOMETHACIN 100 MG: 50 SUPPOSITORY RECTAL at 11:34

## 2018-09-13 RX ADMIN — PROPOFOL 130 MG: 10 INJECTION, EMULSION INTRAVENOUS at 09:43

## 2018-09-13 RX ADMIN — FENTANYL CITRATE 50 MCG: 50 INJECTION, SOLUTION INTRAMUSCULAR; INTRAVENOUS at 09:43

## 2018-09-13 RX ADMIN — IOPAMIDOL 60 ML: 510 INJECTION, SOLUTION INTRAVASCULAR at 10:27

## 2018-09-13 RX ADMIN — FENTANYL CITRATE 50 MCG: 50 INJECTION, SOLUTION INTRAMUSCULAR; INTRAVENOUS at 11:38

## 2018-09-13 RX ADMIN — SIMETHICONE 2 ML: 63.3; 3.7 SOLUTION/ DROPS ORAL at 10:26

## 2018-09-13 RX ADMIN — PHENYLEPHRINE HYDROCHLORIDE 150 MCG: 10 INJECTION, SOLUTION INTRAMUSCULAR; INTRAVENOUS; SUBCUTANEOUS at 10:17

## 2018-09-13 RX ADMIN — PHENYLEPHRINE HYDROCHLORIDE 100 MCG: 10 INJECTION, SOLUTION INTRAMUSCULAR; INTRAVENOUS; SUBCUTANEOUS at 10:34

## 2018-09-13 RX ADMIN — ROCURONIUM BROMIDE 70 MG: 10 INJECTION INTRAVENOUS at 09:46

## 2018-09-13 RX ADMIN — SUGAMMADEX 200 MG: 100 INJECTION, SOLUTION INTRAVENOUS at 11:43

## 2018-09-13 RX ADMIN — PHENYLEPHRINE HYDROCHLORIDE 0.05 MCG/KG/MIN: 10 INJECTION, SOLUTION INTRAMUSCULAR; INTRAVENOUS; SUBCUTANEOUS at 10:25

## 2018-09-13 RX ADMIN — PHENYLEPHRINE HYDROCHLORIDE 100 MCG: 10 INJECTION, SOLUTION INTRAMUSCULAR; INTRAVENOUS; SUBCUTANEOUS at 11:17

## 2018-09-13 RX ADMIN — LEVOFLOXACIN 500 MG: 5 INJECTION, SOLUTION INTRAVENOUS at 09:52

## 2018-09-13 RX ADMIN — PHENYLEPHRINE HYDROCHLORIDE 100 MCG: 10 INJECTION, SOLUTION INTRAMUSCULAR; INTRAVENOUS; SUBCUTANEOUS at 10:13

## 2018-09-13 RX ADMIN — PHENYLEPHRINE HYDROCHLORIDE 100 MCG: 10 INJECTION, SOLUTION INTRAMUSCULAR; INTRAVENOUS; SUBCUTANEOUS at 10:08

## 2018-09-13 RX ADMIN — ONDANSETRON 4 MG: 2 INJECTION INTRAMUSCULAR; INTRAVENOUS at 10:48

## 2018-09-13 RX ADMIN — PHENYLEPHRINE HYDROCHLORIDE 50 MCG: 10 INJECTION, SOLUTION INTRAMUSCULAR; INTRAVENOUS; SUBCUTANEOUS at 11:08

## 2018-09-13 RX ADMIN — DEXAMETHASONE SODIUM PHOSPHATE 6 MG: 4 INJECTION, SOLUTION INTRA-ARTICULAR; INTRALESIONAL; INTRAMUSCULAR; INTRAVENOUS; SOFT TISSUE at 09:52

## 2018-09-13 NOTE — LETTER
"September 18, 2018      Naga Harman  [address] [place] [address]        Dear ,    The biopsy I took from the polyp I saw inside the gallbladder came back as completely benign. It was basically from some inflammation probably related to the stone rubbing against it. Nothing to worry about. I would still like you to meet with a surgeon to talk about removing your gallbladder.      Resulted Orders   Surgical pathology exam   Result Value Ref Range    Copath Report       Patient Name: NAGA HARMAN  MR#: [id number]  Specimen #: [id number]  Collected: 9/13/2018  Received: 9/13/2018  Reported: 9/14/2018 15:56  Ordering Phy(s): MAKEDA ROSAS    For improved result formatting, select 'View Enhanced Report Format' under   Linked Documents section.    SPECIMEN(S):  Gallbladder polyp    FINAL DIAGNOSIS:  GALLBLADDER POLYP, BIOPSY:  - Benign inflammatory polyp with mixed inflammation, fibrosis  - Negative for dysplasia and malignancy    COMMENT:  A congo red stain has been ordered and will be reported in an addendum.  Dr. Helton has reviewed the case and concurs.    I have personally reviewed all specimens and/or slides, including the   listed special stains, and used them  with my medical judgement to determine or confirm the final diagnosis.    Electronically signed out by:    Robert Potts M.D., [facility]    CLINICAL HISTORY:  Bile duct stone(s). 62 year old female with a history of obesity and   hypertension who presented with painless  jaundice wi th MRI revealing obstructing stones  GROSS:  The specimen is received in formalin with proper patient identification,   labeled \"gallbladder polyp\".  The  specimen consists of four pieces of pink-tan soft tissue ranging in size   from less than 0.1-0.1 cm in greatest  dimension, which are entirely submitted in cassette A1. (Dictated by:   Penelope Delaney 9/13/2018 01:32 PM)    MICROSCOPIC:  Microscopic examination was performed.    CPT " Codes:  A: 53846-QD1, 43436-SILK    TESTING LAB LOCATION:  [facility]  [facility] [address] [place]   [id number] [phone]    COLLECTION SITE:  Client: [facility]  Location: [facility] (B)    Resident  [id number]         If you have any questions or concerns, please call the clinic at the number listed above.     Sincerely,    Manny Cooper MD  [facility]  Division of Gastroenterology and Hepatology  [facility] [address] [place], [place] [address]

## 2018-09-13 NOTE — IP AVS SNAPSHOT
Same Day Surgery [address] [address]    Phone:  [phone]                                       After Visit Summary   9/13/2018    Marlin Harman    MRN: [id number]           After Visit Summary Signature Page     I have received my discharge instructions, and my questions have been answered. I have discussed any challenges I see with this plan with the nurse or doctor.    ..........................................................................................................................................  Patient/Patient Representative Signature      ..........................................................................................................................................  Patient Representative Print Name and Relationship to Patient    ..................................................               ................................................  Date                                   Time    ..........................................................................................................................................  Reviewed by Signature/Title    ...................................................              ..............................................  Date                                               Time          22EPIC Rev 08/18

## 2018-09-13 NOTE — ANESTHESIA POSTPROCEDURE EVALUATION
Patient: Marlin Harman    Procedure(s):  Endoscopic Retrograde Cholangiopancreatogram, with Stent Removal, Spyglass Cholangioscopy with Electro [place] Liptotripsy - Wound Class: II-Clean Contaminated    Diagnosis:Obstruction Of Bile Duct   Diagnosis Additional Information: No value filed.    Anesthesia Type:  General, ETT    Note:  Anesthesia Post Evaluation    Patient location during evaluation: PACU  Patient participation: Able to fully participate in evaluation  Level of consciousness: sleepy but conscious and awake  Pain management: adequate  Airway patency: patent  Cardiovascular status: acceptable  Respiratory status: acceptable  Hydration status: acceptable  PONV: controlled     Anesthetic complications: None    Comments: Patient awake to voice, clear a/w.  Stable VS.  No new or current issues evident at this time.  OK out per PACU protocol.  --rcp          Last vitals:  Vitals:    09/13/18 0805 09/13/18 0845   BP: (!) 176/94 (!) 180/94   Resp: 16    Temp: 36.6  C (97.9  F)    SpO2: 98%          Electronically Signed By: Javy Ames MD  September 13, 2018  12:02 PM

## 2018-09-13 NOTE — OR NURSING
Dr Conway notified that EKG completed,  At this time he stated they would treat her blood pressure in the OR as needed.

## 2018-09-13 NOTE — IP AVS SNAPSHOT
MRN:[id number]                      After Visit Summary   9/13/2018    Marlin Harman    MRN: [id number]           Thank you!     Thank you for choosing [facility] for your care. Our goal is always to provide you with excellent care. Hearing back from our patients is one way we can continue to improve our services. Please take a few minutes to complete the written survey that you may receive in the mail after you visit with us. Thank you!        Patient Information     Date Of Birth          1955        About your hospital stay     You were admitted on:  September 13, 2018 You last received care in the:  Same Day Surgery [facility]    You were discharged on:  September 13, 2018       Who to Call     For medical emergencies, please call 911.  For non-urgent questions about your medical care, please call your primary care provider or clinic, [phone]  For questions related to your surgery, please call your surgery clinic        Attending Provider     Provider Specialty    Manny Cooper MD Gastroenterology       Primary Care Provider Office Phone # Fax #    [facility] [phone]      After Care Instructions     Discharge Instructions       Resume pre procedure diet            Discharge Instructions       Restart home medications.                  Further instructions from your care team       [facility], [facility]; Same-Day Surgery   Adult Discharge Orders & Instructions   For 24 hours after surgery    1. Get plenty of rest.  A responsible adult must stay with you for at least 24 hours after you leave the hospital.   2. Do not drive or use heavy equipment.  If you have weakness or tingling, don't drive or use heavy equipment until this feeling goes away.  3. Do not drink alcohol.  4. Avoid strenuous or risky activities.  Ask for help when climbing stairs.   5. You may feel lightheaded.  IF so, sit for a few minutes before  standing.  Have someone help you get up.   6. If you have nausea (feel sick to your stomach): Drink only clear liquids such as apple juice, ginger ale, broth or 7-Up.  Rest may also help.  Be sure to drink enough fluids.  Move to a regular diet as you feel able.  7. You may have a slight fever. Call the doctor if your fever is over 100 F (37.7 C) (taken under the tongue) or lasts longer than 24 hours.  8. You may have a dry mouth, a sore throat, muscle aches or trouble sleeping.  These should go away after 24 hours.  9. Do not make important or legal decisions.   Call your doctor for any of the followin.  Signs of infection (fever, growing tenderness at the surgery site, a large amount of drainage or bleeding, severe pain, foul-smelling drainage, redness, swelling).    2. It has been over 8 to 10 hours since surgery and you are still not able to urinate (pass water).    3.  Headache for over 24 hours.  To contact a doctor, call ________________________________________ or:        [phone] and ask for the resident on call for GI medicine (answered 24 hours a day)      Emergency Department:    [facility]: [phone]       (TTY for hearing impaired: [phone])            Additional Information     If you use hormonal birth control (such as the pill, patch, ring or implants): You'll need a second form of birth control for 7 days (condoms, a diaphragm or contraceptive foam). While in the hospital, you received a medicine called Bridion. Your normal birth control will not work as well for a week after taking this medicine.          Pending Results     Date and Time Order Name Status Description    2018 1116 Surgical pathology exam In process     2018 0854 EKG 12-lead, tracing only Preliminary             Admission Information     Date & Time Provider Department Dept. Phone    2018 Manny Cooper MD Same Day Surgery [facility] [phone]      Your Vitals Were     Blood Pressure  "Temperature Respirations Height Weight Pulse Oximetry    170/82 (BP Location: Right arm) 98.1  F (36.7  C) (Oral) 15 1.6 m (5' 3\") 108 kg (238 lb 1.6 oz) 99%    BMI (Body Mass Index)                   42.18 kg/m2           Care EveryWhere ID     This is your Care EveryWhere ID. This could be used by other organizations to access your [facility] medical records  [id number]        Equal Access to Services     TIARA MARX : Hadii ciera holloway hadasho Soomaali, waaxda luqadaha, qaybta kaalmada adeegyada, paulo bland . So [facility] [phone].    ATENCIÓN: Si kyla pamela, tiene a lopez disposición servicios gratuitos de asistencia lingüística. Llame al [phone].    We comply with applicable federal civil rights laws and [place] laws. We do not discriminate on the basis of race, color, national origin, age, disability, sex, sexual orientation, or gender identity.               Review of your medicines      START taking        Dose / Directions    levofloxacin 500 MG tablet   Commonly known as:  LEVAQUIN   Used for:  Choledocholithiasis        Dose:  500 mg   Take 1 tablet (500 mg) by mouth daily for 5 days   Quantity:  5 tablet   Refills:  0         CONTINUE these medicines which have NOT CHANGED        Dose / Directions    aspirin 81 MG chewable tablet        Dose:  81 mg   Take 81 mg by mouth daily   Refills:  0       Cranberry 250 MG Caps        Refills:  0       cyclobenzaprine 10 MG tablet   Commonly known as:  FLEXERIL   Used for:  Obstruction of bile duct        Dose:  10 mg   Take 1 tablet (10 mg) by mouth At Bedtime   Quantity:  4 tablet   Refills:  0       diphenhydrAMINE 25 MG tablet   Commonly known as:  BENADRYL        Dose:  25 mg   Take 25 mg by mouth nightly as needed for itching or allergies   Refills:  0       folic acid 1 MG tablet   Commonly known as:  FOLVITE        Dose:  1 mg   Take 1 mg by mouth daily   Refills:  0       Glucosamine Sulfate 500 MG Tabs        Dose:  1 " tablet   Take 1 tablet by mouth daily   Refills:  0       hydrochlorothiazide 25 MG tablet   Commonly known as:  HYDRODIURIL   Used for:  Benign essential hypertension        Dose:  25 mg   Take 1 tablet (25 mg) by mouth daily for 14 days   Quantity:  14 tablet   Refills:  0       loratadine 10 MG tablet   Commonly known as:  CLARITIN        Dose:  10 mg   Take 10 mg by mouth daily   Refills:  0       vitamin B complex with vitamin C Tabs tablet        Dose:  1 tablet   Take 1 tablet by mouth daily   Refills:  0       vitamin E 400 UNIT capsule        Dose:  400 Units   Take 400 Units by mouth daily   Refills:  0            Where to get your medicines      These medications were sent to [facility] [place], [place] - [address]  [address] [address]     Phone:  [phone]     hydrochlorothiazide 25 MG tablet    levofloxacin 500 MG tablet                Protect others around you: Learn how to safely use, store and throw away your medicines at www.disposemymeds.org.        ANTIBIOTIC INSTRUCTION     You've Been Prescribed an Antibiotic - Now What?  Your healthcare team thinks that you or your loved one might have an infection. Some infections can be treated with antibiotics, which are powerful, life-saving drugs. Like all medications, antibiotics have side effects and should only be used when necessary. There are some important things you should know about your antibiotic treatment.      Your healthcare team may run tests before you start taking an antibiotic.    Your team may take samples (e.g., from your blood, urine or other areas) to run tests to look for bacteria. These test can be important to determine if you need an antibiotic at all and, if you do, which antibiotic will work best.      Within a few days, your healthcare team might change or even stop your antibiotic.    Your team may start you on an antibiotic while they are working to find out what is making  you sick.    Your team might change your antibiotic because test results show that a different antibiotic would be better to treat your infection.    In some cases, once your team has more information, they learn that you do not need an antibiotic at all. They may find out that you don't have an infection, or that the antibiotic you're taking won't work against your infection. For example, an infection caused by a virus can't be treated with antibiotics. Staying on an antibiotic when you don't need it is more likely to be harmful than helpful.      You may experience side effects from your antibiotic.    Like all medications, antibiotics have side effects. Some of these can be serious.    Let you healthcare team know if you have any known allergies when you are admitted to the hospital.    One significant side effect of nearly all antibiotics is the risk of severe and sometimes deadly diarrhea caused by Clostridium difficile (C. Difficile). This occurs when a person takes antibiotics because some good germs are destroyed. Antibiotic use allows C. diificile to take over, putting patients at high risk for this serious infection.    As a patient or caregiver, it is important to understand your or your loved one's antibiotic treatment. It is especially important for caregivers to speak up when patients can't speak for themselves. Here are some important questions to ask your healthcare team.    What infection is this antibiotic treating and how do you know I have that infection?    What side effects might occur from this antibiotic?    How long will I need to take this antibiotic?    Is it safe to take this antibiotic with other medications or supplements (e.g., vitamins) that I am taking?     Are there any special directions I need to know about taking this antibiotic? For example, should I take it with food?    How will I be monitored to know whether my infection is responding to the antibiotic?    What tests may help  to make sure the right antibiotic is prescribed for me?      Information provided by:  www.cdc.gov/getsmart  U.S. Department of Health and Human Services  Centers for disease Control and Prevention  National Center for Emerging and Zoonotic Infectious Diseases  Division of Healthcare Quality Promotion             Medication List: This is a list of all your medications and when to take them. Check marks below indicate your daily home schedule. Keep this list as a reference.      Medications           Morning Afternoon Evening Bedtime As Needed    aspirin 81 MG chewable tablet   Take 81 mg by mouth daily                                Cranberry 250 MG Caps                                cyclobenzaprine 10 MG tablet   Commonly known as:  FLEXERIL   Take 1 tablet (10 mg) by mouth At Bedtime                                diphenhydrAMINE 25 MG tablet   Commonly known as:  BENADRYL   Take 25 mg by mouth nightly as needed for itching or allergies                                folic acid 1 MG tablet   Commonly known as:  FOLVITE   Take 1 mg by mouth daily                                Glucosamine Sulfate 500 MG Tabs   Take 1 tablet by mouth daily                                hydrochlorothiazide 25 MG tablet   Commonly known as:  HYDRODIURIL   Take 1 tablet (25 mg) by mouth daily for 14 days                                levofloxacin 500 MG tablet   Commonly known as:  LEVAQUIN   Take 1 tablet (500 mg) by mouth daily for 5 days                                loratadine 10 MG tablet   Commonly known as:  CLARITIN   Take 10 mg by mouth daily                                vitamin B complex with vitamin C Tabs tablet   Take 1 tablet by mouth daily                                vitamin E 400 UNIT capsule   Take 400 Units by mouth daily

## 2018-09-13 NOTE — OR NURSING
Dr Conway in to see patient at 0840 till 0848.  He was notified that patient ran out of her hydrochlorathiazide 3 days ago and that at her last procedure when she was on the hydrochlorathiazide but developed lung congestions.  He was also notified of elevated blood pressure He ordered EKG

## 2018-09-13 NOTE — DISCHARGE INSTRUCTIONS
[facility], [place]; Same-Day Surgery   Adult Discharge Orders & Instructions   For 24 hours after surgery    1. Get plenty of rest.  A responsible adult must stay with you for at least 24 hours after you leave the hospital.   2. Do not drive or use heavy equipment.  If you have weakness or tingling, don't drive or use heavy equipment until this feeling goes away.  3. Do not drink alcohol.  4. Avoid strenuous or risky activities.  Ask for help when climbing stairs.   5. You may feel lightheaded.  IF so, sit for a few minutes before standing.  Have someone help you get up.   6. If you have nausea (feel sick to your stomach): Drink only clear liquids such as apple juice, ginger ale, broth or 7-Up.  Rest may also help.  Be sure to drink enough fluids.  Move to a regular diet as you feel able.  7. You may have a slight fever. Call the doctor if your fever is over 100 F (37.7 C) (taken under the tongue) or lasts longer than 24 hours.  8. You may have a dry mouth, a sore throat, muscle aches or trouble sleeping.  These should go away after 24 hours.  9. Do not make important or legal decisions.   Call your doctor for any of the followin.  Signs of infection (fever, growing tenderness at the surgery site, a large amount of drainage or bleeding, severe pain, foul-smelling drainage, redness, swelling).    2. It has been over 8 to 10 hours since surgery and you are still not able to urinate (pass water).    3.  Headache for over 24 hours.  To contact a doctor, call ________________________________________ or:        [phone] and ask for the resident on call for GI medicine (answered 24 hours a day)      Emergency Department:    [facility]: [phone]       (TTY for hearing impaired: [phone])

## 2018-09-13 NOTE — ANESTHESIA CARE TRANSFER NOTE
Patient: Marlin Harman    Procedure(s):  Endoscopic Retrograde Cholangiopancreatogram **Latex Allergy** - Wound Class: II-Clean Contaminated    Diagnosis: Obstruction Of Bile Duct   Diagnosis Additional Information: No value filed.    Anesthesia Type:   General, ETT     Note:  Airway :Face Mask  Patient transferred to:PACU  Comments: Pt alert, breathing spontaneously on 8L O2 via FM. VSS. Report shared with RN.Handoff Report: Identifed the Patient, Identified the Reponsible Provider, Reviewed the pertinent medical history, Discussed the surgical course, Reviewed Intra-OP anesthesia mangement and issues during anesthesia, Set expectations for post-procedure period and Allowed opportunity for questions and acknowledgement of understanding      Vitals: (Last set prior to Anesthesia Care Transfer)    CRNA VITALS  9/13/2018 1119 - 9/13/2018 1156      9/13/2018             NIBP: 142/63    NIBP Mean: 79    Ht Rate: 72    SpO2: 98 %                Electronically Signed By: MIR Pan CRNA  September 13, 2018  11:56 AM

## 2018-09-14 ENCOUNTER — CARE COORDINATION (OUTPATIENT)
Dept: GASTROENTEROLOGY | Facility: CLINIC | Age: 63
End: 2018-09-14

## 2018-09-14 DIAGNOSIS — K83.1 OBSTRUCTION OF BILE DUCT (H): Primary | ICD-10-CM

## 2018-09-14 LAB — INTERPRETATION ECG - MUSE: NORMAL

## 2018-09-14 NOTE — PROGRESS NOTES
[facility] RN Care Coordination Note:    Called and left a message for patient informing her that scheduling will be working on arranging a visit with general surgeon to discuss removal of gallbladder. Informed her if she has questions she can contact our office.        Ileana Steele RN   Care Coordinator   [phone]

## 2018-09-19 LAB — COPATH REPORT: NORMAL

## 2018-09-24 ENCOUNTER — PATIENT OUTREACH (OUTPATIENT)
Dept: CARE COORDINATION | Facility: CLINIC | Age: 63
End: 2018-09-24

## 2018-09-25 ENCOUNTER — TELEPHONE (OUTPATIENT)
Dept: SURGERY | Facility: CLINIC | Age: 63
End: 2018-09-25

## 2018-09-25 NOTE — TELEPHONE ENCOUNTER
Pre Visit Call and Assessment    Date of call:  09/25/2018    Phone numbers:  Home number on file [phone] (home)    Reached patient/confirmed appointment:  Yes  Patient care team/Primary provider:  Clinic, [facility]    Referred to:  Dr. Alex Rendon    Reason for visit: Larissa consult

## 2018-09-26 ENCOUNTER — DOCUMENTATION ONLY (OUTPATIENT)
Dept: SURGERY | Facility: CLINIC | Age: 63
End: 2018-09-26

## 2018-09-26 ENCOUNTER — OFFICE VISIT (OUTPATIENT)
Dept: SURGERY | Facility: CLINIC | Age: 63
End: 2018-09-26
Payer: COMMERCIAL

## 2018-09-26 VITALS
HEART RATE: 83 BPM | TEMPERATURE: 97.7 F | SYSTOLIC BLOOD PRESSURE: 152 MMHG | WEIGHT: 239.5 LBS | BODY MASS INDEX: 42.43 KG/M2 | DIASTOLIC BLOOD PRESSURE: 73 MMHG | OXYGEN SATURATION: 96 %

## 2018-09-26 DIAGNOSIS — K80.20 GALLSTONES: Primary | ICD-10-CM

## 2018-09-26 RX ORDER — CEFAZOLIN SODIUM 1 G/50ML
1 INJECTION, SOLUTION INTRAVENOUS SEE ADMIN INSTRUCTIONS
Status: CANCELLED | OUTPATIENT
Start: 2018-09-26

## 2018-09-26 ASSESSMENT — ENCOUNTER SYMPTOMS
SYNCOPE: 0
BOWEL INCONTINENCE: 0
VOMITING: 0
ABDOMINAL PAIN: 0
HYPOTENSION: 0
RECTAL PAIN: 0
BLOATING: 0
SLEEP DISTURBANCES DUE TO BREATHING: 0
ORTHOPNEA: 0
JAUNDICE: 1
LEG PAIN: 0
EXERCISE INTOLERANCE: 0
POOR WOUND HEALING: 0
HYPERTENSION: 1
CONSTIPATION: 0
NAIL CHANGES: 0
LIGHT-HEADEDNESS: 0
NAUSEA: 0
SKIN CHANGES: 0
PALPITATIONS: 0
DIARRHEA: 1
BLOOD IN STOOL: 0
HEARTBURN: 0

## 2018-09-26 NOTE — MR AVS SNAPSHOT
After Visit Summary   9/26/2018    Marlin Harman    MRN: [id number]           Patient Information     Date Of Birth          1955        Visit Information        Provider Department      9/26/2018 8:00 AM Alex Rendon MD [facility] Surgery        Today's Diagnoses     Gallstones    -  1      Care Instructions    After your Laparoscopic Cholecystectomy          Incision care     You may take a shower the day after surgery. Carefully wash your incision with soap and water. Do not submerge yourself in water (bath, whirlpool, hot tub, pool, lake) for 14 days after surgery.     Remove the bandage the day after surgery, but leave the medical tape (Steri-Strips) or glue in place. These will loosen and fall off on their own 5 to 7 days after surgery.       Always wash your hands before touching your incisions or removing bandages.      Other medicines     Wait to start aspirin or blood thinners until the day after surgery. You can continue your regular medicines at your normal time the day after surgery.      Your pain medicine may cause constipation (hard, dry stools). To help with this, take the stool softener your doctor gave you or an over-the-counter stool softener or laxative. You can stop taking this when you are no longer taking pain medicine and your bowel movements are back to normal.      For pain or discomfort     Take the narcotic pain medicine your doctor gave you as needed and as instructed on the bottle. If you prefer to use over-the-counter medication, use acetaminophen (Tylenol) or ibuprofen (Advil, Motrin) as instructed on the box. Do not take Tylenol if it is in your narcotic pain medication.     Use an ice pack on your abdomen (belly) for 20 minutes at a time as needed for the first 24 hours. Be sure to protect your skin by putting a cloth between the ice pack and your skin.     After 24 hours you can switch to heat for 20 minutes as needed. Be sure to  protect your skin by putting a cloth between the heat pack and your skin.       Activities     No driving until you feel it s safe to do so. Don t drive while taking narcotic pain medicine.     Don t lift anything heavier than 20 pounds for 3 to 4 weeks after surgery.      Special equipment     If you left the hospital in RIGO socks (compression stockings), you may take them off the day after surgery.      Diet     You can eat your regular meals after surgery.      When to call the doctor   Call your doctor if you have:     A fever above 101 F (38.3 C) (taken under the tongue), or a fever or chills lasting more than a day.     Redness at the incision site.     Any fluid or blood draining from the incision, especially if it smells bad.      Severe pain that doesn t improve with pain medicine.          We will call you 2 to 4 days after surgery to review this handout, answer questions and help arrange after-surgery care. If you have questions or concerns, please call [phone] during regular office hours. If you need to call after business hours, call [phone] and ask to page the surgeon on-call.         Transversus Abdominis Plane (TAP) Pain Block      What is a TAP block?   A TAP block can help you manage your pain after surgery. TAP stands for transversus abdominis plane, which is a muscle layer in your abdomen (belly). The TAP block uses numbing medicine similar to Novocaine to block pain near the site of your surgery.       Why get a TAP block?     To better manage your pain after surgery. A tap block will help keep the pain from getting severe and out of control.     To block pain signals from the nerve, which helps decrease pain after surgery.     To help you sleep, easily breathe deeply, walk and visit with others.      How is it done?   You will lie still on a table. We will use an ultrasound machine to help us see the correct muscle layer of your abdomen. Then, we ll use a needle to inject the  medicine. We may also give you some sleep medicine to lessen the pain of the injection.       The procedure takes between 5 and 15 minutes. It is usually done right before surgery, but will sometimes be after. It depends on your surgery and care needs.      What can I expect?     You may feel numbness, tingling or a heaviness in your abdomen.      You may have pain control up to 72 hours after surgery.     The TAP block may not lessen all of your surgery pain. But most patients feel 50 to 75 percent less pain than without the block.       Tell your nurse if you have:     Numbness or tingling in areas other than where the injection was     Blurry vision     Ringing in your ears     A metallic taste in your mouth            Follow-ups after your visit        Who to contact     Please call your clinic at [phone] to:    Ask questions about your health    Make or cancel appointments    Discuss your medicines    Learn about your test results    Speak to your doctor            Additional Information About Your Visit        Care EveryWhere ID     This is your Care EveryWhere ID. This could be used by other organizations to access your [place] medical records  [id number]        Your Vitals Were     Pulse Temperature Pulse Oximetry BMI (Body Mass Index)          83 97.7  F (36.5  C) (Oral) 96% 42.43 kg/m2         Blood Pressure from Last 3 Encounters:   09/26/18 152/73   09/13/18 163/75   08/23/18 121/63    Weight from Last 3 Encounters:   09/26/18 108.6 kg (239 lb 8 oz)   09/13/18 108 kg (238 lb 1.6 oz)   08/21/18 106.4 kg (234 lb 8 oz)              We Performed the Following     Ivis-Operative Worksheet (Generic)        Primary Care Provider Office Phone # Fax #    [facility] [phone]       [address] [address]        Equal Access to Services     TIARA MARX AH: anne Blount, paulo ibrahim  felicia bland ah. So [facility] [phone].    ATENCIÓN: Si diamond santiago, tiene a lopez disposición servicios gratuitos de asistencia lingüística. Cayden al [phone].    We comply with applicable federal civil rights laws and [place] laws. We do not discriminate on the basis of race, color, national origin, age, disability, sex, sexual orientation, or gender identity.            Thank you!     Thank you for choosing [facility]  for your care. Our goal is always to provide you with excellent care. Hearing back from our patients is one way we can continue to improve our services. Please take a few minutes to complete the written survey that you may receive in the mail after your visit with us. Thank you!             Your Updated Medication List - Protect others around you: Learn how to safely use, store and throw away your medicines at www.disposemymeds.org.          This list is accurate as of 9/26/18  8:50 AM.  Always use your most recent med list.                   Brand Name Dispense Instructions for use Diagnosis    aspirin 81 MG chewable tablet      Take 81 mg by mouth daily        Cranberry 250 MG Caps           cyclobenzaprine 10 MG tablet    FLEXERIL    4 tablet    Take 1 tablet (10 mg) by mouth At Bedtime    Obstruction of bile duct       diphenhydrAMINE 25 MG tablet    BENADRYL     Take 25 mg by mouth nightly as needed for itching or allergies        folic acid 1 MG tablet    FOLVITE     Take 1 mg by mouth daily        Glucosamine Sulfate 500 MG Tabs      Take 1 tablet by mouth daily        hydrochlorothiazide 25 MG tablet    HYDRODIURIL    14 tablet    Take 1 tablet (25 mg) by mouth daily for 14 days    Benign essential hypertension       loratadine 10 MG tablet    CLARITIN     Take 10 mg by mouth daily        vitamin B complex with vitamin C Tabs tablet      Take 1 tablet by mouth daily        vitamin E 400 UNIT capsule      Take 400 Units by mouth daily

## 2018-09-26 NOTE — LETTER
Date:September 27, 2018      Patient was self referred, no letter generated. Do not send.        [facility] Health Information

## 2018-09-26 NOTE — PATIENT INSTRUCTIONS
After your Laparoscopic Cholecystectomy          Incision care     You may take a shower the day after surgery. Carefully wash your incision with soap and water. Do not submerge yourself in water (bath, whirlpool, hot tub, pool, lake) for 14 days after surgery.     Remove the bandage the day after surgery, but leave the medical tape (Steri-Strips) or glue in place. These will loosen and fall off on their own 5 to 7 days after surgery.       Always wash your hands before touching your incisions or removing bandages.      Other medicines     Wait to start aspirin or blood thinners until the day after surgery. You can continue your regular medicines at your normal time the day after surgery.      Your pain medicine may cause constipation (hard, dry stools). To help with this, take the stool softener your doctor gave you or an over-the-counter stool softener or laxative. You can stop taking this when you are no longer taking pain medicine and your bowel movements are back to normal.      For pain or discomfort     Take the narcotic pain medicine your doctor gave you as needed and as instructed on the bottle. If you prefer to use over-the-counter medication, use acetaminophen (Tylenol) or ibuprofen (Advil, Motrin) as instructed on the box. Do not take Tylenol if it is in your narcotic pain medication.     Use an ice pack on your abdomen (belly) for 20 minutes at a time as needed for the first 24 hours. Be sure to protect your skin by putting a cloth between the ice pack and your skin.     After 24 hours you can switch to heat for 20 minutes as needed. Be sure to protect your skin by putting a cloth between the heat pack and your skin.       Activities     No driving until you feel it s safe to do so. Don t drive while taking narcotic pain medicine.     Don t lift anything heavier than 20 pounds for 3 to 4 weeks after surgery.      Special equipment     If you left the hospital in RIGO socks (compression stockings), you  may take them off the day after surgery.      Diet     You can eat your regular meals after surgery.      When to call the doctor   Call your doctor if you have:     A fever above 101 F (38.3 C) (taken under the tongue), or a fever or chills lasting more than a day.     Redness at the incision site.     Any fluid or blood draining from the incision, especially if it smells bad.      Severe pain that doesn t improve with pain medicine.          We will call you 2 to 4 days after surgery to review this handout, answer questions and help arrange after-surgery care. If you have questions or concerns, please call [phone] during regular office hours. If you need to call after business hours, call [phone] and ask to page the surgeon on-call.         Transversus Abdominis Plane (TAP) Pain Block      What is a TAP block?   A TAP block can help you manage your pain after surgery. TAP stands for transversus abdominis plane, which is a muscle layer in your abdomen (belly). The TAP block uses numbing medicine similar to Novocaine to block pain near the site of your surgery.       Why get a TAP block?     To better manage your pain after surgery. A tap block will help keep the pain from getting severe and out of control.     To block pain signals from the nerve, which helps decrease pain after surgery.     To help you sleep, easily breathe deeply, walk and visit with others.      How is it done?   You will lie still on a table. We will use an ultrasound machine to help us see the correct muscle layer of your abdomen. Then, we ll use a needle to inject the medicine. We may also give you some sleep medicine to lessen the pain of the injection.       The procedure takes between 5 and 15 minutes. It is usually done right before surgery, but will sometimes be after. It depends on your surgery and care needs.      What can I expect?     You may feel numbness, tingling or a heaviness in your abdomen.      You may have pain  control up to 72 hours after surgery.     The TAP block may not lessen all of your surgery pain. But most patients feel 50 to 75 percent less pain than without the block.       Tell your nurse if you have:     Numbness or tingling in areas other than where the injection was     Blurry vision     Ringing in your ears     A metallic taste in your mouth

## 2018-09-26 NOTE — PROGRESS NOTES
Patient is scheduled for surgery with Dr. Alex Rendon      Spoke with or Left message for: Marlin SHIRA Harman in clinic about surgery dates    Date of Surgery: 10/10/2018 at 7:30 AM with Dr. Alex Rendon    H&P: To be scheduled with [facility]. Patient is aware that she can call to schedule a pre-op with the [facility] ([facility]) if she is unable to schedule with her primary doctor.      Informed patient they will need an adult : YES    Scheduling note:  Dr. Rendon is on general surgery call the day of this case. This was discussed with him and he okayed scheduling this procedure during a week he is on call. He recommended mid-week as a first case.    Surgery packet sent: Given to patient in clinic on 09/26/2018    Additional comments: She also knows to call general surgery if she experiences any cold or flu symptoms. Surgery teaching and soap were given to in clinic on 09/26/2018. She was asked to call [phone] if she needs to reschedule and [phone] if she experiences any symptoms.

## 2018-09-26 NOTE — LETTER
9/26/2018       RE: Marlin Harman  [address]  [place] [address]     Dear Colleague,    Thank you for referring your patient, Marlin Harman, to the [facility] GENERAL SURGERY at [facility]. Please see a copy of my visit note below.    Recent history of asymptomatic jaundice.  Has had two ERCP procedures, last one was able to clear the duct.    PMH - HTN.  No history of screening colonoscopies nor mammographies due to varying pcp's in setting of intermittent insurance coverage.    Medications - hydochlorathiazide - uses intermittently in light of varying access to health care as noted above.  Smokes 4-6 cigarettes a day.  Lives with sister, who patient states can be trusted with any medical decision on behalf of patient in setting of patient being unable to communicate.  Physical exam- 62 year old female, no acute distress.  Abdomen soft, nondistended, protuberant abdomen.  No tenderness.  CT - gall stones present.  A/p - recent hx of asymptomatic gall stones.  Discussed risks/benefits of surgery vs nonoperative management with help of anatomic diagram.  Risks of surgery including bleeding, infection, damage to surrounding organs including bowel and common bile ducts discussed.  potetnail for complications becoming life threateing discussed.  Risks of nonoperative managemtn discussed.  potentail for conversion to open discussed.  Patient will need a preoperative physical.  This will need to be done in the hospital.  Will need CMP in a.m, of surgery.  Questions answered.  She is understadning of discussion and agreeable to proceed.    Again, thank you for allowing me to participate in the care of your patient.      Sincerely,    Alex Rendon MD

## 2018-09-26 NOTE — NURSING NOTE
Pre and Post op Patient Education/Teaching Flowsheet  Relevant Diagnosis:  Gallstones  Teaching Topic:  Pre and post op teaching  Person(s) Involved in teaching:  Patient     Motivation Level:  Asks Questions:  Yes  Eager to Learn:  Yes  Cooperative:  Yes  Receptive (willing/able to accept information):  Yes  Any cultural factors/Jainism beliefs that may influence understanding or compliance?  No    Patient/caregiver/family demonstrates understanding of the following:  Reason for the appointment, diagnosis, and treatment plan:  Yes  Patient demonstrates understanding of the following:  Pre-op bowel prep:  No  Post-op pain management recommendations (medications, ice compress, binder/athletic supporter (if applicable), etc.:  Yes  Inguinal hernia patients:  Post-op urinary retention- discussed signs/symptoms and visit to ER for Chung catheter placement and to stay in place for at least 48 hours:  NA  Restrictions:  Yes  Medications to take the day of surgery:  Per PCP  Blood thinner medications discussed and when to stop (if applicable):  Yes  Wound care:  Yes  Diabetes medication management (if applicable):  Per PCP  Which situations necessitate calling provider and whom to contact:  Discussed how to contact the hospital, nurse, and clinic scheduling staff if necessary      Date and time of surgery:  Yes  Location of surgery: [address] [place] [address]  History and Physical and any other testing necessary prior to surgery:  Yes  Required time line for completion of History and Physical and any pre-op testing:  Yes  Discuss need for someone to drive patient home and stay with them for 24 hours:  Yes  Pre-op showering/scrub information with Surgical Scrub:  Yes  NPO Guidelines:  NPO per Anesthesia Guidelines    Infection Prevention: Patient demonstrates understanding of the following:  Patient instructed on hand hygiene:  Yes  Surgical procedure site care will be taught and will be reviewed at the time of  discharge  Signs and symptoms of infection taught:  Yes  Wound care reviewed and will be taught at the time of discharge  Central venous catheter care will be taught at the time of discharge (if applicable)    Post-op follow-up:  Instructional materials used/given/mailed:  [facility] Surgery Booklet, post op teaching sheet, Map, Soap, and arrival/location information    Surgical instructions mailed to patient

## 2018-09-26 NOTE — NURSING NOTE
Chief Complaint   Patient presents with     Consult     NEW       Vitals:    09/26/18 0758   BP: 152/73   BP Location: Other (Comment)   Patient Position: Left side   Cuff Size: Adult Large   Pulse: 83   Temp: 97.7  F (36.5  C)   TempSrc: Oral   SpO2: 96%   Weight: 239 lb 8 oz       Body mass index is 42.43 kg/(m^2).      Mitch Melchor on 9/26/2018 at 8:06 AM

## 2018-09-27 NOTE — PROGRESS NOTES
Recent history of asymptomatic jaundice.  Has had two ERCP procedures, last one was able to clear the duct.    PMH - HTN.  No history of screening colonoscopies nor mammographies due to varying pcp's in setting of intermittent insurance coverage.    Medications - hydochlorathiazide - uses intermittently in light of varying access to health care as noted above.  Smokes 4-6 cigarettes a day.  Lives with sister, who patient states can be trusted with any medical decision on behalf of patient in setting of patient being unable to communicate.  Physical exam- 62 year old female, no acute distress.  Abdomen soft, nondistended, protuberant abdomen.  No tenderness.  CT - gall stones present.  A/p - recent hx of asymptomatic gall stones leading to jaundice.  Discussed risks/benefits of surgery vs nonoperative management with help of anatomic diagram.  Risks of surgery including bleeding, infection, damage to surrounding organs including bowel and common bile ducts discussed.  potetnail for complications becoming life threateing discussed.  Risks of nonoperative managemtn discussed.  potentail for conversion to open discussed.  Patient will need a preoperative physical.  This will need to be done in the hospital.  Will need CMP in a.m, of surgery.  Questions answered.  She is understadning of discussion and agreeable to proceed.

## 2018-10-03 ENCOUNTER — RADIANT APPOINTMENT (OUTPATIENT)
Dept: GENERAL RADIOLOGY | Facility: CLINIC | Age: 63
End: 2018-10-03
Attending: FAMILY MEDICINE
Payer: COMMERCIAL

## 2018-10-03 ENCOUNTER — OFFICE VISIT (OUTPATIENT)
Dept: FAMILY MEDICINE | Facility: CLINIC | Age: 63
End: 2018-10-03
Payer: COMMERCIAL

## 2018-10-03 VITALS
OXYGEN SATURATION: 98 % | HEART RATE: 86 BPM | DIASTOLIC BLOOD PRESSURE: 85 MMHG | BODY MASS INDEX: 42.34 KG/M2 | RESPIRATION RATE: 16 BRPM | TEMPERATURE: 97.6 F | SYSTOLIC BLOOD PRESSURE: 135 MMHG | WEIGHT: 239 LBS

## 2018-10-03 DIAGNOSIS — K80.50 CHOLEDOCHOLITHIASIS: ICD-10-CM

## 2018-10-03 DIAGNOSIS — E66.01 MORBID OBESITY WITH BMI OF 40.0-44.9, ADULT (H): ICD-10-CM

## 2018-10-03 DIAGNOSIS — J30.1 CHRONIC SEASONAL ALLERGIC RHINITIS DUE TO POLLEN: ICD-10-CM

## 2018-10-03 DIAGNOSIS — Z72.0 TOBACCO USE: ICD-10-CM

## 2018-10-03 DIAGNOSIS — Z01.818 PREOP GENERAL PHYSICAL EXAM: Primary | ICD-10-CM

## 2018-10-03 DIAGNOSIS — I10 BENIGN ESSENTIAL HYPERTENSION: ICD-10-CM

## 2018-10-03 DIAGNOSIS — G47.33 OSA (OBSTRUCTIVE SLEEP APNEA): ICD-10-CM

## 2018-10-03 PROCEDURE — 99215 OFFICE O/P EST HI 40 MIN: CPT | Performed by: FAMILY MEDICINE

## 2018-10-03 PROCEDURE — 71046 X-RAY EXAM CHEST 2 VIEWS: CPT | Mod: FY

## 2018-10-03 RX ORDER — ALBUTEROL SULFATE 90 UG/1
2 AEROSOL, METERED RESPIRATORY (INHALATION) EVERY 6 HOURS PRN
Qty: 1 INHALER | Refills: 1 | Status: SHIPPED | OUTPATIENT
Start: 2018-10-03 | End: 2019-05-31

## 2018-10-03 RX ORDER — HYDROCHLOROTHIAZIDE 25 MG/1
25 TABLET ORAL DAILY
Qty: 14 TABLET | Refills: 0 | Status: SHIPPED | OUTPATIENT
Start: 2018-10-03 | End: 2019-05-31

## 2018-10-03 RX ORDER — HYDROCHLOROTHIAZIDE 25 MG/1
25 TABLET ORAL DAILY
Qty: 90 TABLET | Refills: 1 | Status: SHIPPED | OUTPATIENT
Start: 2018-10-03 | End: 2018-10-08

## 2018-10-03 ASSESSMENT — PAIN SCALES - GENERAL: PAINLEVEL: NO PAIN (0)

## 2018-10-03 NOTE — PROGRESS NOTES
[facility]  [address] [facility] [address]  [phone]  Dept: [phone]    PRE-OP EVALUATION:  Today's date: 10/3/2018    Marlin Harman (: 1955) presents for pre-operative evaluation assessment as requested by Dr. Rendon.  She requires evaluation and anesthesia risk assessment prior to undergoing surgery/procedure for treatment of Gallstones .    Fax number for surgical facility: [phone]  Primary Physician: [facility]  Type of Anesthesia Anticipated: General    Patient has a Health Care Directive or Living Will:  YES on file    Preop Questions 10/3/2018   Who is doing your surgery? [facility] Dr Rendon   What are you having done? Laparoscopic Cholecystectomy Possible Open   Date of Surgery/Procedure: 10\10\2018   Facility or Hospital where procedure/surgery will be performed: [facility]   1.  Do you have a history of Heart attack, stroke, stent, coronary bypass surgery, or other heart surgery? No   2.  Do you ever have any pain or discomfort in your chest? No   3.  Do you have a history of  Heart Failure? No   4.   Are you troubled by shortness of breath when:  walking on a level surface, or up a slight hill, or at night? YES - occ. Up a slight hill, asthma ans chronic smoker, no chest pain   5.  Do you currently have a cold, bronchitis or other respiratory infection? No   6.  Do you have a cough, shortness of breath, or wheezing? YES - has asthma and a smoker   7.  Do you sometimes get pains in the calves of your legs when you walk? No   8. Do you or anyone in your family have previous history of blood clots? No   9.  Do you or does anyone in your family have a serious bleeding problem such as prolonged bleeding following surgeries or cuts? No   10. Have you ever had problems with anemia or been told to take iron pills? No   11. Have you had any abnormal blood loss such as black, tarry or bloody stools, or abnormal vaginal bleeding? No   12. Have  you ever had a blood transfusion? No   13. Have you or any of your relatives ever had problems with anesthesia? YES - personal, congestion in lungs after surgery   14. Do you have sleep apnea, excessive snoring or daytime drowsiness? YES - sleep apnea, uses c-pap   15. Do you have any prosthetic heart valves? No   16. Do you have prosthetic joints? No   17. Is there any chance that you may be pregnant? No         HPI:     HPI related to upcoming procedure:   62-year-old female presents for a preop physical exam.  Patient is scheduled to have laparoscopic cholecystectomy on October 10, 2018.  Past medical history significant for multiple comorbidities including obstructive sleep apnea, hypertension, morbid obesity, tobacco abuse and chronic seasonal allergic rhinitis.  Patient denies any chest pain or palpitation, shortness of breath, cough, bowel/bladder or other relevant systemic symptoms.      MEDICAL HISTORY:     Patient Active Problem List    Diagnosis Date Noted     Obstruction of bile duct 08/21/2018     Priority: Medium     Abnormal results of liver function studies 08/03/2018     Priority: Medium     Scleral icterus 08/03/2018     Priority: Medium     Chronic seasonal allergic rhinitis due to pollen 08/02/2018     Priority: Medium     Morbid obesity with BMI of 40.0-44.9, adult (H) 08/02/2018     Priority: Medium     Benign essential hypertension 08/02/2018     Priority: Medium     Tobacco use 08/02/2018     Priority: Medium     Pap smear of cervix declined 08/02/2018     Priority: Medium     Colonoscopy refused 08/02/2018     Priority: Medium     Mammogram declined 08/02/2018     Priority: Medium      History reviewed. No pertinent past medical history.  Past Surgical History:   Procedure Laterality Date     ENDOSCOPIC RETROGRADE CHOLANGIOPANCREATOGRAM N/A 8/22/2018    Procedure: COMBINED ENDOSCOPIC RETROGRADE CHOLANGIOPANCREATOGRAPHY, PLACE TUBE/STENT;  Endoscopic Retrograde Cholangiopancreatogram with  spinchterotomy, bile duct and pancreatic ducts stents placement;  Surgeon: Manny Cooper MD;  Location: [facility] OR     ENDOSCOPIC RETROGRADE CHOLANGIOPANCREATOGRAM N/A 9/13/2018    Procedure: ENDOSCOPIC RETROGRADE CHOLANGIOPANCREATOGRAM;  Endoscopic Retrograde Cholangiopancreatogram, with Stent Removal, Spyglass Cholangioscopy with Electro Carlisle Liptotripsy;  Surgeon: Manny Cooper MD;  Location: [facility] OR     Current Outpatient Prescriptions   Medication Sig Dispense Refill     aspirin 81 MG chewable tablet Take 81 mg by mouth daily       Cranberry 250 MG CAPS        cyclobenzaprine (FLEXERIL) 10 MG tablet Take 1 tablet (10 mg) by mouth At Bedtime 4 tablet 0     diphenhydrAMINE (BENADRYL) 25 MG tablet Take 25 mg by mouth nightly as needed for itching or allergies       folic acid (FOLVITE) 1 MG tablet Take 1 mg by mouth daily       Glucosamine Sulfate 500 MG TABS Take 1 tablet by mouth daily       loratadine (CLARITIN) 10 MG tablet Take 10 mg by mouth daily       vitamin B complex with vitamin C (VITAMIN  B COMPLEX) TABS tablet Take 1 tablet by mouth daily       vitamin E 400 UNIT capsule Take 400 Units by mouth daily       hydrochlorothiazide (HYDRODIURIL) 25 MG tablet Take 1 tablet (25 mg) by mouth daily for 14 days 14 tablet 0     OTC products: None, except as noted above    Allergies   Allergen Reactions     Citrus Dermatitis     Latex      Mold      Seafood Nausea and Vomiting     Scallops only, all other seafood ok     Small Pox Vaccine      Sulfa Drugs       Latex Allergy: YES: Precautions to take: skin breaks opens when using latex    Social History   Substance Use Topics     Smoking status: Light Tobacco Smoker     Types: Cigarettes     Smokeless tobacco: Never Used      Comment: 4-6 cigs/day, smoked since age 18, at most 2 packs per day     Alcohol use Yes      Comment: rare      History   Drug Use No       REVIEW OF SYSTEMS:   Constitutional, neuro, ENT, endocrine, pulmonary, cardiac, gastrointestinal,  genitourinary, musculoskeletal, integument and psychiatric systems are negative, except as otherwise noted.    EXAM:   /85  Pulse 86  Temp 97.6  F (36.4  C) (Tympanic)  Resp 16  Wt 239 lb (108.4 kg)  SpO2 98%  BMI 42.34 kg/m2    GENERAL APPEARANCE: alert and active     EYES: EOMI, PERRL     HENT: ear canals and TM's normal and nose and mouth without ulcers or lesions     NECK: no adenopathy, no asymmetry, masses, or scars and thyroid normal to palpation     RESP: lungs clear to auscultation - no rales, rhonchi or wheezes     CV: regular rates and rhythm, normal S1 S2, no S3 or S4 and no murmur, click or rub     ABDOMEN:  soft, nontender, no HSM or masses and bowel sounds normal     MS: extremities normal- no gross deformities noted, no evidence of inflammation in joints, FROM in all extremities.     SKIN: no suspicious lesions or rashes     NEURO: Normal strength and tone, sensory exam grossly normal, mentation intact and speech normal     PSYCH: mentation appears normal. and affect normal/bright     LYMPHATICS: No cervical adenopathy    DIAGNOSTICS:   EC2018: normal sinus rhythm    Recent Labs   Lab Test  18   0833  18   0722  18   1838   18   0910  18   1213   HGB  13.6  11.3*   --    < >  12.5   --    PLT  269  250   --    < >  376   --    INR   --    --   1.04   --   1.00   --    NA  140  138   --    < >   --   135   POTASSIUM  3.6  3.6   --    < >   --   3.0*   CR  0.54  0.62   --    < >   --   0.50*   A1C   --    --    --    --    --   5.6    < > = values in this interval not displayed.      CHEST TWO VIEWS   10/3/2018 9:05 AM     HISTORY:  Undergoing lap cholecystectomy. Choledocholithiasis.     COMPARISON:  2018.     FINDINGS:  The heart size is normal. No mediastinal pathology is seen.  The lungs are clear. The pulmonary vasculature is normal. No  pneumothorax or pleural effusion is seen. I see no definite change  since the previous examination.           IMPRESSION:  Unremarkable chest.     IMPRESSION:   Reason for surgery/procedure: Choledocholithiasis/laparoscopic cholecystectomy  Diagnosis/reason for consult: Preop    The proposed surgical procedure is considered INTERMEDIATE risk.    REVISED CARDIAC RISK INDEX  The patient has the following serious cardiovascular risks for perioperative complications such as (MI, PE, VFib and 3  AV Block):  No serious cardiac risks  INTERPRETATION: 0 risks: Class I (very low risk - 0.4% complication rate)    The patient has the following additional risks for perioperative complications:    ICD-10-CM    1. Preop general physical exam Z01.818    2. BRITT (obstructive sleep apnea) G47.33    3. Morbid obesity with BMI of 40.0-44.9, adult (H) E66.01     Z68.41    4. Benign essential hypertension I10 hydrochlorothiazide (HYDRODIURIL) 25 MG tablet     hydrochlorothiazide (HYDRODIURIL) 25 MG tablet   5. Tobacco use Z72.0     somkes 4 packs of cigarette/week   6. Choledocholithiasis K80.50 XR Chest 2 Views   7. Chronic seasonal allergic rhinitis due to pollen J30.1 albuterol (PROAIR HFA/PROVENTIL HFA/VENTOLIN HFA) 108 (90 Base) MCG/ACT inhaler         RECOMMENDATIONS:     Smoking cessation counseling provided, not ready to quit currently.  Albuterol inhaler prescribed for seasonal allergies/undocumented asthma.  Reminded to use CPAP nightly while in the hospital.      Anticoagulant or Antiplatelet Medication Use  ASPIRIN: Discontinue ASA 5 days prior to procedure to reduce bleeding risk.  It should be resumed post-operatively.        APPROVAL GIVEN to proceed with proposed procedure, without further diagnostic evaluation       Signed Electronically by: Zechariah Castro MD    Copy of this evaluation report is provided to requesting physician.    [facility] Preop Guidelines    Revised Cardiac Risk Index

## 2018-10-03 NOTE — NURSING NOTE
"Chief Complaint   Patient presents with     Pre-Op Exam       Initial /84  Pulse 86  Temp 97.6  F (36.4  C) (Tympanic)  Resp 16  Wt 239 lb (108.4 kg)  SpO2 98%  BMI 42.34 kg/m2 Estimated body mass index is 42.34 kg/(m^2) as calculated from the following:    Height as of 9/13/18: 5' 3\" (1.6 m).    Weight as of this encounter: 239 lb (108.4 kg).    Patient presents to the clinic using No DME    Health Maintenance that is potentially due pending provider review:  Mammogram, Pap Smear and Colonoscopy/FIT    Gave pt phone number/pended order to schedule mammo and/or colonoscopy(or FIT)    Is there anyone who you would like to be able to receive your results? No  If yes have patient fill out LEONA      "

## 2018-10-03 NOTE — MR AVS SNAPSHOT
After Visit Summary   10/3/2018    Marlin Harman    MRN: [id number]           Patient Information     Date Of Birth          1955        Visit Information        Provider Department      10/3/2018 8:20 AM Zechariah Castro MD [facility]        Today's Diagnoses     Preop general physical exam    -  1    BRITT (obstructive sleep apnea)        Morbid obesity with BMI of 40.0-44.9, adult (H)        Benign essential hypertension        Tobacco use        Choledocholithiasis        Chronic seasonal allergic rhinitis due to pollen          Care Instructions      Before Your Surgery      Call your surgeon if there is any change in your health. This includes signs of a cold or flu (such as a sore throat, runny nose, cough, rash or fever).    Do not smoke, drink alcohol or take over the counter medicine (unless your surgeon or primary care doctor tells you to) for the 24 hours before and after surgery.    If you take prescribed drugs: Follow your doctor s orders about which medicines to take and which to stop until after surgery.    Eating and drinking prior to surgery: follow the instructions from your surgeon    Take a shower or bath the night before surgery. Use the soap your surgeon gave you to gently clean your skin. If you do not have soap from your surgeon, use your regular soap. Do not shave or scrub the surgery site.  Wear clean pajamas and have clean sheets on your bed.           Follow-ups after your visit        Your next 10 appointments already scheduled     Oct 10, 2018   Procedure with Alex Rendon MD   [facility], [place], Same Day Surgery (--)    [address] [facility] [address]   [phone]              Future tests that were ordered for you today     Open Future Orders        Priority Expected Expires Ordered    XR Chest 2 Views STAT 10/3/2018 10/3/2019 10/3/2018            Who to contact     If you have questions or need follow up information about  today's clinic visit or your schedule please contact [facility] directly at [phone].  Normal or non-critical lab and imaging results will be communicated to you by MyChart, letter or phone within 4 business days after the clinic has received the results. If you do not hear from us within 7 days, please contact the clinic through MyChart or phone. If you have a critical or abnormal lab result, we will notify you by phone as soon as possible.  Submit refill requests through [employer] or call your pharmacy and they will forward the refill request to us. Please allow 3 business days for your refill to be completed.          Additional Information About Your Visit        Care EveryWhere ID     This is your Care EveryWhere ID. This could be used by other organizations to access your [place] medical records  [id number]        Your Vitals Were     Pulse Temperature Respirations Pulse Oximetry BMI (Body Mass Index)       86 97.6  F (36.4  C) (Tympanic) 16 98% 42.34 kg/m2        Blood Pressure from Last 3 Encounters:   10/03/18 135/85   09/26/18 152/73   09/13/18 163/75    Weight from Last 3 Encounters:   10/03/18 239 lb (108.4 kg)   09/26/18 239 lb 8 oz (108.6 kg)   09/13/18 238 lb 1.6 oz (108 kg)                 Today's Medication Changes          These changes are accurate as of 10/3/18  8:50 AM.  If you have any questions, ask your nurse or doctor.               Start taking these medicines.        Dose/Directions    albuterol 108 (90 Base) MCG/ACT inhaler   Commonly known as:  PROAIR HFA/PROVENTIL HFA/VENTOLIN HFA   Used for:  Chronic seasonal allergic rhinitis due to pollen   Started by:  Zechariah Castro MD        Dose:  2 puff   Inhale 2 puffs into the lungs every 6 hours as needed for shortness of breath / dyspnea or wheezing   Quantity:  1 Inhaler   Refills:  1       * hydrochlorothiazide 25 MG tablet   Commonly known as:  HYDRODIURIL   Used for:  Benign essential hypertension   Started by:   Zechariah Castro MD        Dose:  25 mg   Take 1 tablet (25 mg) by mouth daily   Quantity:  14 tablet   Refills:  0       * hydrochlorothiazide 25 MG tablet   Commonly known as:  HYDRODIURIL   Used for:  Benign essential hypertension   Started by:  Zechariah Castro MD        Dose:  25 mg   Take 1 tablet (25 mg) by mouth daily   Quantity:  90 tablet   Refills:  1       * Notice:  This list has 2 medication(s) that are the same as other medications prescribed for you. Read the directions carefully, and ask your doctor or other care provider to review them with you.         Where to get your medicines      These medications were sent to [facility] [facility] [address]  [address] [facility] [address]     Phone:  [phone]     albuterol 108 (90 Base) MCG/ACT inhaler    hydrochlorothiazide 25 MG tablet    hydrochlorothiazide 25 MG tablet                Primary Care Provider Office Phone # Fax #    [facility] [phone]       [address] [facility] [address]        Equal Access to Services     TIARA MARX AH: Hadii ciera ku hadasho Soomaali, waaxda luqadaha, qaybta kaalmada adeegyada, waxcasandra weaver haydelbert bland . So [facility] [phone].    ATENCIÓN: Si habla español, tiene a lopez disposición servicios gratuitos de asistencia lingüística. Llame al [phone].    We comply with applicable federal civil rights laws and [place] laws. We do not discriminate on the basis of race, color, national origin, age, disability, sex, sexual orientation, or gender identity.            Thank you!     Thank you for choosing [facility]  for your care. Our goal is always to provide you with excellent care. Hearing back from our patients is one way we can continue to improve our services. Please take a few minutes to complete the written survey that you may receive in the mail after your visit with us. Thank you!             Your Updated  Medication List - Protect others around you: Learn how to safely use, store and throw away your medicines at www.disposemymeds.org.          This list is accurate as of 10/3/18  8:50 AM.  Always use your most recent med list.                   Brand Name Dispense Instructions for use Diagnosis    albuterol 108 (90 Base) MCG/ACT inhaler    PROAIR HFA/PROVENTIL HFA/VENTOLIN HFA    1 Inhaler    Inhale 2 puffs into the lungs every 6 hours as needed for shortness of breath / dyspnea or wheezing    Chronic seasonal allergic rhinitis due to pollen       aspirin 81 MG chewable tablet      Take 81 mg by mouth daily        Cranberry 250 MG Caps           cyclobenzaprine 10 MG tablet    FLEXERIL    4 tablet    Take 1 tablet (10 mg) by mouth At Bedtime    Obstruction of bile duct       diphenhydrAMINE 25 MG tablet    BENADRYL     Take 25 mg by mouth nightly as needed for itching or allergies        folic acid 1 MG tablet    FOLVITE     Take 1 mg by mouth daily        Glucosamine Sulfate 500 MG Tabs      Take 1 tablet by mouth daily        * hydrochlorothiazide 25 MG tablet    HYDRODIURIL    14 tablet    Take 1 tablet (25 mg) by mouth daily    Benign essential hypertension       * hydrochlorothiazide 25 MG tablet    HYDRODIURIL    90 tablet    Take 1 tablet (25 mg) by mouth daily    Benign essential hypertension       loratadine 10 MG tablet    CLARITIN     Take 10 mg by mouth daily        vitamin B complex with vitamin C Tabs tablet      Take 1 tablet by mouth daily        vitamin E 400 UNIT capsule      Take 400 Units by mouth daily        * Notice:  This list has 2 medication(s) that are the same as other medications prescribed for you. Read the directions carefully, and ask your doctor or other care provider to review them with you.

## 2018-10-09 ENCOUNTER — ANESTHESIA EVENT (OUTPATIENT)
Dept: SURGERY | Facility: CLINIC | Age: 63
DRG: 415 | End: 2018-10-09
Payer: COMMERCIAL

## 2018-10-09 NOTE — ANESTHESIA PREPROCEDURE EVALUATION
"  Anesthesia Evaluation     . Pt has had prior anesthetic. Type: General    History of anesthetic complications    states she had \"lung congestion\" after previous anesthesia      ROS/MED HX    ENT/Pulmonary:     (+)asthma Treatment: Inhaler prn,  , . .    Neurologic:  - neg neurologic ROS     Cardiovascular:     (+) hypertension----. : . . . :. . Previous cardiac testing date:results:date: results:ECG reviewed date:9/18 results:NSR date: results:          METS/Exercise Tolerance:     Hematologic:  - neg hematologic  ROS       Musculoskeletal:  - neg musculoskeletal ROS       GI/Hepatic:  - neg GI/hepatic ROS       Renal/Genitourinary:  - ROS Renal section negative       Endo:     (+) Obesity, .      Psychiatric:  - neg psychiatric ROS       Infectious Disease:  - neg infectious disease ROS       Malignancy:      - no malignancy   Other:    (+) no H/O Chronic Pain,                   Physical Exam  Normal systems: cardiovascular, pulmonary and dental    Airway   Mallampati: II  TM distance: >3 FB  Neck ROM: full    Dental     Cardiovascular       Pulmonary                     Anesthesia Plan      History & Physical Review  History and physical reviewed and following examination; no interval change.    ASA Status:  2 .    NPO Status:  > 8 hours    Plan for General, ETT and Periph. Nerve Block for postop pain with Intravenous induction. Maintenance will be Balanced.    PONV prophylaxis:  Ondansetron (or other 5HT-3) and Dexamethasone or Solumedrol  Additional equipment: Videolaryngoscope     ASSESSMENT & PLAN:  - ASA 2  - GETA with standard ASA monitors, IV induction, and balanced anesthetic  - PIV  - Antibiotics per surgery  - PONV prophylaxis  - Blood products available, possible administration discussed with patient  - Relevant risks, benefits, alternatives and the anesthetic plan was discussed.  All questions were answered and there was agreement to proceed.    Kimberly Wells CA-1          Postoperative " Care  Postoperative pain management:  IV analgesics and Peripheral nerve block (Single Shot).      Consents  Anesthetic plan, risks, benefits and alternatives discussed with:  Patient..          Agree with anesthetic plan as outlined above    Praneeth Coats MD  Staff Anesthesiologist  [phone]        Procedure: Procedure(s):  Laparoscopic Cholecystectomy Possible Open, Anesthesia Block **Latex Allergy** - Wound Class: II-Clean Contaminated   - Wound Class: I-Clean    HPI: Marlin Harman is a 62 year old female scheduled for lap mirian.  Scheduled for pre-op TAP block for post op pain control.    PMHx/PSHx:  Past Medical History:   Diagnosis Date     Complication of anesthesia     patient reports lung congestion after surgery     Hypertension      Uncomplicated asthma        Past Surgical History:   Procedure Laterality Date     ENDOSCOPIC RETROGRADE CHOLANGIOPANCREATOGRAM N/A 8/22/2018    Procedure: COMBINED ENDOSCOPIC RETROGRADE CHOLANGIOPANCREATOGRAPHY, PLACE TUBE/STENT;  Endoscopic Retrograde Cholangiopancreatogram with spinchterotomy, bile duct and pancreatic ducts stents placement;  Surgeon: Manny Cooper MD;  Location: [facility] OR     ENDOSCOPIC RETROGRADE CHOLANGIOPANCREATOGRAM N/A 9/13/2018    Procedure: ENDOSCOPIC RETROGRADE CHOLANGIOPANCREATOGRAM;  Endoscopic Retrograde Cholangiopancreatogram, with Stent Removal, Spyglass Cholangioscopy with Electro Steele Liptotripsy;  Surgeon: Manny Cooper MD;  Location: [facility]         No current facility-administered medications on file prior to encounter.   Current Outpatient Prescriptions on File Prior to Encounter:  Cranberry 250 MG CAPS Take by mouth daily    cyclobenzaprine (FLEXERIL) 10 MG tablet Take 1 tablet (10 mg) by mouth At Bedtime   diphenhydrAMINE (BENADRYL) 25 MG tablet Take 25 mg by mouth nightly as needed for itching or allergies   folic acid (FOLVITE) 1 MG tablet Take 1 mg by mouth daily   Glucosamine Sulfate 500 MG TABS Take 1 tablet by mouth daily    loratadine (CLARITIN) 10 MG tablet Take 10 mg by mouth daily   vitamin B complex with vitamin C (VITAMIN  B COMPLEX) TABS tablet Take 1 tablet by mouth daily   vitamin E 400 UNIT capsule Take 400 Units by mouth daily   aspirin 81 MG chewable tablet Take 81 mg by mouth daily       Social Hx:   Social History   Substance Use Topics     Smoking status: Light Tobacco Smoker     Types: Cigarettes     Smokeless tobacco: Never Used      Comment: 4-6 cigs/day, smoked since age 18, at most 2 packs per day     Alcohol use Yes      Comment: rare        Allergies:   Allergies   Allergen Reactions     Ciprofloxacin      body stiffness      Citrus Dermatitis     Levaquin [Levofloxacin]      body stiffness      Mold      Seafood Nausea and Vomiting     Scallops only, all other seafood ok     Small Pox Vaccine      Sulfa Drugs      Latex Itching and Rash         NPO Status: Per ASA Guidelines    Labs:    Blood Bank:  No results found for: ABO, RH, AS  BMP:  Recent Labs   Lab Test  10/10/18   0604   NA  139   POTASSIUM  3.6   CHLORIDE  105   CO2  25   BUN  24   CR  0.56   GLC  114*   KEILY  9.4     CBC:   Recent Labs   Lab Test  09/13/18   0833   WBC  8.7   RBC  4.27   HGB  13.6   HCT  41.4   MCV  97   MCH  31.9   MCHC  32.9   RDW  16.3*   PLT  269     Coags:  Recent Labs   Lab Test  08/22/18   1838  08/07/18   0910   INR  1.04  1.00   PTT   --   30

## 2018-10-10 ENCOUNTER — ANESTHESIA (OUTPATIENT)
Dept: SURGERY | Facility: CLINIC | Age: 63
DRG: 415 | End: 2018-10-10
Payer: COMMERCIAL

## 2018-10-10 ENCOUNTER — APPOINTMENT (OUTPATIENT)
Dept: GENERAL RADIOLOGY | Facility: CLINIC | Age: 63
DRG: 415 | End: 2018-10-10
Attending: ANESTHESIOLOGY
Payer: COMMERCIAL

## 2018-10-10 ENCOUNTER — APPOINTMENT (OUTPATIENT)
Dept: GENERAL RADIOLOGY | Facility: CLINIC | Age: 63
DRG: 415 | End: 2018-10-10
Attending: SURGERY
Payer: COMMERCIAL

## 2018-10-10 ENCOUNTER — APPOINTMENT (OUTPATIENT)
Dept: GENERAL RADIOLOGY | Facility: CLINIC | Age: 63
DRG: 415 | End: 2018-10-10
Attending: INTERNAL MEDICINE
Payer: COMMERCIAL

## 2018-10-10 ENCOUNTER — HOSPITAL ENCOUNTER (INPATIENT)
Facility: CLINIC | Age: 63
LOS: 6 days | Discharge: HOME | DRG: 415 | End: 2018-10-16
Attending: SURGERY | Admitting: SURGERY
Payer: COMMERCIAL

## 2018-10-10 DIAGNOSIS — S36.509A INJURY OF COLON, INITIAL ENCOUNTER: ICD-10-CM

## 2018-10-10 DIAGNOSIS — K83.1 OBSTRUCTION OF BILE DUCT (H): ICD-10-CM

## 2018-10-10 DIAGNOSIS — Z90.49 S/P CHOLECYSTECTOMY: Primary | ICD-10-CM

## 2018-10-10 LAB
ALBUMIN SERPL-MCNC: 3.2 G/DL (ref 3.4–5)
ALP SERPL-CCNC: 119 U/L (ref 40–150)
ALT SERPL W P-5'-P-CCNC: 45 U/L (ref 0–50)
ANION GAP SERPL CALCULATED.3IONS-SCNC: 9 MMOL/L (ref 3–14)
AST SERPL W P-5'-P-CCNC: 16 U/L (ref 0–45)
BILIRUB SERPL-MCNC: 0.6 MG/DL (ref 0.2–1.3)
BUN SERPL-MCNC: 24 MG/DL (ref 7–30)
CALCIUM SERPL-MCNC: 9.4 MG/DL (ref 8.5–10.1)
CHLORIDE SERPL-SCNC: 105 MMOL/L (ref 94–109)
CO2 SERPL-SCNC: 25 MMOL/L (ref 20–32)
CREAT SERPL-MCNC: 0.56 MG/DL (ref 0.52–1.04)
CREAT SERPL-MCNC: 0.67 MG/DL (ref 0.52–1.04)
ERCP: NORMAL
GFR SERPL CREATININE-BSD FRML MDRD: 88 ML/MIN/1.7M2
GFR SERPL CREATININE-BSD FRML MDRD: >90 ML/MIN/1.7M2
GLUCOSE BLDC GLUCOMTR-MCNC: 128 MG/DL (ref 70–99)
GLUCOSE SERPL-MCNC: 114 MG/DL (ref 70–99)
PLATELET # BLD AUTO: 275 10E9/L (ref 150–450)
POTASSIUM SERPL-SCNC: 3.6 MMOL/L (ref 3.4–5.3)
PROT SERPL-MCNC: 7.2 G/DL (ref 6.8–8.8)
SODIUM SERPL-SCNC: 139 MMOL/L (ref 133–144)

## 2018-10-10 PROCEDURE — 88300 SURGICAL PATH GROSS: CPT | Performed by: SURGERY

## 2018-10-10 PROCEDURE — 71045 X-RAY EXAM CHEST 1 VIEW: CPT

## 2018-10-10 PROCEDURE — 25000128 H RX IP 250 OP 636: Performed by: ANESTHESIOLOGY

## 2018-10-10 PROCEDURE — 36000061 ZZH SURGERY LEVEL 3 W FLUORO 1ST 30 MIN - UMMC: Performed by: SURGERY

## 2018-10-10 PROCEDURE — 88304 TISSUE EXAM BY PATHOLOGIST: CPT | Performed by: SURGERY

## 2018-10-10 PROCEDURE — 40000277 XR SURGERY CARM FLUORO LESS THAN 5 MIN W STILLS: Mod: TC

## 2018-10-10 PROCEDURE — 25000566 ZZH SEVOFLURANE, EA 15 MIN: Performed by: SURGERY

## 2018-10-10 PROCEDURE — 80053 COMPREHEN METABOLIC PANEL: CPT | Performed by: SURGERY

## 2018-10-10 PROCEDURE — 25000128 H RX IP 250 OP 636: Performed by: STUDENT IN AN ORGANIZED HEALTH CARE EDUCATION/TRAINING PROGRAM

## 2018-10-10 PROCEDURE — 12000008 ZZH R&B INTERMEDIATE UMMC

## 2018-10-10 PROCEDURE — 25000132 ZZH RX MED GY IP 250 OP 250 PS 637: Performed by: STUDENT IN AN ORGANIZED HEALTH CARE EDUCATION/TRAINING PROGRAM

## 2018-10-10 PROCEDURE — 0F798DZ DILATION OF COMMON BILE DUCT WITH INTRALUMINAL DEVICE, VIA NATURAL OR ARTIFICIAL OPENING ENDOSCOPIC: ICD-10-PCS | Performed by: INTERNAL MEDICINE

## 2018-10-10 PROCEDURE — 0FT40ZZ RESECTION OF GALLBLADDER, OPEN APPROACH: ICD-10-PCS | Performed by: SURGERY

## 2018-10-10 PROCEDURE — 37000009 ZZH ANESTHESIA TECHNICAL FEE, EACH ADDTL 15 MIN: Performed by: SURGERY

## 2018-10-10 PROCEDURE — 25000128 H RX IP 250 OP 636: Performed by: SURGERY

## 2018-10-10 PROCEDURE — 40000170 ZZH STATISTIC PRE-PROCEDURE ASSESSMENT II: Performed by: SURGERY

## 2018-10-10 PROCEDURE — 71000014 ZZH RECOVERY PHASE 1 LEVEL 2 FIRST HR: Performed by: SURGERY

## 2018-10-10 PROCEDURE — 36415 COLL VENOUS BLD VENIPUNCTURE: CPT | Performed by: SURGERY

## 2018-10-10 PROCEDURE — 36000059 ZZH SURGERY LEVEL 3 EA 15 ADDTL MIN UMMC: Performed by: SURGERY

## 2018-10-10 PROCEDURE — C1877 STENT, NON-COAT/COV W/O DEL: HCPCS | Performed by: SURGERY

## 2018-10-10 PROCEDURE — 88305 TISSUE EXAM BY PATHOLOGIST: CPT | Performed by: SURGERY

## 2018-10-10 PROCEDURE — 85049 AUTOMATED PLATELET COUNT: CPT | Performed by: SURGERY

## 2018-10-10 PROCEDURE — 0FJ44ZZ INSPECTION OF GALLBLADDER, PERCUTANEOUS ENDOSCOPIC APPROACH: ICD-10-PCS | Performed by: SURGERY

## 2018-10-10 PROCEDURE — 71000015 ZZH RECOVERY PHASE 1 LEVEL 2 EA ADDTL HR: Performed by: SURGERY

## 2018-10-10 PROCEDURE — 27210794 ZZH OR GENERAL SUPPLY STERILE: Performed by: SURGERY

## 2018-10-10 PROCEDURE — 00000146 ZZHCL STATISTIC GLUCOSE BY METER IP

## 2018-10-10 PROCEDURE — 37000008 ZZH ANESTHESIA TECHNICAL FEE, 1ST 30 MIN: Performed by: SURGERY

## 2018-10-10 PROCEDURE — 25000125 ZZHC RX 250: Performed by: STUDENT IN AN ORGANIZED HEALTH CARE EDUCATION/TRAINING PROGRAM

## 2018-10-10 PROCEDURE — C1769 GUIDE WIRE: HCPCS | Performed by: SURGERY

## 2018-10-10 PROCEDURE — C9290 INJ, BUPIVACAINE LIPOSOME: HCPCS | Performed by: STUDENT IN AN ORGANIZED HEALTH CARE EDUCATION/TRAINING PROGRAM

## 2018-10-10 PROCEDURE — 82565 ASSAY OF CREATININE: CPT | Performed by: SURGERY

## 2018-10-10 DEVICE — IMPLANTABLE DEVICE
Type: IMPLANTABLE DEVICE | Site: BILE DUCT | Status: NON-FUNCTIONAL
Removed: 2018-11-29

## 2018-10-10 RX ORDER — NALOXONE HYDROCHLORIDE 0.4 MG/ML
.1-.4 INJECTION, SOLUTION INTRAMUSCULAR; INTRAVENOUS; SUBCUTANEOUS
Status: ACTIVE | OUTPATIENT
Start: 2018-10-10 | End: 2018-10-11

## 2018-10-10 RX ORDER — CYCLOBENZAPRINE HCL 10 MG
10 TABLET ORAL AT BEDTIME
Status: DISCONTINUED | OUTPATIENT
Start: 2018-10-10 | End: 2018-10-13 | Stop reason: ALTCHOICE

## 2018-10-10 RX ORDER — NALOXONE HYDROCHLORIDE 0.4 MG/ML
.1-.4 INJECTION, SOLUTION INTRAMUSCULAR; INTRAVENOUS; SUBCUTANEOUS
Status: DISCONTINUED | OUTPATIENT
Start: 2018-10-10 | End: 2018-10-10

## 2018-10-10 RX ORDER — AMOXICILLIN 250 MG
1 CAPSULE ORAL 2 TIMES DAILY
Status: DISCONTINUED | OUTPATIENT
Start: 2018-10-10 | End: 2018-10-13 | Stop reason: ALTCHOICE

## 2018-10-10 RX ORDER — FLUMAZENIL 0.1 MG/ML
0.2 INJECTION, SOLUTION INTRAVENOUS
Status: DISCONTINUED | OUTPATIENT
Start: 2018-10-10 | End: 2018-10-10 | Stop reason: HOSPADM

## 2018-10-10 RX ORDER — SODIUM CHLORIDE, SODIUM LACTATE, POTASSIUM CHLORIDE, CALCIUM CHLORIDE 600; 310; 30; 20 MG/100ML; MG/100ML; MG/100ML; MG/100ML
INJECTION, SOLUTION INTRAVENOUS CONTINUOUS
Status: DISCONTINUED | OUTPATIENT
Start: 2018-10-10 | End: 2018-10-10 | Stop reason: HOSPADM

## 2018-10-10 RX ORDER — HYDRALAZINE HYDROCHLORIDE 20 MG/ML
INJECTION INTRAMUSCULAR; INTRAVENOUS PRN
Status: DISCONTINUED | OUTPATIENT
Start: 2018-10-10 | End: 2018-10-10

## 2018-10-10 RX ORDER — CEFAZOLIN SODIUM 1 G/3ML
1 INJECTION, POWDER, FOR SOLUTION INTRAMUSCULAR; INTRAVENOUS SEE ADMIN INSTRUCTIONS
Status: DISCONTINUED | OUTPATIENT
Start: 2018-10-10 | End: 2018-10-10 | Stop reason: HOSPADM

## 2018-10-10 RX ORDER — LIDOCAINE 40 MG/G
CREAM TOPICAL
Status: DISCONTINUED | OUTPATIENT
Start: 2018-10-10 | End: 2018-10-16 | Stop reason: HOSPADM

## 2018-10-10 RX ORDER — ALBUTEROL SULFATE 90 UG/1
2 AEROSOL, METERED RESPIRATORY (INHALATION) EVERY 6 HOURS PRN
Status: DISCONTINUED | OUTPATIENT
Start: 2018-10-10 | End: 2018-10-16 | Stop reason: HOSPADM

## 2018-10-10 RX ORDER — LORATADINE 10 MG/1
10 TABLET ORAL DAILY
Status: DISCONTINUED | OUTPATIENT
Start: 2018-10-11 | End: 2018-10-13 | Stop reason: ALTCHOICE

## 2018-10-10 RX ORDER — SODIUM CHLORIDE, SODIUM LACTATE, POTASSIUM CHLORIDE, CALCIUM CHLORIDE 600; 310; 30; 20 MG/100ML; MG/100ML; MG/100ML; MG/100ML
INJECTION, SOLUTION INTRAVENOUS CONTINUOUS
Status: DISCONTINUED | OUTPATIENT
Start: 2018-10-10 | End: 2018-10-11

## 2018-10-10 RX ORDER — FENTANYL CITRATE 50 UG/ML
25-50 INJECTION, SOLUTION INTRAMUSCULAR; INTRAVENOUS
Status: DISCONTINUED | OUTPATIENT
Start: 2018-10-10 | End: 2018-10-10 | Stop reason: HOSPADM

## 2018-10-10 RX ORDER — ONDANSETRON 4 MG/1
4 TABLET, ORALLY DISINTEGRATING ORAL EVERY 6 HOURS PRN
Status: DISCONTINUED | OUTPATIENT
Start: 2018-10-10 | End: 2018-10-16 | Stop reason: HOSPADM

## 2018-10-10 RX ORDER — LIDOCAINE 40 MG/G
CREAM TOPICAL
Status: DISCONTINUED | OUTPATIENT
Start: 2018-10-10 | End: 2018-10-10 | Stop reason: HOSPADM

## 2018-10-10 RX ORDER — ONDANSETRON 2 MG/ML
4 INJECTION INTRAMUSCULAR; INTRAVENOUS EVERY 6 HOURS PRN
Status: DISCONTINUED | OUTPATIENT
Start: 2018-10-10 | End: 2018-10-16 | Stop reason: HOSPADM

## 2018-10-10 RX ORDER — PROCHLORPERAZINE MALEATE 5 MG
10 TABLET ORAL EVERY 6 HOURS PRN
Status: DISCONTINUED | OUTPATIENT
Start: 2018-10-10 | End: 2018-10-16 | Stop reason: HOSPADM

## 2018-10-10 RX ORDER — PROPOFOL 10 MG/ML
INJECTION, EMULSION INTRAVENOUS PRN
Status: DISCONTINUED | OUTPATIENT
Start: 2018-10-10 | End: 2018-10-10

## 2018-10-10 RX ORDER — AMOXICILLIN 250 MG
2 CAPSULE ORAL 2 TIMES DAILY
Status: DISCONTINUED | OUTPATIENT
Start: 2018-10-10 | End: 2018-10-13 | Stop reason: ALTCHOICE

## 2018-10-10 RX ORDER — BUPIVACAINE HYDROCHLORIDE 2.5 MG/ML
INJECTION, SOLUTION EPIDURAL; INFILTRATION; INTRACAUDAL PRN
Status: DISCONTINUED | OUTPATIENT
Start: 2018-10-10 | End: 2018-10-10

## 2018-10-10 RX ORDER — SODIUM CHLORIDE, SODIUM LACTATE, POTASSIUM CHLORIDE, CALCIUM CHLORIDE 600; 310; 30; 20 MG/100ML; MG/100ML; MG/100ML; MG/100ML
INJECTION, SOLUTION INTRAVENOUS CONTINUOUS PRN
Status: DISCONTINUED | OUTPATIENT
Start: 2018-10-10 | End: 2018-10-10

## 2018-10-10 RX ORDER — NALOXONE HYDROCHLORIDE 0.4 MG/ML
.1-.4 INJECTION, SOLUTION INTRAMUSCULAR; INTRAVENOUS; SUBCUTANEOUS
Status: DISCONTINUED | OUTPATIENT
Start: 2018-10-10 | End: 2018-10-10 | Stop reason: HOSPADM

## 2018-10-10 RX ORDER — DEXAMETHASONE SODIUM PHOSPHATE 4 MG/ML
INJECTION, SOLUTION INTRA-ARTICULAR; INTRALESIONAL; INTRAMUSCULAR; INTRAVENOUS; SOFT TISSUE PRN
Status: DISCONTINUED | OUTPATIENT
Start: 2018-10-10 | End: 2018-10-10

## 2018-10-10 RX ORDER — CEFAZOLIN SODIUM 2 G/100ML
2 INJECTION, SOLUTION INTRAVENOUS
Status: COMPLETED | OUTPATIENT
Start: 2018-10-10 | End: 2018-10-10

## 2018-10-10 RX ORDER — ONDANSETRON 4 MG/1
4 TABLET, ORALLY DISINTEGRATING ORAL EVERY 30 MIN PRN
Status: DISCONTINUED | OUTPATIENT
Start: 2018-10-10 | End: 2018-10-10 | Stop reason: HOSPADM

## 2018-10-10 RX ORDER — ONDANSETRON 2 MG/ML
INJECTION INTRAMUSCULAR; INTRAVENOUS PRN
Status: DISCONTINUED | OUTPATIENT
Start: 2018-10-10 | End: 2018-10-10

## 2018-10-10 RX ORDER — FLUMAZENIL 0.1 MG/ML
0.2 INJECTION, SOLUTION INTRAVENOUS
Status: ACTIVE | OUTPATIENT
Start: 2018-10-10 | End: 2018-10-11

## 2018-10-10 RX ORDER — NALOXONE HYDROCHLORIDE 0.4 MG/ML
.1-.4 INJECTION, SOLUTION INTRAMUSCULAR; INTRAVENOUS; SUBCUTANEOUS
Status: DISCONTINUED | OUTPATIENT
Start: 2018-10-11 | End: 2018-10-16 | Stop reason: HOSPADM

## 2018-10-10 RX ORDER — FUROSEMIDE 10 MG/ML
10 INJECTION INTRAMUSCULAR; INTRAVENOUS ONCE
Status: COMPLETED | OUTPATIENT
Start: 2018-10-10 | End: 2018-10-10

## 2018-10-10 RX ORDER — DIPHENHYDRAMINE HCL 25 MG
25 CAPSULE ORAL
Status: DISCONTINUED | OUTPATIENT
Start: 2018-10-10 | End: 2018-10-16 | Stop reason: HOSPADM

## 2018-10-10 RX ORDER — ONDANSETRON 2 MG/ML
4 INJECTION INTRAMUSCULAR; INTRAVENOUS EVERY 30 MIN PRN
Status: DISCONTINUED | OUTPATIENT
Start: 2018-10-10 | End: 2018-10-10 | Stop reason: HOSPADM

## 2018-10-10 RX ORDER — CYCLOBENZAPRINE HCL 10 MG
10 TABLET ORAL AT BEDTIME
Status: DISCONTINUED | OUTPATIENT
Start: 2018-10-10 | End: 2018-10-10

## 2018-10-10 RX ORDER — KETAMINE HYDROCHLORIDE 10 MG/ML
INJECTION, SOLUTION INTRAMUSCULAR; INTRAVENOUS PRN
Status: DISCONTINUED | OUTPATIENT
Start: 2018-10-10 | End: 2018-10-10

## 2018-10-10 RX ORDER — LIDOCAINE HYDROCHLORIDE 20 MG/ML
INJECTION, SOLUTION INFILTRATION; PERINEURAL PRN
Status: DISCONTINUED | OUTPATIENT
Start: 2018-10-10 | End: 2018-10-10

## 2018-10-10 RX ORDER — HYDROCHLOROTHIAZIDE 25 MG/1
25 TABLET ORAL DAILY
Status: DISCONTINUED | OUTPATIENT
Start: 2018-10-10 | End: 2018-10-13 | Stop reason: ALTCHOICE

## 2018-10-10 RX ORDER — ACETAMINOPHEN 325 MG/1
975 TABLET ORAL EVERY 8 HOURS
Status: DISCONTINUED | OUTPATIENT
Start: 2018-10-10 | End: 2018-10-13 | Stop reason: ALTCHOICE

## 2018-10-10 RX ORDER — FENTANYL CITRATE 50 UG/ML
INJECTION, SOLUTION INTRAMUSCULAR; INTRAVENOUS PRN
Status: DISCONTINUED | OUTPATIENT
Start: 2018-10-10 | End: 2018-10-10

## 2018-10-10 RX ADMIN — FENTANYL CITRATE 50 MCG: 50 INJECTION, SOLUTION INTRAMUSCULAR; INTRAVENOUS at 08:20

## 2018-10-10 RX ADMIN — FUROSEMIDE 10 MG: 10 INJECTION, SOLUTION INTRAVENOUS at 15:42

## 2018-10-10 RX ADMIN — HYDRALAZINE HYDROCHLORIDE 5 MG: 20 INJECTION INTRAMUSCULAR; INTRAVENOUS at 08:41

## 2018-10-10 RX ADMIN — ROCURONIUM BROMIDE 10 MG: 10 INJECTION INTRAVENOUS at 08:54

## 2018-10-10 RX ADMIN — MIDAZOLAM 1 MG: 1 INJECTION INTRAMUSCULAR; INTRAVENOUS at 07:44

## 2018-10-10 RX ADMIN — CYCLOBENZAPRINE HYDROCHLORIDE 10 MG: 10 TABLET, FILM COATED ORAL at 18:30

## 2018-10-10 RX ADMIN — SODIUM CHLORIDE, POTASSIUM CHLORIDE, SODIUM LACTATE AND CALCIUM CHLORIDE: 600; 310; 30; 20 INJECTION, SOLUTION INTRAVENOUS at 07:35

## 2018-10-10 RX ADMIN — ROCURONIUM BROMIDE 10 MG: 10 INJECTION INTRAVENOUS at 10:37

## 2018-10-10 RX ADMIN — ROCURONIUM BROMIDE 10 MG: 10 INJECTION INTRAVENOUS at 08:50

## 2018-10-10 RX ADMIN — Medication: at 14:47

## 2018-10-10 RX ADMIN — FENTANYL CITRATE 25 MCG: 50 INJECTION, SOLUTION INTRAMUSCULAR; INTRAVENOUS at 16:01

## 2018-10-10 RX ADMIN — FENTANYL CITRATE 50 MCG: 50 INJECTION, SOLUTION INTRAMUSCULAR; INTRAVENOUS at 11:17

## 2018-10-10 RX ADMIN — ONDANSETRON 4 MG: 2 INJECTION INTRAMUSCULAR; INTRAVENOUS at 14:12

## 2018-10-10 RX ADMIN — CEFAZOLIN SODIUM 2 G: 2 INJECTION, SOLUTION INTRAVENOUS at 07:54

## 2018-10-10 RX ADMIN — Medication 30 MG: at 12:05

## 2018-10-10 RX ADMIN — ROCURONIUM BROMIDE 10 MG: 10 INJECTION INTRAVENOUS at 11:15

## 2018-10-10 RX ADMIN — MIDAZOLAM HYDROCHLORIDE 1 MG: 2 INJECTION, SOLUTION INTRAMUSCULAR; INTRAVENOUS at 07:20

## 2018-10-10 RX ADMIN — ALBUTEROL SULFATE 2 PUFF: 90 AEROSOL, METERED RESPIRATORY (INHALATION) at 20:27

## 2018-10-10 RX ADMIN — PROPOFOL 150 MG: 10 INJECTION, EMULSION INTRAVENOUS at 07:44

## 2018-10-10 RX ADMIN — HYDROMORPHONE HYDROCHLORIDE 0.25 MG: 1 INJECTION, SOLUTION INTRAMUSCULAR; INTRAVENOUS; SUBCUTANEOUS at 11:36

## 2018-10-10 RX ADMIN — LIDOCAINE HYDROCHLORIDE 50 MG: 20 INJECTION, SOLUTION INFILTRATION; PERINEURAL at 07:44

## 2018-10-10 RX ADMIN — ROCURONIUM BROMIDE 50 MG: 10 INJECTION INTRAVENOUS at 07:44

## 2018-10-10 RX ADMIN — FENTANYL CITRATE 25 MCG: 50 INJECTION, SOLUTION INTRAMUSCULAR; INTRAVENOUS at 15:00

## 2018-10-10 RX ADMIN — SODIUM CHLORIDE, POTASSIUM CHLORIDE, SODIUM LACTATE AND CALCIUM CHLORIDE: 600; 310; 30; 20 INJECTION, SOLUTION INTRAVENOUS at 14:49

## 2018-10-10 RX ADMIN — FENTANYL CITRATE 50 MCG: 50 INJECTION, SOLUTION INTRAMUSCULAR; INTRAVENOUS at 08:14

## 2018-10-10 RX ADMIN — FENTANYL CITRATE 50 MCG: 50 INJECTION, SOLUTION INTRAMUSCULAR; INTRAVENOUS at 07:20

## 2018-10-10 RX ADMIN — CEFAZOLIN SODIUM 1 G: 2 INJECTION, SOLUTION INTRAVENOUS at 09:52

## 2018-10-10 RX ADMIN — SENNOSIDES AND DOCUSATE SODIUM 1 TABLET: 8.6; 5 TABLET ORAL at 19:20

## 2018-10-10 RX ADMIN — BUPIVACAINE 20 ML: 13.3 INJECTION, SUSPENSION, LIPOSOMAL INFILTRATION at 07:21

## 2018-10-10 RX ADMIN — ROCURONIUM BROMIDE 10 MG: 10 INJECTION INTRAVENOUS at 12:29

## 2018-10-10 RX ADMIN — ROCURONIUM BROMIDE 10 MG: 10 INJECTION INTRAVENOUS at 11:59

## 2018-10-10 RX ADMIN — FENTANYL CITRATE 25 MCG: 50 INJECTION, SOLUTION INTRAMUSCULAR; INTRAVENOUS at 15:18

## 2018-10-10 RX ADMIN — ROCURONIUM BROMIDE 10 MG: 10 INJECTION INTRAVENOUS at 09:17

## 2018-10-10 RX ADMIN — ROCURONIUM BROMIDE 10 MG: 10 INJECTION INTRAVENOUS at 09:55

## 2018-10-10 RX ADMIN — HYDROCHLOROTHIAZIDE 25 MG: 25 TABLET ORAL at 20:00

## 2018-10-10 RX ADMIN — ROCURONIUM BROMIDE 10 MG: 10 INJECTION INTRAVENOUS at 13:01

## 2018-10-10 RX ADMIN — HYDROMORPHONE HYDROCHLORIDE 0.25 MG: 1 INJECTION, SOLUTION INTRAMUSCULAR; INTRAVENOUS; SUBCUTANEOUS at 11:30

## 2018-10-10 RX ADMIN — ROCURONIUM BROMIDE 10 MG: 10 INJECTION INTRAVENOUS at 13:58

## 2018-10-10 RX ADMIN — DEXAMETHASONE SODIUM PHOSPHATE 6 MG: 4 INJECTION, SOLUTION INTRA-ARTICULAR; INTRALESIONAL; INTRAMUSCULAR; INTRAVENOUS; SOFT TISSUE at 08:00

## 2018-10-10 RX ADMIN — HYDROMORPHONE HYDROCHLORIDE 0.25 MG: 1 INJECTION, SOLUTION INTRAMUSCULAR; INTRAVENOUS; SUBCUTANEOUS at 13:47

## 2018-10-10 RX ADMIN — FENTANYL CITRATE 25 MCG: 50 INJECTION, SOLUTION INTRAMUSCULAR; INTRAVENOUS at 16:11

## 2018-10-10 RX ADMIN — CEFAZOLIN SODIUM 1 G: 2 INJECTION, SOLUTION INTRAVENOUS at 14:05

## 2018-10-10 RX ADMIN — SUGAMMADEX 200 MG: 100 INJECTION, SOLUTION INTRAVENOUS at 14:12

## 2018-10-10 RX ADMIN — BUPIVACAINE HYDROCHLORIDE 20 ML: 2.5 INJECTION, SOLUTION EPIDURAL; INFILTRATION; INTRACAUDAL; PERINEURAL at 07:21

## 2018-10-10 RX ADMIN — HYDROMORPHONE HYDROCHLORIDE 0.5 MG: 1 INJECTION, SOLUTION INTRAMUSCULAR; INTRAVENOUS; SUBCUTANEOUS at 12:37

## 2018-10-10 RX ADMIN — CEFAZOLIN SODIUM 1 G: 2 INJECTION, SOLUTION INTRAVENOUS at 11:59

## 2018-10-10 RX ADMIN — HYDROMORPHONE HYDROCHLORIDE 0.25 MG: 1 INJECTION, SOLUTION INTRAMUSCULAR; INTRAVENOUS; SUBCUTANEOUS at 14:09

## 2018-10-10 RX ADMIN — FENTANYL CITRATE 50 MCG: 50 INJECTION, SOLUTION INTRAMUSCULAR; INTRAVENOUS at 07:44

## 2018-10-10 ASSESSMENT — ACTIVITIES OF DAILY LIVING (ADL)
TOILETING: 0-->INDEPENDENT
PRIOR_FUNCTIONAL_LEVEL_COMMENT: INDEPENDENT WITH ADL'S
TRANSFERRING: 1-->ASSISTIVE EQUIPMENT
ADLS_ACUITY_SCORE: 11
RETIRED_COMMUNICATION: 0-->UNDERSTANDS/COMMUNICATES WITHOUT DIFFICULTY
BATHING: 0-->INDEPENDENT
COGNITION: 0 - NO COGNITION ISSUES REPORTED
SWALLOWING: 0-->SWALLOWS FOODS/LIQUIDS WITHOUT DIFFICULTY
DRESS: 0-->INDEPENDENT
FALL_HISTORY_WITHIN_LAST_SIX_MONTHS: NO
RETIRED_EATING: 0-->INDEPENDENT
AMBULATION: 0-->INDEPENDENT

## 2018-10-10 ASSESSMENT — PAIN DESCRIPTION - DESCRIPTORS: DESCRIPTORS: ACHING;DISCOMFORT

## 2018-10-10 NOTE — ANESTHESIA POSTPROCEDURE EVALUATION
Patient: Marlin Harman    Procedure(s):  Attempted Laparoscopic Cholecystectomy Converted to Open Cholecystectomy, Intraoperative cholagiogram x2, repair of colon injury, endoscopic retrograde cholangiogram with biliary stent insertion. - Wound Class: II-Clean Contaminated   - Wound Class: II-Clean Contaminated   - Wound Class: II-Clean Contaminated    Diagnosis:Gallstones   Diagnosis Additional Information: No value filed.    Anesthesia Type:  General, ETT    Note:  Anesthesia Post Evaluation    Patient location during evaluation: PACU  Patient participation: Able to fully participate in evaluation  Level of consciousness: awake and alert  Pain management: adequate  Airway patency: patent  Cardiovascular status: acceptable  Respiratory status: acceptable  Hydration status: acceptable  PONV: none     Anesthetic complications: None    Comments: Patient had some wheezing and decreased SpO2 in PACU.  CXR done, wnl.  10mg furosemide given with improvement in saturation.        Last vitals:  Vitals:    10/10/18 1515 10/10/18 1530 10/10/18 1545   BP: 138/66 148/80 132/74   Resp: 20 20 22   Temp: 36.8  C (98.2  F) 36.9  C (98.4  F) 36.8  C (98.2  F)   SpO2: 90% 94% 93%         Electronically Signed By: Araceli Friedman MD  October 10, 2018  4:11 PM

## 2018-10-10 NOTE — BRIEF OP NOTE
[facility], [place]    Brief Operative Note    Pre-operative diagnosis: Gallstones   Post-operative diagnosis Same  Procedure: Procedure(s):  Laparoscopic Cholecystectomy Converted to Open Cholecystectomy - Wound Class: II-Clean Contaminated   - Wound Class: I-Clean   - Wound Class: II-Clean Contaminated   Intraoperative cholangiogram x2  Surgeon: Surgeon(s) and Role:     * Alex Rendon MD - Primary     * Guadalupe Corbett MD - Resident - Assisting   Param Khalil MD - Resident - Assisting  Anesthesia: Combined General with Block   Estimated blood loss: 50 ml  Drains: Judd-Stone to RUQ  Specimens:   ID Type Source Tests Collected by Time Destination   A : colonic injury Tissue Other SURGICAL PATHOLOGY EXAM Alex Rendon MD 10/10/2018  9:14 AM    B : stone from gallbladder Tissue Other SURGICAL PATHOLOGY EXAM Alex Rendon MD 10/10/2018  9:51 AM    C : gallbladder Tissue Other SURGICAL PATHOLOGY EXAM Alex Rendon MD 10/10/2018 12:07 PM      Findings:   Intensely adhesed and inflamed gallbladder.  Complications: Thermal injury to transverse colon, repaired w/ stapler. Also damage to right hepatic duct confirmed on IOC.  Implants: None.    --  Wilbert Khalil  General Surgery PGY2    Dictation #[id number]

## 2018-10-10 NOTE — IP AVS SNAPSHOT
MRN:[id number]                      After Visit Summary   10/10/2018    Marlin Harman    MRN: [id number]           Thank you!     Thank you for choosing [facility] for your care. Our goal is always to provide you with excellent care. Hearing back from our patients is one way we can continue to improve our services. Please take a few minutes to complete the written survey that you may receive in the mail after you visit with us. Thank you!        Patient Information     Date Of Birth          1955        Designated Caregiver       Most Recent Value    Caregiver    Will someone help with your care after discharge? yes    Name of designated caregiver Mary Jo Nelson    Phone number of caregiver     Caregiver address [facility] [place]      About your hospital stay     You were admitted on:  October 10, 2018 You last received care in the:  [address] [facility]    You were discharged on:  October 16, 2018        Reason for your hospital stay       Elective cholecystectomy                  Who to Call     For medical emergencies, please call 911.  For non-urgent questions about your medical care, please call your primary care provider or clinic, [phone]  For questions related to your surgery, please call your surgery clinic        Attending Provider     Provider Specialty    Alex Rendon MD General Surgery       Primary Care Provider Office Phone # Fax #    Zechariah Ur MD Matthew [phone]      After Care Instructions     Diet       Follow this diet upon discharge: Orders Placed This Encounter      Regular Diet Adult            Discharge Instructions       Please no lifting more than 20 lbs for 2 weeks. You will follow up with your surgeon in 1 week. If you develop any fevers (T>100.4) or new redness/swelling at your incision site then please go to your nearest emergency department or call  and ask for the general surgery resident on call. Do not  "soak your incisions. Washing with soap and water is okay. Your wounds have staples on them. These can be taken out by any healthcare professional in 1-2 weeks. Your prior drain site has a dressing that will fall of on its own. Then you can wash that site with soap and water but, again, do not soak it.                  Follow-up Appointments     Adult [facility] Follow-up and recommended labs and tests       Follow up with Dr. Rendon in 1 week , at (location with clinic name or city) [facility], within 1 week  to evaluate after surgery. The following labs/tests are recommended: Hepatic function panel and CBC.    Appointments on [place] and/or [facility] (with [facility] or [facility] provider or service). Call [phone] if you haven't heard regarding these appointments within 7 days of discharge.                  Your next 10 appointments already scheduled     Nov 29, 2018   Procedure with Manny Cooper MD   [facility], [place], Same Day Surgery (--)    [address] [facility] [address] [phone]              Additional Information     If you use hormonal birth control (such as the pill, patch, ring or implants): You'll need a second form of birth control for 7 days (condoms, a diaphragm or contraceptive foam). While in the hospital, you received a medicine called Bridion. Your normal birth control will not work as well for a week after taking this medicine.          Pending Results     No orders found from 10/8/2018 to 10/11/2018.            Admission Information     Date & Time Provider Department Dept. Phone    10/10/2018 Alex Rendon MD [address] [facility] [place] [phone]      Your Vitals Were     Blood Pressure Pulse Temperature Respirations Height Weight    147/74 (BP Location: Left arm) 72 98.4  F (36.9  C) (Oral) 18 1.63 m (5' 4.17\") 50.3 kg (110 lb 14.4 oz)    Pulse Oximetry BMI (Body Mass Index)                93% 18.93 kg/m2          Care EveryWhere ID     This is your Care EveryWhere ID. This " could be used by other organizations to access your [facility] medical records  [id number]        Equal Access to Services     TIARA MARX : Hadii ciera Camilo, wafilipe corrigan, qaybta kajulianavale chavezmirellavale, paulo pazgakeron alvarez. So [facility] [phone].    ATENCIÓN: Si habla español, tiene a lopez disposición servicios gratuitos de asistencia lingüística. Llame al [phone].    We comply with applicable federal civil rights laws and [place] laws. We do not discriminate on the basis of race, color, national origin, age, disability, sex, sexual orientation, or gender identity.               Review of your medicines      START taking        Dose / Directions    acetaminophen 325 MG tablet   Commonly known as:  TYLENOL        Dose:  650 mg   Take 2 tablets (650 mg) by mouth every 6 hours as needed for mild pain   Quantity:  100 tablet   Refills:  0         CONTINUE these medicines which have NOT CHANGED        Dose / Directions    albuterol 108 (90 Base) MCG/ACT inhaler   Commonly known as:  PROAIR HFA/PROVENTIL HFA/VENTOLIN HFA   Used for:  Chronic seasonal allergic rhinitis due to pollen        Dose:  2 puff   Inhale 2 puffs into the lungs every 6 hours as needed for shortness of breath / dyspnea or wheezing   Quantity:  1 Inhaler   Refills:  1       aspirin 81 MG chewable tablet        Dose:  81 mg   Take 81 mg by mouth daily   Refills:  0       Cranberry 250 MG Caps        Take by mouth daily   Refills:  0       cyclobenzaprine 10 MG tablet   Commonly known as:  FLEXERIL   Used for:  Obstruction of bile duct        Dose:  10 mg   Take 1 tablet (10 mg) by mouth At Bedtime   Quantity:  4 tablet   Refills:  0       diphenhydrAMINE 25 MG tablet   Commonly known as:  BENADRYL        Dose:  25 mg   Take 25 mg by mouth nightly as needed for itching or allergies   Refills:  0       folic acid 1 MG tablet   Commonly known as:  FOLVITE        Dose:  1 mg   Take 1 mg by mouth daily   Refills:  0        Glucosamine Sulfate 500 MG Tabs        Dose:  1 tablet   Take 1 tablet by mouth daily   Refills:  0       hydrochlorothiazide 25 MG tablet   Commonly known as:  HYDRODIURIL   Used for:  Benign essential hypertension        Dose:  25 mg   Take 1 tablet (25 mg) by mouth daily   Quantity:  14 tablet   Refills:  0       loratadine 10 MG tablet   Commonly known as:  CLARITIN        Dose:  10 mg   Take 10 mg by mouth daily   Refills:  0       vitamin B complex with vitamin C Tabs tablet        Dose:  1 tablet   Take 1 tablet by mouth daily   Refills:  0       vitamin E 400 UNIT capsule        Dose:  400 Units   Take 400 Units by mouth daily   Refills:  0            Where to get your medicines      These medications were sent to [facility] [place], [place] - [address]  [address] [address]     Phone:  [phone]     acetaminophen 325 MG tablet                Protect others around you: Learn how to safely use, store and throw away your medicines at www.disposemymeds.org.             Medication List: This is a list of all your medications and when to take them. Check marks below indicate your daily home schedule. Keep this list as a reference.      Medications           Morning Afternoon Evening Bedtime As Needed    acetaminophen 325 MG tablet   Commonly known as:  TYLENOL   Take 2 tablets (650 mg) by mouth every 6 hours as needed for mild pain   Last time this was given:  975 mg on 10/15/2018  4:00 PM                                albuterol 108 (90 Base) MCG/ACT inhaler   Commonly known as:  PROAIR HFA/PROVENTIL HFA/VENTOLIN HFA   Inhale 2 puffs into the lungs every 6 hours as needed for shortness of breath / dyspnea or wheezing   Last time this was given:  2 puffs on 10/10/2018  8:27 PM                                aspirin 81 MG chewable tablet   Take 81 mg by mouth daily                                Cranberry 250 MG Caps   Take by mouth daily                                 cyclobenzaprine 10 MG tablet   Commonly known as:  FLEXERIL   Take 1 tablet (10 mg) by mouth At Bedtime   Last time this was given:  10 mg on 10/15/2018  9:10 PM                                diphenhydrAMINE 25 MG tablet   Commonly known as:  BENADRYL   Take 25 mg by mouth nightly as needed for itching or allergies                                folic acid 1 MG tablet   Commonly known as:  FOLVITE   Take 1 mg by mouth daily                                Glucosamine Sulfate 500 MG Tabs   Take 1 tablet by mouth daily                                hydrochlorothiazide 25 MG tablet   Commonly known as:  HYDRODIURIL   Take 1 tablet (25 mg) by mouth daily   Last time this was given:  25 mg on 10/16/2018  8:16 AM                                loratadine 10 MG tablet   Commonly known as:  CLARITIN   Take 10 mg by mouth daily   Last time this was given:  10 mg on 10/16/2018  8:17 AM                                vitamin B complex with vitamin C Tabs tablet   Take 1 tablet by mouth daily                                vitamin E 400 UNIT capsule   Take 400 Units by mouth daily

## 2018-10-10 NOTE — ANESTHESIA PROCEDURE NOTES
Peripheral Nerve Block Procedure Note    Staff:     Anesthesiologist:  TAYLOR MCDONNELL    Resident/CRNA:  LANDRY REY    Block performed by resident/CRNA in the presence of a teaching physician    Location: Pre-op  Procedure Start/Stop TImes:      10/10/2018 7:15 AM     10/10/2018 7:22 AM    patient identified, IV checked, site marked, risks and benefits discussed, informed consent, monitors and equipment checked, pre-op evaluation, at physician/surgeon's request and post-op pain management      Correct Patient: Yes      Correct Position: Yes      Correct Site: Yes      Correct Procedure: Yes      Correct Laterality:  Yes    Site Marked:  Yes  Procedure details:     Procedure:  TAP (Sub costal)    ASA:  3    Diagnosis:  Post operative pain    Laterality:  Bilateral    Position:  Supine    Sterile Prep: chloraprep, mask and sterile gloves      Local skin infiltration:  Other    Needle:  Short bevel    Needle length (mm):  110    Ultrasound: Yes      Ultrasound used to identify targeted nerve, plexus, or vascular structure and placed a needle adjacent to it      Permanent Image entered into patiient's record      Abnormal pain on injection: No      Blood Aspirated: No      Paresthesias:  No    Bleeding at site: No      Bolus via:  Needle    Infusion Method:  Single Shot    Complications:  None  Assessment/Narrative:     Injection made incrementally with aspirations every (mL):  5

## 2018-10-10 NOTE — OR NURSING
Dr. Rodarte and Dr. Friedman stopped by bedside. CXR was reviewed. Lasix produced some results. UOP lightened. Sign-out will be completed by Dr. Friedman. IS sent with patient.

## 2018-10-10 NOTE — PROGRESS NOTES
Patient seen in preop.  No new issues.  Reviewed risk/benefits of surgery including bleeding, infection, damage to surrounding organs as well as other risks such as heart attack, pneumonia, blood clots etc.  Compared to risks of no surgery, surgery is overall the safer option.  Questions answered.  She is understadning of discussion elfego greeable to proceed.

## 2018-10-10 NOTE — ANESTHESIA CARE TRANSFER NOTE
Patient: Marlin Harman    Procedure(s):  Attempted Laparoscopic Cholecystectomy Converted to Open Cholecystectomy, Intraoperative cholagiogram x2, repair of colon injury - Wound Class: II-Clean Contaminated   - Wound Class: II-Clean Contaminated   - Wound Class: II-Clean Contaminated    Diagnosis: Gallstones   Diagnosis Additional Information: No value filed.    Anesthesia Type:   General, ETT     Note:  Airway :Face Mask  Patient transferred to:PACU  Handoff Report: Identifed the Patient, Identified the Reponsible Provider, Reviewed the pertinent medical history, Discussed the surgical course, Reviewed Intra-OP anesthesia mangement and issues during anesthesia, Set expectations for post-procedure period and Allowed opportunity for questions and acknowledgement of understanding      Vitals: (Last set prior to Anesthesia Care Transfer)    CRNA VITALS  10/10/2018 1352 - 10/10/2018 1429      10/10/2018             Resp Rate (set): 10                Electronically Signed By: Kimberly Wells MD  October 10, 2018  2:29 PM

## 2018-10-10 NOTE — OR NURSING
Pre-op bilat TAP block completed by Smith Cochran, assisted by Dr. Coats, pt tolerated well see VS and block FS and MAR.

## 2018-10-10 NOTE — IP AVS SNAPSHOT
[address] [address]    Phone:  [phone]                                       After Visit Summary   10/10/2018    Marlin Harman    MRN: [id number]           After Visit Summary Signature Page     I have received my discharge instructions, and my questions have been answered. I have discussed any challenges I see with this plan with the nurse or doctor.    ..........................................................................................................................................  Patient/Patient Representative Signature      ..........................................................................................................................................  Patient Representative Print Name and Relationship to Patient    ..................................................               ................................................  Date                                   Time    ..........................................................................................................................................  Reviewed by Signature/Title    ...................................................              ..............................................  Date                                               Time          22EPIC Rev 08/18

## 2018-10-10 NOTE — OR NURSING
Patient with fair cough. Fine crackles present. Expiratory wheezes present. Smoker. Uses CPAP at home. Non-oxygen dependent. Does not have home CPAP system here today. Saturations 89-93% on NC.  Dr. Coats at bedside. Will have following anesthesiologist address sign-out when appropriate. Started education with patient with Incentive Spirometer and TCDB as tolerated. Spoke with Dr. Friedman, CXR and Lasix 10 mg IV ordered and administered. Productive cough present. Splinting of abdomen done with coughing.

## 2018-10-11 LAB
ALBUMIN SERPL-MCNC: 3 G/DL (ref 3.4–5)
ALP SERPL-CCNC: 109 U/L (ref 40–150)
ALT SERPL W P-5'-P-CCNC: 114 U/L (ref 0–50)
ANION GAP SERPL CALCULATED.3IONS-SCNC: 12 MMOL/L (ref 3–14)
AST SERPL W P-5'-P-CCNC: 73 U/L (ref 0–45)
BASOPHILS # BLD AUTO: 0 10E9/L (ref 0–0.2)
BASOPHILS NFR BLD AUTO: 0.1 %
BILIRUB DIRECT SERPL-MCNC: 0.3 MG/DL (ref 0–0.2)
BILIRUB SERPL-MCNC: 0.7 MG/DL (ref 0.2–1.3)
BUN SERPL-MCNC: 17 MG/DL (ref 7–30)
CALCIUM SERPL-MCNC: 9.1 MG/DL (ref 8.5–10.1)
CHLORIDE SERPL-SCNC: 103 MMOL/L (ref 94–109)
CO2 SERPL-SCNC: 25 MMOL/L (ref 20–32)
CREAT SERPL-MCNC: 0.62 MG/DL (ref 0.52–1.04)
DIFFERENTIAL METHOD BLD: ABNORMAL
EOSINOPHIL # BLD AUTO: 0 10E9/L (ref 0–0.7)
EOSINOPHIL NFR BLD AUTO: 0.2 %
ERYTHROCYTE [DISTWIDTH] IN BLOOD BY AUTOMATED COUNT: 13.9 % (ref 10–15)
GFR SERPL CREATININE-BSD FRML MDRD: >90 ML/MIN/1.7M2
GLUCOSE SERPL-MCNC: 110 MG/DL (ref 70–99)
HCT VFR BLD AUTO: 45.4 % (ref 35–47)
HGB BLD-MCNC: 14.5 G/DL (ref 11.7–15.7)
IMM GRANULOCYTES # BLD: 0.1 10E9/L (ref 0–0.4)
IMM GRANULOCYTES NFR BLD: 0.3 %
INR PPP: 1.07 (ref 0.86–1.14)
LYMPHOCYTES # BLD AUTO: 2.7 10E9/L (ref 0.8–5.3)
LYMPHOCYTES NFR BLD AUTO: 14.3 %
MAGNESIUM SERPL-MCNC: 2 MG/DL (ref 1.6–2.3)
MCH RBC QN AUTO: 31.2 PG (ref 26.5–33)
MCHC RBC AUTO-ENTMCNC: 31.9 G/DL (ref 31.5–36.5)
MCV RBC AUTO: 98 FL (ref 78–100)
MONOCYTES # BLD AUTO: 1.4 10E9/L (ref 0–1.3)
MONOCYTES NFR BLD AUTO: 7.1 %
NEUTROPHILS # BLD AUTO: 14.8 10E9/L (ref 1.6–8.3)
NEUTROPHILS NFR BLD AUTO: 78 %
NRBC # BLD AUTO: 0 10*3/UL
NRBC BLD AUTO-RTO: 0 /100
PLATELET # BLD AUTO: 230 10E9/L (ref 150–450)
POTASSIUM SERPL-SCNC: 3.9 MMOL/L (ref 3.4–5.3)
PROT SERPL-MCNC: 7.2 G/DL (ref 6.8–8.8)
RBC # BLD AUTO: 4.65 10E12/L (ref 3.8–5.2)
SODIUM SERPL-SCNC: 140 MMOL/L (ref 133–144)
WBC # BLD AUTO: 19 10E9/L (ref 4–11)

## 2018-10-11 PROCEDURE — 25000128 H RX IP 250 OP 636: Performed by: STUDENT IN AN ORGANIZED HEALTH CARE EDUCATION/TRAINING PROGRAM

## 2018-10-11 PROCEDURE — 36415 COLL VENOUS BLD VENIPUNCTURE: CPT | Performed by: SURGERY

## 2018-10-11 PROCEDURE — 85610 PROTHROMBIN TIME: CPT | Performed by: SURGERY

## 2018-10-11 PROCEDURE — 80076 HEPATIC FUNCTION PANEL: CPT | Performed by: SURGERY

## 2018-10-11 PROCEDURE — 40000275 ZZH STATISTIC RCP TIME EA 10 MIN

## 2018-10-11 PROCEDURE — 80048 BASIC METABOLIC PNL TOTAL CA: CPT | Performed by: SURGERY

## 2018-10-11 PROCEDURE — 85025 COMPLETE CBC W/AUTO DIFF WBC: CPT | Performed by: SURGERY

## 2018-10-11 PROCEDURE — 25000132 ZZH RX MED GY IP 250 OP 250 PS 637: Performed by: STUDENT IN AN ORGANIZED HEALTH CARE EDUCATION/TRAINING PROGRAM

## 2018-10-11 PROCEDURE — 94660 CPAP INITIATION&MGMT: CPT

## 2018-10-11 PROCEDURE — 83735 ASSAY OF MAGNESIUM: CPT | Performed by: SURGERY

## 2018-10-11 PROCEDURE — 12000008 ZZH R&B INTERMEDIATE UMMC

## 2018-10-11 RX ORDER — HYDROMORPHONE HCL/0.9% NACL/PF 0.2MG/0.2
0.2 SYRINGE (ML) INTRAVENOUS
Status: DISCONTINUED | OUTPATIENT
Start: 2018-10-11 | End: 2018-10-15

## 2018-10-11 RX ORDER — OXYCODONE HYDROCHLORIDE 10 MG/1
10 TABLET ORAL EVERY 4 HOURS PRN
Status: DISCONTINUED | OUTPATIENT
Start: 2018-10-11 | End: 2018-10-13 | Stop reason: ALTCHOICE

## 2018-10-11 RX ORDER — CALCIUM CARBONATE 500 MG/1
500-1000 TABLET, CHEWABLE ORAL DAILY PRN
Status: DISCONTINUED | OUTPATIENT
Start: 2018-10-11 | End: 2018-10-13

## 2018-10-11 RX ADMIN — SENNOSIDES AND DOCUSATE SODIUM 2 TABLET: 8.6; 5 TABLET ORAL at 07:51

## 2018-10-11 RX ADMIN — SENNOSIDES AND DOCUSATE SODIUM 2 TABLET: 8.6; 5 TABLET ORAL at 20:23

## 2018-10-11 RX ADMIN — LORATADINE 10 MG: 10 TABLET ORAL at 07:51

## 2018-10-11 RX ADMIN — ACETAMINOPHEN 975 MG: 325 TABLET, FILM COATED ORAL at 20:23

## 2018-10-11 RX ADMIN — ACETAMINOPHEN 975 MG: 325 TABLET, FILM COATED ORAL at 09:10

## 2018-10-11 RX ADMIN — HYDROCHLOROTHIAZIDE 25 MG: 25 TABLET ORAL at 07:51

## 2018-10-11 RX ADMIN — CALCIUM CARBONATE (ANTACID) CHEW TAB 500 MG 1000 MG: 500 CHEW TAB at 17:38

## 2018-10-11 RX ADMIN — Medication 0.2 MG: at 15:32

## 2018-10-11 RX ADMIN — ENOXAPARIN SODIUM 40 MG: 40 INJECTION SUBCUTANEOUS at 07:48

## 2018-10-11 RX ADMIN — SODIUM CHLORIDE, POTASSIUM CHLORIDE, SODIUM LACTATE AND CALCIUM CHLORIDE: 600; 310; 30; 20 INJECTION, SOLUTION INTRAVENOUS at 07:48

## 2018-10-11 RX ADMIN — ACETAMINOPHEN 975 MG: 325 TABLET, FILM COATED ORAL at 07:50

## 2018-10-11 RX ADMIN — CYCLOBENZAPRINE HYDROCHLORIDE 10 MG: 10 TABLET, FILM COATED ORAL at 21:47

## 2018-10-11 RX ADMIN — OXYCODONE HYDROCHLORIDE 10 MG: 10 TABLET ORAL at 20:23

## 2018-10-11 ASSESSMENT — ACTIVITIES OF DAILY LIVING (ADL)
ADLS_ACUITY_SCORE: 12
ADLS_ACUITY_SCORE: 13
ADLS_ACUITY_SCORE: 12

## 2018-10-11 NOTE — PLAN OF CARE
"Problem: Patient Care Overview  Goal: Plan of Care/Patient Progress Review  Outcome: No Change  /84 (BP Location: Left arm)  Temp 97.2  F (36.2  C) (Axillary)  Resp 16  Ht 1.63 m (5' 4.17\")  Wt 50.3 kg (110 lb 14.4 oz)  SpO2 97%  BMI 18.93 kg/m2  Patient Afebrile, VSS. Patients pain controlled with  PCA Dilaudid set up at 0.2 mg q10 min. Pt used x1.O2 sats mid 90 on 3 L via NC. LS with crackles and expiratory wheezing in HENRIK.  Scheduled hydrochlorothiazide given. Tolerating clears. Denies nausea. Chung to gravity with 350 clear output. Primamore dressing x4 CDI and transverse incision with marked drainage, has not extended this shift. JOHN in RLQ intact with 10 ml serosanguinous drainage. LR infusing at 50 ml/hr. CPAP overnight.appears to rest between cares. Cont with POC.          "

## 2018-10-11 NOTE — OP NOTE
Procedure Date: 10/10/2018      PREOPERATIVE DIAGNOSIS:  Cholelithiasis and prior history of choledocholithiasis.      POSTOPERATIVE DIAGNOSIS:  Cholelithiasis and prior history of choledocholithiasis.      PROCEDURES:   1.  Laparoscopic, converted to open cholecystectomy.   2.  Repair of colonic injury.   3.  Intraoperative cholangiogram x2.   4.  Endoscopic retrograde cholangiopancreatography by Gastroenterology team.      SURGEON:  Alex Rendon MD      ASSISTANTS:   1.  Guadalupe Corbett MD, PGY-5   2.  Param Khalil MD, PGY-2.   3.  Zane Perez MS-3.      ANESTHESIA:  General endotracheal tube anesthesia and bilateral TAP blocks.      ESTIMATED BLOOD LOSS:  50 mL      INDICATIONS:  The patient is a 62-year-old female with a prior history of choledocholithiasis and hyperbilirubinemia with a prior history of an enlarged common bile duct with an obstructing gallstone.  She previously received an ERCP, and she presents today for elective cholecystectomy.  The risks and benefits and alternatives of procedure were discussed with her in clinic as well as in the preoperative area and the patient elected to proceed.      FINDINGS:  There was full thickness colonic injury to the transverse colon during the initial dissection of adhesions from the wall of the gallbladder.  There was also injury to a branch of the right hepatic duct.   Two intraoperative cholangiograms confirmed the presence of a leak from one of the branches of the right hepatic duct, and the second intraoperative cholangiogram demonstrated the presence of the cystic duct stump and no apparent injury to the common bile duct.      DESCRIPTION OF PROCEDURE:  The patient was brought to the operating room and placed in supine position.  After successful induction of general anesthesia, a footboard was placed.  Her abdomen was prepped and draped in the usual sterile fashion with chlorhexidine.  A timeout was called.  We began with a left upper  quadrant Veress needle entry.  The abdomen was then insufflated without difficulty with up to 15 mm of CO2.  We then placed additional ports, 2 subcostal ports in the right upper quadrant and 1 supraumbilical port.  We were able to visualize that there were significant adhesions to the fundus of the gallbladder.  We began using a combination of blunt and sharp dissection to take the omental adhesions off of the gallbladder.  During this process, we noted that there was a cautery injury to the transverse colon that was tightly adherent to the fundus of the gallbladder.  Therefore, given this colonic injury, the presence of a contracted and intrahepatic gallbladder with significant adhesions around the hepatocystic triangle, we elected to abort laparoscopy and convert it to an open cholecystectomy.      All previously place 5 mm ports were then removed under direct visualization.  We began by making a right subcostal incision approximately 2 fingerbreadths below the right costal margin.  We brought this incision down to the level of fascia.  The fascia was then grasped and incised sharply.  We then divided the rectus abdominis muscle in addition to the external oblique.  The posterior fascia was then grasped and the abdomen was entered sharply.  We then were able to palpate the gallbladder and felt that the fundus of the gallbladder was extremely firm.  We then placed a Bookwalter retractor system for better visualization.  We began by inspecting the transverse colon and searched for cautery injury to the antimesenteric border of the transverse colon.  We then noted that this cautery injury was a full thickness injury.  Therefore, we elected to staple off this area transversely relative to the taenia, using a 60 mm handheld BELLA stapler with a blue load.      We then turned our attention to continue the dissection of the gallbladder off of the gallbladder fossa using a dome down approach.  We attempted to grasp the  fundus of the gallbladder; however, we felt that there was a very firm fundus.  Therefore, in order to be able to grasp the gallbladder better, a cholecystotomy and saw that there was a very large stone approximately 1.5 cm in diameter.  We then extracted the stone through the incision into the gallbladder, and was able to grasp the wall of the gallbladder to begin our top down dissection.  The wall of the gallbladder was still very firm and inflamed and appeared to have been chronically inflamed.  We noted that there was a tubular structure that was dissected off of the hepatic wall of the gallbladder, and as we continue with our dissection further down toward the hepatocystic triangle, we noted that there was bile spillage from this tubular structure.  We then introduced a Taut catheter into this tubular structure and performed an intraoperative cholangiogram.  Using 100% Visipaque dye, we injected into the Taut catheter and saw that the intrahepatic ductal system, as well as the extrahepatic duct was visualized.  We then noted that the catheter was most likely intubating into a branch of the right hepatic duct.  At this point, we consulted the assistance and evaluation of our Gastroenterology colleagues, Dr. Amish Mckeon, and it was determined that there was likely a small branch of the right hepatic duct that had been transected and that after completion of the cholecystectomy, the Gastroenterology team would perform an ERCP and stent placement, as well as sphincterotomy to enable better flow down the extrahepatic common bile duct.        With this plan in place, we returned to the dissection of the gallbladder off of the gallbladder fossa and toward the hepatocystic triangle.  We noted 2 tubular structures, one appeared to have been the cystic artery.  This was then serially tied off proximally, distally with a 2-0 Vicryl tie and ligated sharply with scissors.  We then ligated the majority of the wall of the  gallbladder so that we could better visualize the infundibulum.  We then noted using a right angle that there was an opening at the base of the infundibulum on the mucosal side of the gallbladder which had spillage of bile.  We were then confident that this was likely the opening of the cystic duct.  With continued dissection down to isolate the cystic duct, we then elected to ligate the remainder of the infundibulum of the gallbladder off at the junction between the infundibulum and the cystic duct.  We then elected to perform a second intraoperative cholangiogram by intubating the cystic duct stump with a Taut catheter.  With the second cholangiogram, we noted that the catheter was intubated into the cystic duct and using a 100% Visipaque dye we were able to visualize the entirety of the extrahepatic duct and there was free flowing of dye into the duodenum and the intrahepatic ducts.  We were then, after the second cholangiogram, confident that there was no injury to the extrahepatic common bile duct.  The stump to the cystic duct was then oversewn with a 3-0 Vicryl in running fashion.  The previous stump that was identified as a branch of the right hepatic duct was then clipped.  We then placed a 19-Malay round Timmy drain in the subhepatic fossa and the drain was then secured to the skin with two 2-0 nylon stitches.  The laparoscopic incisions was then reapproximated with 4-0 Vicryl interrupted stitches.  The posterior sheath of the subcostal incision was then closed with a 0 Vicryl in running fashion and the anterior sheath was then closed with two #1 PDS sutures in a running fashion.  The incision was then irrigated and the skin was then reapproximated with staples.  Dressings were then applied.      At the end of the case, all sponge and needle counts were reported as correct.  Dr. Rendon was scrubbed for the entirety of the procedure.  The patient was then extubated and brought to the PACU in stable  condition.  There were no immediate complications.         ZULEIMA VASQUEZ MD       As dictated by CATARINA NETTLES MD     Attending attestation:  I was present for entirety of procedure up to and including closure of fascia.           D: 10/10/2018   T: 10/11/2018   MT: KAEL      Name:     NAGA BARAJAS   MRN:      [id number]        Account:        [id number]   :      1955           Procedure Date: 10/10/2018      Document: [id number]

## 2018-10-11 NOTE — PLAN OF CARE
"Problem: Patient Care Overview  Goal: Plan of Care/Patient Progress Review  /70 (BP Location: Left arm)  Temp 97.2  F (36.2  C) (Oral)  Resp 16  Ht 1.63 m (5' 4.17\")  Wt 50.3 kg (110 lb 14.4 oz)  SpO2 93%  BMI 18.93 kg/m2    AVSS. PCA discontinued this AM. Offered PRN pain medications and refused. JOHN putting out serosanguinous. Chung with AUOP. CPAP overnight and 3L NC while awake. PIV running LR 50/hr. Tolerating reg diet without complaints of GI distress. Passing minimal flatus. No BM for shift. Will continue to monitor.       "

## 2018-10-11 NOTE — PLAN OF CARE
Received patient from PACU around 4.45. A&Ox4. Minimal pain at rest. PCA Dilaudid set up at 0.2 mg q10 min. Pt used x1. Flexeril given x1 for back pain. O2 sats mid 90 on 3 L via NC. LS with crackles and expiratory wheezing in HENRIK. Albuterol given x1. Productive cough. Encouraged IS use. BP elevated in 150-160s. MD aware. Scheduled hydrochlorothiazide given. Tolerating clears. Denies nausea. Chung to gravity with 500 clear output. Primamore dressing x4 CDI and transverse incision with marked drainage, has not extended this shift. JOHN in RLQ intact with 30 ml serosanguinous drainage. Dangled and sat at edge of bed. LR infusing at 50 ml/hr. CPAP overnight.

## 2018-10-11 NOTE — PROGRESS NOTES
General Surgery Progress Note  October 11, 2018    S: Patient had some coughing and wheezing last night, had fluids decreased and received albuterol. WOre CPAP last night, breathing without difficulty this morning. Pain is controlled with pain meds, tolerating clear liquids without nausea. Endorses flatus, no BM yet.    O:    Temp:  [97.2  F (36.2  C)-98.4  F (36.9  C)] 97.2  F (36.2  C)  Heart Rate:  [78-91] 90  Resp:  [14-22] 16  BP: (130-181)/(59-95) 163/70  SpO2:  [85 %-100 %] 93 %      Intake/Output Summary (Last 24 hours) at 10/11/18 1014  Last data filed at 10/11/18 0500   Gross per 24 hour   Intake             1291 ml   Output             1815 ml   Net             -524 ml       Gen: NAD, lying comfortably in bed  Chest: nonlabored breathing  CV: RRR  Abdomen: soft, ATTP, mildly distended, incisions c/d/i. JOHN in RLQ with minimal serosanguinous drainage.  Extremities: without cyanosis or edema    10/11 Labs:  Lab Results   Component Value Date    WBC 19.0 10/11/2018     Lab Results   Component Value Date    RBC 4.65 10/11/2018     Lab Results   Component Value Date    HGB 14.5 10/11/2018     Lab Results   Component Value Date    HCT 45.4 10/11/2018     Lab Results   Component Value Date    MCV 98 10/11/2018     Lab Results   Component Value Date    MCH 31.2 10/11/2018     Lab Results   Component Value Date    MCHC 31.9 10/11/2018     Lab Results   Component Value Date    RDW 13.9 10/11/2018     Lab Results   Component Value Date     10/11/2018       Last Basic Metabolic Panel:  Lab Results   Component Value Date     10/11/2018      Lab Results   Component Value Date    POTASSIUM 3.9 10/11/2018     Lab Results   Component Value Date    CHLORIDE 103 10/11/2018     Lab Results   Component Value Date    KEILY 9.1 10/11/2018     Lab Results   Component Value Date    CO2 25 10/11/2018     Lab Results   Component Value Date    BUN 17 10/11/2018     Lab Results   Component Value Date    CR 0.62 10/11/2018      Lab Results   Component Value Date     10/11/2018         A/P: 61 yo F POD#1 s/p laparoscopic converted to open cholecystectomy for cholelithiasis and prior history of choledocholithiasis    Neuro: cont pain control with flexeril, as needed PO oxycodone with breakthrough dilaudid  Pulm: stable, cont encourage IS. CPAP at night for BRITT  FEN/GI: advance diet to regular as tolerated. Bowel regimen  Activity: up ad fabio  PPx: SCDs, lovenox    Seen with chief , discussed with staff Dr. Justice Walker DO

## 2018-10-12 ENCOUNTER — CARE COORDINATION (OUTPATIENT)
Dept: GASTROENTEROLOGY | Facility: CLINIC | Age: 63
End: 2018-10-12

## 2018-10-12 DIAGNOSIS — R94.5 ABNORMAL RESULTS OF LIVER FUNCTION STUDIES: Primary | ICD-10-CM

## 2018-10-12 DIAGNOSIS — Z90.49 S/P CHOLECYSTECTOMY: ICD-10-CM

## 2018-10-12 PROBLEM — S36.509A COLON INJURY: Status: ACTIVE | Noted: 2018-10-12

## 2018-10-12 LAB
ALBUMIN SERPL-MCNC: 3.2 G/DL (ref 3.4–5)
ALP SERPL-CCNC: 112 U/L (ref 40–150)
ALT SERPL W P-5'-P-CCNC: 90 U/L (ref 0–50)
ANION GAP SERPL CALCULATED.3IONS-SCNC: 9 MMOL/L (ref 3–14)
AST SERPL W P-5'-P-CCNC: 40 U/L (ref 0–45)
BASOPHILS # BLD AUTO: 0 10E9/L (ref 0–0.2)
BASOPHILS NFR BLD AUTO: 0.2 %
BILIRUB SERPL-MCNC: 1.2 MG/DL (ref 0.2–1.3)
BUN SERPL-MCNC: 15 MG/DL (ref 7–30)
CALCIUM SERPL-MCNC: 9.6 MG/DL (ref 8.5–10.1)
CHLORIDE SERPL-SCNC: 95 MMOL/L (ref 94–109)
CO2 SERPL-SCNC: 33 MMOL/L (ref 20–32)
CREAT SERPL-MCNC: 0.62 MG/DL (ref 0.52–1.04)
DIFFERENTIAL METHOD BLD: ABNORMAL
EOSINOPHIL # BLD AUTO: 0.1 10E9/L (ref 0–0.7)
EOSINOPHIL NFR BLD AUTO: 0.5 %
ERYTHROCYTE [DISTWIDTH] IN BLOOD BY AUTOMATED COUNT: 13.7 % (ref 10–15)
GFR SERPL CREATININE-BSD FRML MDRD: >90 ML/MIN/1.7M2
GLUCOSE SERPL-MCNC: 119 MG/DL (ref 70–99)
HCT VFR BLD AUTO: 47.8 % (ref 35–47)
HGB BLD-MCNC: 15.7 G/DL (ref 11.7–15.7)
IMM GRANULOCYTES # BLD: 0.1 10E9/L (ref 0–0.4)
IMM GRANULOCYTES NFR BLD: 0.3 %
LACTATE BLD-SCNC: 1.1 MMOL/L (ref 0.7–2)
LYMPHOCYTES # BLD AUTO: 2.8 10E9/L (ref 0.8–5.3)
LYMPHOCYTES NFR BLD AUTO: 16.2 %
MCH RBC QN AUTO: 31.8 PG (ref 26.5–33)
MCHC RBC AUTO-ENTMCNC: 32.8 G/DL (ref 31.5–36.5)
MCV RBC AUTO: 97 FL (ref 78–100)
MONOCYTES # BLD AUTO: 1 10E9/L (ref 0–1.3)
MONOCYTES NFR BLD AUTO: 6.1 %
NEUTROPHILS # BLD AUTO: 13.2 10E9/L (ref 1.6–8.3)
NEUTROPHILS NFR BLD AUTO: 76.7 %
NRBC # BLD AUTO: 0 10*3/UL
NRBC BLD AUTO-RTO: 0 /100
PLATELET # BLD AUTO: 288 10E9/L (ref 150–450)
POTASSIUM SERPL-SCNC: 4.1 MMOL/L (ref 3.4–5.3)
PROT SERPL-MCNC: 7.6 G/DL (ref 6.8–8.8)
RBC # BLD AUTO: 4.94 10E12/L (ref 3.8–5.2)
SODIUM SERPL-SCNC: 136 MMOL/L (ref 133–144)
WBC # BLD AUTO: 17.1 10E9/L (ref 4–11)

## 2018-10-12 PROCEDURE — 25000132 ZZH RX MED GY IP 250 OP 250 PS 637: Performed by: STUDENT IN AN ORGANIZED HEALTH CARE EDUCATION/TRAINING PROGRAM

## 2018-10-12 PROCEDURE — 12000008 ZZH R&B INTERMEDIATE UMMC

## 2018-10-12 PROCEDURE — 94660 CPAP INITIATION&MGMT: CPT

## 2018-10-12 PROCEDURE — 80053 COMPREHEN METABOLIC PANEL: CPT | Performed by: STUDENT IN AN ORGANIZED HEALTH CARE EDUCATION/TRAINING PROGRAM

## 2018-10-12 PROCEDURE — 25000128 H RX IP 250 OP 636: Performed by: STUDENT IN AN ORGANIZED HEALTH CARE EDUCATION/TRAINING PROGRAM

## 2018-10-12 PROCEDURE — 85025 COMPLETE CBC W/AUTO DIFF WBC: CPT | Performed by: STUDENT IN AN ORGANIZED HEALTH CARE EDUCATION/TRAINING PROGRAM

## 2018-10-12 PROCEDURE — 40000275 ZZH STATISTIC RCP TIME EA 10 MIN

## 2018-10-12 PROCEDURE — 83605 ASSAY OF LACTIC ACID: CPT | Performed by: SURGERY

## 2018-10-12 PROCEDURE — 36415 COLL VENOUS BLD VENIPUNCTURE: CPT | Performed by: STUDENT IN AN ORGANIZED HEALTH CARE EDUCATION/TRAINING PROGRAM

## 2018-10-12 PROCEDURE — 36415 COLL VENOUS BLD VENIPUNCTURE: CPT | Performed by: SURGERY

## 2018-10-12 PROCEDURE — 40000141 ZZH STATISTIC PERIPHERAL IV START W/O US GUIDANCE

## 2018-10-12 RX ORDER — PANTOPRAZOLE SODIUM 40 MG/1
40 TABLET, DELAYED RELEASE ORAL
Status: DISCONTINUED | OUTPATIENT
Start: 2018-10-12 | End: 2018-10-13 | Stop reason: ALTCHOICE

## 2018-10-12 RX ORDER — POLYETHYLENE GLYCOL 3350 17 G/17G
17 POWDER, FOR SOLUTION ORAL DAILY PRN
Status: DISCONTINUED | OUTPATIENT
Start: 2018-10-12 | End: 2018-10-13

## 2018-10-12 RX ORDER — PROMETHAZINE HYDROCHLORIDE 25 MG/ML
25 INJECTION, SOLUTION INTRAMUSCULAR; INTRAVENOUS ONCE
Status: COMPLETED | OUTPATIENT
Start: 2018-10-13 | End: 2018-10-13

## 2018-10-12 RX ADMIN — SENNOSIDES AND DOCUSATE SODIUM 2 TABLET: 8.6; 5 TABLET ORAL at 19:32

## 2018-10-12 RX ADMIN — ENOXAPARIN SODIUM 40 MG: 40 INJECTION SUBCUTANEOUS at 07:37

## 2018-10-12 RX ADMIN — PANTOPRAZOLE SODIUM 40 MG: 40 TABLET, DELAYED RELEASE ORAL at 16:09

## 2018-10-12 RX ADMIN — PROCHLORPERAZINE EDISYLATE 10 MG: 5 INJECTION INTRAMUSCULAR; INTRAVENOUS at 22:54

## 2018-10-12 RX ADMIN — HYDROCHLOROTHIAZIDE 25 MG: 25 TABLET ORAL at 07:37

## 2018-10-12 RX ADMIN — OXYCODONE HYDROCHLORIDE 10 MG: 10 TABLET ORAL at 19:32

## 2018-10-12 RX ADMIN — SENNOSIDES AND DOCUSATE SODIUM 2 TABLET: 8.6; 5 TABLET ORAL at 07:37

## 2018-10-12 RX ADMIN — CYCLOBENZAPRINE HYDROCHLORIDE 10 MG: 10 TABLET, FILM COATED ORAL at 21:50

## 2018-10-12 RX ADMIN — LORATADINE 10 MG: 10 TABLET ORAL at 07:37

## 2018-10-12 RX ADMIN — OXYCODONE HYDROCHLORIDE 10 MG: 10 TABLET ORAL at 08:01

## 2018-10-12 RX ADMIN — PROCHLORPERAZINE EDISYLATE 10 MG: 5 INJECTION INTRAMUSCULAR; INTRAVENOUS at 12:55

## 2018-10-12 RX ADMIN — PANTOPRAZOLE SODIUM 40 MG: 40 TABLET, DELAYED RELEASE ORAL at 07:37

## 2018-10-12 RX ADMIN — OXYCODONE HYDROCHLORIDE 10 MG: 10 TABLET ORAL at 04:09

## 2018-10-12 RX ADMIN — ONDANSETRON 4 MG: 2 INJECTION INTRAMUSCULAR; INTRAVENOUS at 19:32

## 2018-10-12 RX ADMIN — ONDANSETRON 4 MG: 2 INJECTION INTRAMUSCULAR; INTRAVENOUS at 08:47

## 2018-10-12 ASSESSMENT — ACTIVITIES OF DAILY LIVING (ADL)
ADLS_ACUITY_SCORE: 12

## 2018-10-12 NOTE — PLAN OF CARE
Problem: Patient Care Overview  Goal: Plan of Care/Patient Progress Review  Outcome: No Change  A&O x4. Tried to wean O2. Pt dropped to 88-89% on RA. 3L NC applied. CPAP at HS. Pt c/o abdominal pain. Pain managed with IV dilaudid x1 and oxycodone x1. Transverse abdominal incision dressing marked, no change. Lap sites, cdi.  JOHN with scant s/s output. Chung removed at 1530. Voided adequately x3. Faint BS. -F[place]us. Ambulated in halls x1 with SBA. Continue POC.

## 2018-10-12 NOTE — PLAN OF CARE
"Problem: Patient Care Overview  Goal: Plan of Care/Patient Progress Review  /86 (BP Location: Left arm)  Temp 96.7  F (35.9  C) (Oral)  Resp 16  Ht 1.63 m (5' 4.17\")  Wt 50.3 kg (110 lb 14.4 oz)  SpO2 90%  BMI 18.93 kg/m2    AVSS. Sats in 90s on RA. Abdominal incisions dressing removed and closed with steri strips and dermabond. Big abdominal incision still dressed CDI. JOHN with scant serosanguinous out. Pain managed with PRN oxycodone. AUOP into toilet. SBA with cane. Complaints of \"nausea and heartburn, it feels like a lava river in my throat\". Spontaneous emesis x1 with 400 out. Zofran given with relief. Nausea came back and compazine given with relief. Regular diet but no appetite. Has been sipping on apple juice and slowly eating saltine crackers for shift. Given aromatherapy sticks to experiment with non-pharmacologic relief as well. Will continue to monitor.      "

## 2018-10-12 NOTE — PLAN OF CARE
"Problem: Cholecystectomy (Adult)  Goal: Signs and Symptoms of Listed Potential Problems Will be Absent, Minimized or Managed (Cholecystectomy)  Signs and symptoms of listed potential problems will be absent, minimized or managed by discharge/transition of care (reference Cholecystectomy (Adult) CPG).  Outcome: Improving  AVSS.  O2 sats 90% on RA.  Abdomen soft/tender.  Pt taking liquids overnight without nausea.  Abdominal dressings CDI.  Abdominal JOHN with scant output.  Pt voiding spont (adequate amounts).  Pt took oxycodone 10mg x 1 overnight for abdominal pain with \"adequate\" relief.        "

## 2018-10-13 ENCOUNTER — APPOINTMENT (OUTPATIENT)
Dept: ULTRASOUND IMAGING | Facility: CLINIC | Age: 63
DRG: 415 | End: 2018-10-13
Attending: SURGERY
Payer: COMMERCIAL

## 2018-10-13 ENCOUNTER — APPOINTMENT (OUTPATIENT)
Dept: GENERAL RADIOLOGY | Facility: CLINIC | Age: 63
DRG: 415 | End: 2018-10-13
Attending: SURGERY
Payer: COMMERCIAL

## 2018-10-13 LAB
ALBUMIN SERPL-MCNC: 3.2 G/DL (ref 3.4–5)
ALP SERPL-CCNC: 123 U/L (ref 40–150)
ALT SERPL W P-5'-P-CCNC: 76 U/L (ref 0–50)
ANION GAP SERPL CALCULATED.3IONS-SCNC: 10 MMOL/L (ref 3–14)
AST SERPL W P-5'-P-CCNC: 32 U/L (ref 0–45)
BASOPHILS # BLD AUTO: 0 10E9/L (ref 0–0.2)
BASOPHILS NFR BLD AUTO: 0 %
BILIRUB SERPL-MCNC: 0.9 MG/DL (ref 0.2–1.3)
BUN SERPL-MCNC: 24 MG/DL (ref 7–30)
CALCIUM SERPL-MCNC: 10.2 MG/DL (ref 8.5–10.1)
CHLORIDE SERPL-SCNC: 90 MMOL/L (ref 94–109)
CO2 SERPL-SCNC: 32 MMOL/L (ref 20–32)
CREAT SERPL-MCNC: 0.79 MG/DL (ref 0.52–1.04)
DIFFERENTIAL METHOD BLD: ABNORMAL
EOSINOPHIL # BLD AUTO: 0 10E9/L (ref 0–0.7)
EOSINOPHIL NFR BLD AUTO: 0 %
ERYTHROCYTE [DISTWIDTH] IN BLOOD BY AUTOMATED COUNT: 13.7 % (ref 10–15)
GFR SERPL CREATININE-BSD FRML MDRD: 73 ML/MIN/1.7M2
GLUCOSE BLDC GLUCOMTR-MCNC: 162 MG/DL (ref 70–99)
GLUCOSE SERPL-MCNC: 132 MG/DL (ref 70–99)
HCT VFR BLD AUTO: 49.4 % (ref 35–47)
HGB BLD-MCNC: 16.5 G/DL (ref 11.7–15.7)
IMM GRANULOCYTES # BLD: 0.1 10E9/L (ref 0–0.4)
IMM GRANULOCYTES NFR BLD: 0.3 %
LYMPHOCYTES # BLD AUTO: 1.8 10E9/L (ref 0.8–5.3)
LYMPHOCYTES NFR BLD AUTO: 8.6 %
MCH RBC QN AUTO: 31.5 PG (ref 26.5–33)
MCHC RBC AUTO-ENTMCNC: 33.4 G/DL (ref 31.5–36.5)
MCV RBC AUTO: 95 FL (ref 78–100)
MONOCYTES # BLD AUTO: 1 10E9/L (ref 0–1.3)
MONOCYTES NFR BLD AUTO: 4.9 %
NEUTROPHILS # BLD AUTO: 17.4 10E9/L (ref 1.6–8.3)
NEUTROPHILS NFR BLD AUTO: 86.2 %
NRBC # BLD AUTO: 0 10*3/UL
NRBC BLD AUTO-RTO: 0 /100
PLATELET # BLD AUTO: 285 10E9/L (ref 150–450)
POTASSIUM SERPL-SCNC: 3.7 MMOL/L (ref 3.4–5.3)
PROT SERPL-MCNC: 8 G/DL (ref 6.8–8.8)
RBC # BLD AUTO: 5.23 10E12/L (ref 3.8–5.2)
SODIUM SERPL-SCNC: 132 MMOL/L (ref 133–144)
WBC # BLD AUTO: 20.2 10E9/L (ref 4–11)

## 2018-10-13 PROCEDURE — 12000008 ZZH R&B INTERMEDIATE UMMC

## 2018-10-13 PROCEDURE — 25800025 ZZH RX 258: Performed by: STUDENT IN AN ORGANIZED HEALTH CARE EDUCATION/TRAINING PROGRAM

## 2018-10-13 PROCEDURE — 25000132 ZZH RX MED GY IP 250 OP 250 PS 637: Performed by: STUDENT IN AN ORGANIZED HEALTH CARE EDUCATION/TRAINING PROGRAM

## 2018-10-13 PROCEDURE — 25000131 ZZH RX MED GY IP 250 OP 636 PS 637: Performed by: STUDENT IN AN ORGANIZED HEALTH CARE EDUCATION/TRAINING PROGRAM

## 2018-10-13 PROCEDURE — 00000146 ZZHCL STATISTIC GLUCOSE BY METER IP

## 2018-10-13 PROCEDURE — 85025 COMPLETE CBC W/AUTO DIFF WBC: CPT | Performed by: STUDENT IN AN ORGANIZED HEALTH CARE EDUCATION/TRAINING PROGRAM

## 2018-10-13 PROCEDURE — 25000128 H RX IP 250 OP 636: Performed by: STUDENT IN AN ORGANIZED HEALTH CARE EDUCATION/TRAINING PROGRAM

## 2018-10-13 PROCEDURE — 36415 COLL VENOUS BLD VENIPUNCTURE: CPT | Performed by: STUDENT IN AN ORGANIZED HEALTH CARE EDUCATION/TRAINING PROGRAM

## 2018-10-13 PROCEDURE — 40000275 ZZH STATISTIC RCP TIME EA 10 MIN

## 2018-10-13 PROCEDURE — 40000986 XR ABDOMEN PORT 1 VW

## 2018-10-13 PROCEDURE — 80053 COMPREHEN METABOLIC PANEL: CPT | Performed by: STUDENT IN AN ORGANIZED HEALTH CARE EDUCATION/TRAINING PROGRAM

## 2018-10-13 PROCEDURE — 40000141 ZZH STATISTIC PERIPHERAL IV START W/O US GUIDANCE

## 2018-10-13 PROCEDURE — 25000132 ZZH RX MED GY IP 250 OP 250 PS 637: Performed by: SURGERY

## 2018-10-13 PROCEDURE — 74018 RADEX ABDOMEN 1 VIEW: CPT

## 2018-10-13 PROCEDURE — 76700 US EXAM ABDOM COMPLETE: CPT

## 2018-10-13 RX ORDER — DEXTROSE MONOHYDRATE, SODIUM CHLORIDE, AND POTASSIUM CHLORIDE 50; 1.49; 4.5 G/1000ML; G/1000ML; G/1000ML
INJECTION, SOLUTION INTRAVENOUS CONTINUOUS
Status: DISCONTINUED | OUTPATIENT
Start: 2018-10-13 | End: 2018-10-13

## 2018-10-13 RX ORDER — PROMETHAZINE HYDROCHLORIDE 25 MG/ML
25 INJECTION, SOLUTION INTRAMUSCULAR; INTRAVENOUS EVERY 6 HOURS PRN
Status: DISCONTINUED | OUTPATIENT
Start: 2018-10-13 | End: 2018-10-16 | Stop reason: HOSPADM

## 2018-10-13 RX ORDER — OXYCODONE HCL 5 MG/5 ML
10 SOLUTION, ORAL ORAL EVERY 4 HOURS PRN
Status: DISCONTINUED | OUTPATIENT
Start: 2018-10-13 | End: 2018-10-14 | Stop reason: ALTCHOICE

## 2018-10-13 RX ORDER — ALUMINA, MAGNESIA, AND SIMETHICONE 2400; 2400; 240 MG/30ML; MG/30ML; MG/30ML
15 SUSPENSION ORAL EVERY 4 HOURS PRN
Status: DISCONTINUED | OUTPATIENT
Start: 2018-10-13 | End: 2018-10-16 | Stop reason: HOSPADM

## 2018-10-13 RX ORDER — SODIUM CHLORIDE 9 MG/ML
INJECTION, SOLUTION INTRAVENOUS CONTINUOUS
Status: DISCONTINUED | OUTPATIENT
Start: 2018-10-13 | End: 2018-10-15

## 2018-10-13 RX ORDER — CYCLOBENZAPRINE HCL 10 MG
10 TABLET ORAL AT BEDTIME
Status: DISCONTINUED | OUTPATIENT
Start: 2018-10-13 | End: 2018-10-14 | Stop reason: ALTCHOICE

## 2018-10-13 RX ADMIN — ENOXAPARIN SODIUM 40 MG: 40 INJECTION SUBCUTANEOUS at 08:59

## 2018-10-13 RX ADMIN — PANTOPRAZOLE SODIUM 40 MG: 40 TABLET, DELAYED RELEASE ORAL at 08:59

## 2018-10-13 RX ADMIN — ONDANSETRON 4 MG: 4 TABLET, ORALLY DISINTEGRATING ORAL at 09:09

## 2018-10-13 RX ADMIN — ACETAMINOPHEN 975 MG: 325 SOLUTION ORAL at 17:52

## 2018-10-13 RX ADMIN — PROMETHAZINE HYDROCHLORIDE 25 MG: 25 INJECTION, SOLUTION INTRAMUSCULAR; INTRAVENOUS at 00:23

## 2018-10-13 RX ADMIN — LORATADINE 10 MG: 10 TABLET ORAL at 08:59

## 2018-10-13 RX ADMIN — SENNOSIDES A AND B 10 ML: 415.36 LIQUID ORAL at 21:17

## 2018-10-13 RX ADMIN — HYDROCHLOROTHIAZIDE 25 MG: 25 TABLET ORAL at 09:00

## 2018-10-13 RX ADMIN — Medication 10 MG: at 21:17

## 2018-10-13 RX ADMIN — SENNOSIDES AND DOCUSATE SODIUM 2 TABLET: 8.6; 5 TABLET ORAL at 08:59

## 2018-10-13 RX ADMIN — PANTOPRAZOLE SODIUM 40 MG: 40 TABLET, DELAYED RELEASE ORAL at 18:35

## 2018-10-13 RX ADMIN — POTASSIUM CHLORIDE, DEXTROSE MONOHYDRATE AND SODIUM CHLORIDE: 150; 5; 450 INJECTION, SOLUTION INTRAVENOUS at 09:14

## 2018-10-13 RX ADMIN — ALUMINUM HYDROXIDE, MAGNESIUM HYDROXIDE, AND DIMETHICONE 15 ML: 400; 400; 40 SUSPENSION ORAL at 10:58

## 2018-10-13 RX ADMIN — SODIUM CHLORIDE: 9 INJECTION, SOLUTION INTRAVENOUS at 10:58

## 2018-10-13 ASSESSMENT — ACTIVITIES OF DAILY LIVING (ADL)
ADLS_ACUITY_SCORE: 12

## 2018-10-13 ASSESSMENT — PAIN DESCRIPTION - DESCRIPTORS
DESCRIPTORS: BURNING
DESCRIPTORS: DISCOMFORT
DESCRIPTORS: BURNING

## 2018-10-13 NOTE — PLAN OF CARE
Problem: Patient Care Overview  Goal: Plan of Care/Patient Progress Review  Outcome: No Change  Temp: 98.6  F (37  C) Temp src: Oral BP: 160/75   Heart Rate: 93 Resp: 18 SpO2: 90 % O2 Device: None (Room air)    Tachy and slightly hypertensive, encouraged PO fluid intake. Pt has had episodes of n/v both zofran and compazine given. Pt also complains of waves of heartburn. Pt has also had moderate pain, oxycodone was given x1 and effective. Up ad fabio to the bathroom and voiding adequately but not saving per pt. Poor toleration of food only a few bites. Continues to use aromatherapy. Pt refused cpap at this time. Right JOHN 5 ml out of serosang drainage, dressing cdi. Triggered lactic which came back 1.1.  Resting between cares will continue to monitor.     Team paged regarding continued n/v and heartburn.

## 2018-10-13 NOTE — PLAN OF CARE
Problem: Patient Care Overview  Goal: Plan of Care/Patient Progress Review  Outcome: No Change  Afeb, vitals stable, 02 sats 82% on room air, placed 4LPM/nc on pt, now in mid to upper 90's, declined to use c-pap due to nausea, nauseated most of shift, phenergan iv x1 with little relief, declined any further antiemetics, c-bands place, nausea continues, HO informed, abd lap sites dry, intact and steri stripped, abd transverse incisional drsg dry and intact, belly round with rare bowel sounds, had 700cc emesis, pt stated that has helped her nausea alittle, lungs diminished, up to bathroom x1, voiding not saving, iv saline locked, offers no further c/o, little sleep tonight,

## 2018-10-13 NOTE — PROGRESS NOTES
General Surgery Progress Note  October 13, 2018    S: Emesis x3 overnight. Yesterday evening patient's drain started having minimal tinge of bile. Still has pain, controlled with meds, ambulating to bathroom, voiding w/o issue. +flatus, no BM.    O:    Temp:  [96.1  F (35.6  C)-98.6  F (37  C)] 97.1  F (36.2  C)  Pulse:  [91-99] 96  Heart Rate:  [] 93  Resp:  [16-18] 18  BP: (143-190)/(73-89) 190/89  SpO2:  [82 %-95 %] 92 %      Intake/Output Summary (Last 24 hours) at 10/13/18 1007  Last data filed at 10/13/18 0914   Gross per 24 hour   Intake              340 ml   Output             1375 ml   Net            -1035 ml     Gen: NAD, lying comfortably in bed  Chest: nonlabored breathing on room air  CV: RRR  Abdomen: soft, ATTP at incisions, mildly distended; incisions c/d/i, staples  Extremities: without cyanosis or edema    Labs:  Lab Results   Component Value Date    WBC 20.2 10/13/2018     Lab Results   Component Value Date    RBC 5.23 10/13/2018     Lab Results   Component Value Date    HGB 16.5 10/13/2018     Lab Results   Component Value Date    HCT 49.4 10/13/2018     Lab Results   Component Value Date    MCV 95 10/13/2018     Lab Results   Component Value Date    MCH 31.5 10/13/2018     Lab Results   Component Value Date    MCHC 33.4 10/13/2018     Lab Results   Component Value Date    RDW 13.7 10/13/2018     Lab Results   Component Value Date     10/13/2018       Last Basic Metabolic Panel:  Lab Results   Component Value Date     10/13/2018      Lab Results   Component Value Date    POTASSIUM 3.7 10/13/2018     Lab Results   Component Value Date    CHLORIDE 90 10/13/2018     Lab Results   Component Value Date    KEILY 10.2 10/13/2018     Lab Results   Component Value Date    CO2 32 10/13/2018     Lab Results   Component Value Date    BUN 24 10/13/2018     Lab Results   Component Value Date    CR 0.79 10/13/2018     Lab Results   Component Value Date     10/13/2018     WBC 20.2  (17.1<--19)  AST 32 (40<--73)  ALT 76 (90<--114)  Tbili 0.9 (1.2<--0.7)    A/P: 61 yo F now POD#3 s/p lap converted to open cholecystectomy (d/t poor exposure 2/2 adhesions) and intraoperative IOC & ERCP w/spincherotomy and stent placement.    - AXR this am given emesis  - Zofran, compazine, phenergan for nausea   - CLD, D5 1/2NS 20KCl @ 75 ml/hr  - Maalox & PPI for GERD  - Home meds restarted    Patient seen with chief resident, Dr. Pearce, who will discuss with staff, Dr. Thorne.    Lance Bailey MD (PGY-1)  General Surgery    ADDENDUM @ 1700:  Spoke with GI regarding patient's biliary stent that has migrated into small bowel. Given her emesis, continued abd pain, and bile tinge to drain fluid, rec RUQ US- ordered. GI will discuss in AM whether any interventions for stent need to be done.    Lance Bailey MD (PGY-1)  General Surgery

## 2018-10-13 NOTE — PLAN OF CARE
Problem: Patient Care Overview  Goal: Plan of Care/Patient Progress Review  AVSS, some abdominal discomfort but more significant is nausea and emesis about 4 episodes, antiemetics given, not helping. Lap sites dry, JOHN patent and site dry. Seen by team, began MIV infusion and also later  determined need to rest bowel  with NG placement and xray and then to LIS. NG placed, with about 400ml fluid immediately output.  NG slipped out andrnew NG placed again, will send to xray.

## 2018-10-13 NOTE — DISCHARGE SUMMARY
[facility] Discharge Summary    Marlin Harman MRN: [id number]   YOB: 1955 Age: 62 year old     Date of Admission:  10/10/2018  Date of Discharge::  10/16/2018   Admitting Physician:  Alex Rendon MD  Discharge Physician:  Dr. Delacruz  Primary Care Physician:         Zechariah Castro          Discharge Diagnosis:   S/p lap converted to open cholecystectomy c/b right hepatic duct injury repaired intraoperatively now with cbd stent.         Procedures:   Open cheolecystectomy  ERCP with sphincterotomy and stent placement          Consultations:   VASCULAR ACCESS CARE ADULT IP CONSULT  VASCULAR ACCESS CARE ADULT IP CONSULT  VASCULAR ACCESS CARE ADULT IP CONSULT          HPI (from H&P):   The patient is a 62-year-old female with a prior history of choledocholithiasis and hyperbilirubinemia with a prior history of an enlarged common bile duct with an obstructing gallstone.  She previously received an ERCP, and she presented for elective cholecystectomy.            Hospital Course:   The patient underwent listed procedure(s) above, which began as a laparoscopic cholecystectomy converted to an open procedure due to advanced gallbladder adhesions. There was the complication of thermal bowel injury during initial adhesion dissection, which was repaired with a stapler . Intraoperative cholangiogram revealed an injury with leak of the R hepatic duct, for which she had a stent and drain placed laparoscopically. She tolerated this well and began to recover and advance her diet. On POD#2 she began to suffer severe dyspepsia, nausea, and multiple episodes of vomiting refractory to symptom control. Imaging on POD#3 revealed a post-operative ileus for which she had an NG placed for decompression. Her LFTs initially elevated, but have trended down appropriately. Otherwise unremarkable hospitalization.  At the time of discharge, she was tolerating PO intake, ambulating, voiding spontaneously  without difficulty, and pain was controlled with oral pain medications. The patient was discharged home in stable and improved condition with instruction to follow up for a repeat ERCP in 6-8 weeks postoperatively.         Final Pathology Result:   Pending at time of discharge         Pertinent Imaging Results:   10/10/18 Abdominal XR  1. Multiple loops of gaseous dilated small bowel.  Differential  includes adynamic ileus versus small bowel obstruction.  2. Bile stent appears inferiorly migrated, now projecting over segment  of expected proximal small bowel.          Medications Prior to Admission:     Prescriptions Prior to Admission   Medication Sig Dispense Refill Last Dose     albuterol (PROAIR HFA/PROVENTIL HFA/VENTOLIN HFA) 108 (90 Base) MCG/ACT inhaler Inhale 2 puffs into the lungs every 6 hours as needed for shortness of breath / dyspnea or wheezing 1 Inhaler 1 10/10/2018 at 0400     Cranberry 250 MG CAPS Take by mouth daily    Past Week at Unknown time     cyclobenzaprine (FLEXERIL) 10 MG tablet Take 1 tablet (10 mg) by mouth At Bedtime 4 tablet 0 Past Week at Unknown time     diphenhydrAMINE (BENADRYL) 25 MG tablet Take 25 mg by mouth nightly as needed for itching or allergies   Past Week at Unknown time     folic acid (FOLVITE) 1 MG tablet Take 1 mg by mouth daily   Past Week at Unknown time     Glucosamine Sulfate 500 MG TABS Take 1 tablet by mouth daily   Past Week at Unknown time     hydrochlorothiazide (HYDRODIURIL) 25 MG tablet Take 1 tablet (25 mg) by mouth daily 14 tablet 0 10/9/2018 at 0800     loratadine (CLARITIN) 10 MG tablet Take 10 mg by mouth daily   10/10/2018 at 0400     vitamin B complex with vitamin C (VITAMIN  B COMPLEX) TABS tablet Take 1 tablet by mouth daily   Past Week at Unknown time     vitamin E 400 UNIT capsule Take 400 Units by mouth daily   10/8/2018 at Unknown time     aspirin 81 MG chewable tablet Take 81 mg by mouth daily   10/4/2018            Discharge Medications:      Current Discharge Medication List      START taking these medications    Details   acetaminophen (TYLENOL) 325 MG tablet Take 2 tablets (650 mg) by mouth every 6 hours as needed for mild pain  Qty: 100 tablet, Refills: 0    Associated Diagnoses: S/P cholecystectomy         CONTINUE these medications which have NOT CHANGED    Details   albuterol (PROAIR HFA/PROVENTIL HFA/VENTOLIN HFA) 108 (90 Base) MCG/ACT inhaler Inhale 2 puffs into the lungs every 6 hours as needed for shortness of breath / dyspnea or wheezing  Qty: 1 Inhaler, Refills: 1    Associated Diagnoses: Chronic seasonal allergic rhinitis due to pollen      Cranberry 250 MG CAPS Take by mouth daily       cyclobenzaprine (FLEXERIL) 10 MG tablet Take 1 tablet (10 mg) by mouth At Bedtime  Qty: 4 tablet, Refills: 0    Associated Diagnoses: Obstruction of bile duct      diphenhydrAMINE (BENADRYL) 25 MG tablet Take 25 mg by mouth nightly as needed for itching or allergies      folic acid (FOLVITE) 1 MG tablet Take 1 mg by mouth daily      Glucosamine Sulfate 500 MG TABS Take 1 tablet by mouth daily      hydrochlorothiazide (HYDRODIURIL) 25 MG tablet Take 1 tablet (25 mg) by mouth daily  Qty: 14 tablet, Refills: 0    Associated Diagnoses: Benign essential hypertension      loratadine (CLARITIN) 10 MG tablet Take 10 mg by mouth daily      vitamin B complex with vitamin C (VITAMIN  B COMPLEX) TABS tablet Take 1 tablet by mouth daily      vitamin E 400 UNIT capsule Take 400 Units by mouth daily      aspirin 81 MG chewable tablet Take 81 mg by mouth daily                  Day of Discharge Physical Exam:   Temp:  [96.9  F (36.1  C)-98.4  F (36.9  C)] 98.4  F (36.9  C)  Heart Rate:  [75-90] 82  Resp:  [18] 18  BP: (147-157)/(66-74) 147/74  SpO2:  [93 %-96 %] 93 %  General: A&O, NAD, lying comfortably in bed  Chest: NLB on RA  Abd: Soft, nondistended, nontender, RUQ incision c/d/i with staples in place, RUQ drain in place with mostly serosang and scant bilious  drainage.  Extremities: WWP  Neuro: Moving all extremities spontaneously without apparent deficit         Discharge Instructions and Follow-Up:     Reason for your hospital stay   Elective cholecystectomy     Adult [facility] Follow-up and recommended labs and tests   Follow up with Dr. Rendon in 1 week , at (location with clinic name or city) [facility], within 1 week  to evaluate after surgery. The following labs/tests are recommended: Hepatic function panel and CBC.    Appointments on [place] and/or [facility] (with [facility] or [facility] provider or service). Call [phone] if you haven't heard regarding these appointments within 7 days of discharge.     Discharge Instructions   Please no lifting more than 20 lbs for 2 weeks. You will follow up with your surgeon in 1 week. If you develop any fevers (T>100.4) or new redness/swelling at your incision site then please go to your nearest emergency department or call  and ask for the general surgery resident on call. Do not soak your incisions. Washing with soap and water is okay. Your wounds have staples on them. These can be taken out by any healthcare professional in 1-2 weeks. Your prior drain site has a dressing that will fall of on its own. Then you can wash that site with soap and water but, again, do not soak it.     Full Code     Diet   Follow this diet upon discharge: Orders Placed This Encounter     Regular Diet Adult          Condition at discharge: Stable      - - - - - - - - - - - - - - - - - -  MD [facility] PGY1  General Surgery Service

## 2018-10-14 ENCOUNTER — APPOINTMENT (OUTPATIENT)
Dept: GENERAL RADIOLOGY | Facility: CLINIC | Age: 63
DRG: 415 | End: 2018-10-14
Attending: SURGERY
Payer: COMMERCIAL

## 2018-10-14 LAB
ALBUMIN SERPL-MCNC: 2.4 G/DL (ref 3.4–5)
ALP SERPL-CCNC: 95 U/L (ref 40–150)
ALT SERPL W P-5'-P-CCNC: 52 U/L (ref 0–50)
ANION GAP SERPL CALCULATED.3IONS-SCNC: 6 MMOL/L (ref 3–14)
AST SERPL W P-5'-P-CCNC: 27 U/L (ref 0–45)
BILIRUB SERPL-MCNC: 1 MG/DL (ref 0.2–1.3)
BUN SERPL-MCNC: 22 MG/DL (ref 7–30)
CALCIUM SERPL-MCNC: 8.2 MG/DL (ref 8.5–10.1)
CHLORIDE SERPL-SCNC: 96 MMOL/L (ref 94–109)
CO2 SERPL-SCNC: 35 MMOL/L (ref 20–32)
CREAT SERPL-MCNC: 0.7 MG/DL (ref 0.52–1.04)
GFR SERPL CREATININE-BSD FRML MDRD: 84 ML/MIN/1.7M2
GLUCOSE SERPL-MCNC: 125 MG/DL (ref 70–99)
POTASSIUM SERPL-SCNC: 3.1 MMOL/L (ref 3.4–5.3)
POTASSIUM SERPL-SCNC: 3.3 MMOL/L (ref 3.4–5.3)
PROT SERPL-MCNC: 6.2 G/DL (ref 6.8–8.8)
SODIUM SERPL-SCNC: 137 MMOL/L (ref 133–144)

## 2018-10-14 PROCEDURE — 25000132 ZZH RX MED GY IP 250 OP 250 PS 637: Performed by: SURGERY

## 2018-10-14 PROCEDURE — 40000275 ZZH STATISTIC RCP TIME EA 10 MIN

## 2018-10-14 PROCEDURE — 25000132 ZZH RX MED GY IP 250 OP 250 PS 637: Performed by: STUDENT IN AN ORGANIZED HEALTH CARE EDUCATION/TRAINING PROGRAM

## 2018-10-14 PROCEDURE — 12000008 ZZH R&B INTERMEDIATE UMMC

## 2018-10-14 PROCEDURE — 74019 RADEX ABDOMEN 2 VIEWS: CPT

## 2018-10-14 PROCEDURE — 25000128 H RX IP 250 OP 636: Performed by: STUDENT IN AN ORGANIZED HEALTH CARE EDUCATION/TRAINING PROGRAM

## 2018-10-14 PROCEDURE — 36415 COLL VENOUS BLD VENIPUNCTURE: CPT | Performed by: SURGERY

## 2018-10-14 PROCEDURE — 94660 CPAP INITIATION&MGMT: CPT

## 2018-10-14 PROCEDURE — 80053 COMPREHEN METABOLIC PANEL: CPT | Performed by: STUDENT IN AN ORGANIZED HEALTH CARE EDUCATION/TRAINING PROGRAM

## 2018-10-14 PROCEDURE — 84132 ASSAY OF SERUM POTASSIUM: CPT | Performed by: SURGERY

## 2018-10-14 PROCEDURE — 36415 COLL VENOUS BLD VENIPUNCTURE: CPT | Performed by: STUDENT IN AN ORGANIZED HEALTH CARE EDUCATION/TRAINING PROGRAM

## 2018-10-14 RX ORDER — POTASSIUM CHLORIDE 750 MG/1
20-40 TABLET, EXTENDED RELEASE ORAL
Status: DISCONTINUED | OUTPATIENT
Start: 2018-10-14 | End: 2018-10-16 | Stop reason: HOSPADM

## 2018-10-14 RX ORDER — POTASSIUM CL/LIDO/0.9 % NACL 10MEQ/0.1L
10 INTRAVENOUS SOLUTION, PIGGYBACK (ML) INTRAVENOUS
Status: DISCONTINUED | OUTPATIENT
Start: 2018-10-14 | End: 2018-10-14 | Stop reason: RX

## 2018-10-14 RX ORDER — ACETAMINOPHEN 325 MG/1
975 TABLET ORAL EVERY 8 HOURS
Status: DISCONTINUED | OUTPATIENT
Start: 2018-10-14 | End: 2018-10-16 | Stop reason: HOSPADM

## 2018-10-14 RX ORDER — LABETALOL HYDROCHLORIDE 5 MG/ML
10 INJECTION, SOLUTION INTRAVENOUS EVERY 6 HOURS PRN
Status: DISCONTINUED | OUTPATIENT
Start: 2018-10-14 | End: 2018-10-16 | Stop reason: HOSPADM

## 2018-10-14 RX ORDER — AMOXICILLIN 250 MG
1 CAPSULE ORAL 2 TIMES DAILY
Status: DISCONTINUED | OUTPATIENT
Start: 2018-10-14 | End: 2018-10-16 | Stop reason: HOSPADM

## 2018-10-14 RX ORDER — CYCLOBENZAPRINE HCL 10 MG
10 TABLET ORAL AT BEDTIME
Status: DISCONTINUED | OUTPATIENT
Start: 2018-10-14 | End: 2018-10-16 | Stop reason: HOSPADM

## 2018-10-14 RX ORDER — POTASSIUM CHLORIDE 29.8 MG/ML
20 INJECTION INTRAVENOUS
Status: DISCONTINUED | OUTPATIENT
Start: 2018-10-14 | End: 2018-10-16 | Stop reason: HOSPADM

## 2018-10-14 RX ORDER — POTASSIUM CHLORIDE 1.5 G/1.58G
20-40 POWDER, FOR SOLUTION ORAL
Status: DISCONTINUED | OUTPATIENT
Start: 2018-10-14 | End: 2018-10-16 | Stop reason: HOSPADM

## 2018-10-14 RX ORDER — POTASSIUM CHLORIDE 7.45 MG/ML
10 INJECTION INTRAVENOUS
Status: DISCONTINUED | OUTPATIENT
Start: 2018-10-14 | End: 2018-10-16 | Stop reason: HOSPADM

## 2018-10-14 RX ORDER — OXYCODONE HYDROCHLORIDE 10 MG/1
10 TABLET ORAL EVERY 4 HOURS PRN
Status: DISCONTINUED | OUTPATIENT
Start: 2018-10-14 | End: 2018-10-15

## 2018-10-14 RX ORDER — LORATADINE 10 MG/1
10 TABLET ORAL DAILY
Status: DISCONTINUED | OUTPATIENT
Start: 2018-10-15 | End: 2018-10-16 | Stop reason: HOSPADM

## 2018-10-14 RX ORDER — AMOXICILLIN 250 MG
2 CAPSULE ORAL 2 TIMES DAILY
Status: DISCONTINUED | OUTPATIENT
Start: 2018-10-14 | End: 2018-10-16 | Stop reason: HOSPADM

## 2018-10-14 RX ORDER — HYDRALAZINE HYDROCHLORIDE 20 MG/ML
10 INJECTION INTRAMUSCULAR; INTRAVENOUS EVERY 6 HOURS PRN
Status: DISCONTINUED | OUTPATIENT
Start: 2018-10-14 | End: 2018-10-16 | Stop reason: HOSPADM

## 2018-10-14 RX ORDER — PANTOPRAZOLE SODIUM 40 MG/1
40 TABLET, DELAYED RELEASE ORAL
Status: DISCONTINUED | OUTPATIENT
Start: 2018-10-14 | End: 2018-10-16 | Stop reason: HOSPADM

## 2018-10-14 RX ORDER — HYDROCHLOROTHIAZIDE 25 MG/1
25 TABLET ORAL DAILY
Status: DISCONTINUED | OUTPATIENT
Start: 2018-10-15 | End: 2018-10-16 | Stop reason: HOSPADM

## 2018-10-14 RX ADMIN — Medication 25 MG: at 07:56

## 2018-10-14 RX ADMIN — POTASSIUM CHLORIDE 20 MEQ: 750 TABLET, EXTENDED RELEASE ORAL at 17:50

## 2018-10-14 RX ADMIN — ENOXAPARIN SODIUM 40 MG: 40 INJECTION SUBCUTANEOUS at 07:55

## 2018-10-14 RX ADMIN — POTASSIUM CHLORIDE 40 MEQ: 750 TABLET, EXTENDED RELEASE ORAL at 15:42

## 2018-10-14 RX ADMIN — DOCUSATE SODIUM 50 MG: 50 LIQUID ORAL at 07:57

## 2018-10-14 RX ADMIN — SODIUM CHLORIDE: 9 INJECTION, SOLUTION INTRAVENOUS at 12:37

## 2018-10-14 RX ADMIN — ACETAMINOPHEN 975 MG: 325 TABLET, FILM COATED ORAL at 15:42

## 2018-10-14 RX ADMIN — ACETAMINOPHEN 650 MG: 325 SOLUTION ORAL at 07:58

## 2018-10-14 RX ADMIN — CYCLOBENZAPRINE HYDROCHLORIDE 10 MG: 10 TABLET, FILM COATED ORAL at 21:19

## 2018-10-14 RX ADMIN — SENNOSIDES A AND B 10 ML: 415.36 LIQUID ORAL at 07:56

## 2018-10-14 RX ADMIN — PANTOPRAZOLE SODIUM 40 MG: 40 TABLET, DELAYED RELEASE ORAL at 15:42

## 2018-10-14 RX ADMIN — PANTOPRAZOLE SODIUM 40 MG: 40 TABLET, DELAYED RELEASE ORAL at 07:56

## 2018-10-14 RX ADMIN — LORATADINE 10 MG: 5 SOLUTION ORAL at 07:56

## 2018-10-14 ASSESSMENT — ACTIVITIES OF DAILY LIVING (ADL)
ADLS_ACUITY_SCORE: 12

## 2018-10-14 ASSESSMENT — PAIN DESCRIPTION - DESCRIPTORS: DESCRIPTORS: BURNING

## 2018-10-14 NOTE — PLAN OF CARE
Problem: Patient Care Overview  Goal: Plan of Care/Patient Progress Review  Outcome: No Change  VSS on 2-3L of O2, she does desat while sleeping. NG with minimal output during clamping trial. NG removed per order, no on clear liquid diet. Adequate UOP, passing flatus and BM today. IVM still at 75ml/hr. Marlin has minimal pain.

## 2018-10-14 NOTE — PLAN OF CARE
Problem: Patient Care Overview  Goal: Plan of Care/Patient Progress Review  Outcome: No Change  Temp: 97.6  F (36.4  C) Temp src: Oral BP: 158/67 Pulse: 86   Resp: 18 SpO2: 94 % O2 Device: Nasal cannula Oxygen Delivery: 3 LPM  AVSS on 3L via NC, mild to moderate pain which scheduled tylenol was effective. Denies n/v this shift. NG tube to LIS, xray and abdominal ultrasound done.  ml output. Lap sites kyara with steri-strips, transverse incision covered with dry dressing and cdi. Left JOHN dressing cdi, with scant output. MIVF running in left PIV at 75 ml/hr. Pt NPO except meds and ice chips. SBA to bathroom, voiding adequately but not saving. Resting between cares. Cpap not in use due to NG tube.

## 2018-10-14 NOTE — PROGRESS NOTES
General Surgery Progress Note  October 14, 2018    S: C/o lots of discomfort in nose and throat with NG, nausea resolved this AM. Much more flatus today and decreased distension, no BM. Hypertensive with systolic in 170s overnight, on home bp meds.    O:    Temp:  [96.3  F (35.7  C)-98.1  F (36.7  C)] 96.3  F (35.7  C)  Pulse:  [] 88  Resp:  [16-18] 16  BP: (158-177)/(63-79) 174/78  SpO2:  [91 %-95 %] 91 %      Intake/Output Summary (Last 24 hours) at 10/14/18 0843  Last data filed at 10/14/18 0800   Gross per 24 hour   Intake             1365 ml   Output             2580 ml   Net            -1215 ml       Gen: NAD, lying comfortably in bed  Chest: nonlabored breathing  CV: RRR  Abdomen: soft, RUQ tenderness appropriate, less distended than yesterday  Extremities: without cyanosis or edema    10/13Labs:  Lab Results   Component Value Date    WBC 20.2 10/13/2018     Lab Results   Component Value Date    RBC 5.23 10/13/2018     Lab Results   Component Value Date    HGB 16.5 10/13/2018     Lab Results   Component Value Date    HCT 49.4 10/13/2018     Lab Results   Component Value Date    MCV 95 10/13/2018     Lab Results   Component Value Date    MCH 31.5 10/13/2018     Lab Results   Component Value Date    MCHC 33.4 10/13/2018     Lab Results   Component Value Date    RDW 13.7 10/13/2018     Lab Results   Component Value Date     10/13/2018       Last Basic Metabolic Panel:  Lab Results   Component Value Date     10/13/2018      Lab Results   Component Value Date    POTASSIUM 3.7 10/13/2018     Lab Results   Component Value Date    CHLORIDE 90 10/13/2018     Lab Results   Component Value Date    KEILY 10.2 10/13/2018     Lab Results   Component Value Date    CO2 32 10/13/2018     Lab Results   Component Value Date    BUN 24 10/13/2018     Lab Results   Component Value Date    CR 0.79 10/13/2018     Lab Results   Component Value Date     10/13/2018         A/P: 61 yo F now POD#3 s/p lap  converted to open cholecystectomy (d/t poor exposure 2/2 adhesions) and intraoperative IOC & ERCP w/spincherotomy and stent placement, now with postoperative ileus that is resolving.    - NG clamp trial today. If 4h residual < 400 ml, ok to remove NG and advance diet to clears  - added as needed labetalol and hydralazine for BP control. On home HYDROCHLOROTHIAZIDE. BP goal < 160/90  - RUQ yesterday showed stent migration to small bowel. GI recommends repeat erect 2 view abdominal films to confirm stent placement as discrepency present between XR and US.  - keep R drain in place for 3 wks  - pain ctrl po oxycodone  - lovenox, ambulate, OOB, PPI    Seen with chief , discussed with staff Dr. Mati Walker, DO

## 2018-10-14 NOTE — PLAN OF CARE
Problem: Patient Care Overview  Goal: Plan of Care/Patient Progress Review  Outcome: No Change  Afeb, vitals stable, B/P 170/70's. 02 sats 95-92% on 3LPM/nc, c/o throat pain (from ng), declines pain meds, abd lap sites dry, intact and steri stripped, abd incision drsg covered, intact, belly round, soft with positive bowel sounds, no gas or stool yet, ng to lis scant output this shift, walked to bathroom x2 with SBA, voiding in good amts, JOHN to bulb suction, scant output, iv infusing, really wants ng out, no c/o nausea, no vomiting, npo, appeared to rest/sleep between cares, offers no further c/o

## 2018-10-15 LAB
ALBUMIN SERPL-MCNC: 2.5 G/DL (ref 3.4–5)
ALP SERPL-CCNC: 102 U/L (ref 40–150)
ALT SERPL W P-5'-P-CCNC: 55 U/L (ref 0–50)
ANION GAP SERPL CALCULATED.3IONS-SCNC: 5 MMOL/L (ref 3–14)
AST SERPL W P-5'-P-CCNC: 27 U/L (ref 0–45)
BASOPHILS # BLD AUTO: 0 10E9/L (ref 0–0.2)
BASOPHILS NFR BLD AUTO: 0.2 %
BILIRUB SERPL-MCNC: 0.8 MG/DL (ref 0.2–1.3)
BUN SERPL-MCNC: 18 MG/DL (ref 7–30)
CALCIUM SERPL-MCNC: 8.2 MG/DL (ref 8.5–10.1)
CHLORIDE SERPL-SCNC: 102 MMOL/L (ref 94–109)
CO2 SERPL-SCNC: 31 MMOL/L (ref 20–32)
COPATH REPORT: NORMAL
CREAT SERPL-MCNC: 0.65 MG/DL (ref 0.52–1.04)
DIFFERENTIAL METHOD BLD: ABNORMAL
EOSINOPHIL # BLD AUTO: 0.5 10E9/L (ref 0–0.7)
EOSINOPHIL NFR BLD AUTO: 3.6 %
ERYTHROCYTE [DISTWIDTH] IN BLOOD BY AUTOMATED COUNT: 13.6 % (ref 10–15)
GFR SERPL CREATININE-BSD FRML MDRD: >90 ML/MIN/1.7M2
GLUCOSE BLDC GLUCOMTR-MCNC: 101 MG/DL (ref 70–99)
GLUCOSE BLDC GLUCOMTR-MCNC: 119 MG/DL (ref 70–99)
GLUCOSE SERPL-MCNC: 100 MG/DL (ref 70–99)
HCT VFR BLD AUTO: 42.1 % (ref 35–47)
HGB BLD-MCNC: 13.3 G/DL (ref 11.7–15.7)
IMM GRANULOCYTES # BLD: 0 10E9/L (ref 0–0.4)
IMM GRANULOCYTES NFR BLD: 0.2 %
LYMPHOCYTES # BLD AUTO: 1.5 10E9/L (ref 0.8–5.3)
LYMPHOCYTES NFR BLD AUTO: 11.6 %
MCH RBC QN AUTO: 31.1 PG (ref 26.5–33)
MCHC RBC AUTO-ENTMCNC: 31.6 G/DL (ref 31.5–36.5)
MCV RBC AUTO: 99 FL (ref 78–100)
MONOCYTES # BLD AUTO: 1.2 10E9/L (ref 0–1.3)
MONOCYTES NFR BLD AUTO: 9 %
NEUTROPHILS # BLD AUTO: 9.9 10E9/L (ref 1.6–8.3)
NEUTROPHILS NFR BLD AUTO: 75.4 %
NRBC # BLD AUTO: 0 10*3/UL
NRBC BLD AUTO-RTO: 0 /100
PLATELET # BLD AUTO: 268 10E9/L (ref 150–450)
POTASSIUM SERPL-SCNC: 3.9 MMOL/L (ref 3.4–5.3)
PROT SERPL-MCNC: 6.4 G/DL (ref 6.8–8.8)
RBC # BLD AUTO: 4.27 10E12/L (ref 3.8–5.2)
SODIUM SERPL-SCNC: 139 MMOL/L (ref 133–144)
WBC # BLD AUTO: 13.1 10E9/L (ref 4–11)

## 2018-10-15 PROCEDURE — 36415 COLL VENOUS BLD VENIPUNCTURE: CPT | Performed by: STUDENT IN AN ORGANIZED HEALTH CARE EDUCATION/TRAINING PROGRAM

## 2018-10-15 PROCEDURE — 25000128 H RX IP 250 OP 636: Performed by: STUDENT IN AN ORGANIZED HEALTH CARE EDUCATION/TRAINING PROGRAM

## 2018-10-15 PROCEDURE — 94660 CPAP INITIATION&MGMT: CPT

## 2018-10-15 PROCEDURE — 00000146 ZZHCL STATISTIC GLUCOSE BY METER IP

## 2018-10-15 PROCEDURE — 80053 COMPREHEN METABOLIC PANEL: CPT | Performed by: STUDENT IN AN ORGANIZED HEALTH CARE EDUCATION/TRAINING PROGRAM

## 2018-10-15 PROCEDURE — 85025 COMPLETE CBC W/AUTO DIFF WBC: CPT | Performed by: STUDENT IN AN ORGANIZED HEALTH CARE EDUCATION/TRAINING PROGRAM

## 2018-10-15 PROCEDURE — 40000275 ZZH STATISTIC RCP TIME EA 10 MIN

## 2018-10-15 PROCEDURE — 25000132 ZZH RX MED GY IP 250 OP 250 PS 637: Performed by: SURGERY

## 2018-10-15 PROCEDURE — 25000132 ZZH RX MED GY IP 250 OP 250 PS 637: Performed by: STUDENT IN AN ORGANIZED HEALTH CARE EDUCATION/TRAINING PROGRAM

## 2018-10-15 PROCEDURE — 12000008 ZZH R&B INTERMEDIATE UMMC

## 2018-10-15 RX ADMIN — ACETAMINOPHEN 975 MG: 325 TABLET, FILM COATED ORAL at 00:03

## 2018-10-15 RX ADMIN — POTASSIUM CHLORIDE 20 MEQ: 750 TABLET, EXTENDED RELEASE ORAL at 16:00

## 2018-10-15 RX ADMIN — ACETAMINOPHEN 975 MG: 325 TABLET, FILM COATED ORAL at 16:00

## 2018-10-15 RX ADMIN — PANTOPRAZOLE SODIUM 40 MG: 40 TABLET, DELAYED RELEASE ORAL at 16:00

## 2018-10-15 RX ADMIN — CYCLOBENZAPRINE HYDROCHLORIDE 10 MG: 10 TABLET, FILM COATED ORAL at 21:10

## 2018-10-15 RX ADMIN — POTASSIUM CHLORIDE 40 MEQ: 750 TABLET, EXTENDED RELEASE ORAL at 00:03

## 2018-10-15 RX ADMIN — HYDROCHLOROTHIAZIDE 25 MG: 25 TABLET ORAL at 08:50

## 2018-10-15 RX ADMIN — LORATADINE 10 MG: 10 TABLET ORAL at 08:50

## 2018-10-15 RX ADMIN — PANTOPRAZOLE SODIUM 40 MG: 40 TABLET, DELAYED RELEASE ORAL at 08:50

## 2018-10-15 RX ADMIN — ACETAMINOPHEN 975 MG: 325 TABLET, FILM COATED ORAL at 08:50

## 2018-10-15 RX ADMIN — ENOXAPARIN SODIUM 40 MG: 40 INJECTION SUBCUTANEOUS at 08:51

## 2018-10-15 RX ADMIN — POTASSIUM CHLORIDE 20 MEQ: 750 TABLET, EXTENDED RELEASE ORAL at 01:56

## 2018-10-15 ASSESSMENT — ACTIVITIES OF DAILY LIVING (ADL)
ADLS_ACUITY_SCORE: 12

## 2018-10-15 ASSESSMENT — PAIN DESCRIPTION - DESCRIPTORS: DESCRIPTORS: SORE

## 2018-10-15 NOTE — PLAN OF CARE
Problem: Patient Care Overview  Goal: Plan of Care/Patient Progress Review  Outcome: Improving  Temp: 98.8  F (37.1  C) Temp src: Oral BP: 157/62 Pulse: 96   Resp: 18 SpO2: 94 % O2 Device: Nasal cannula Oxygen Delivery: 4 LPM  AVSS on 4L of O2 via NC, desats very quickly when oxygen comes off, denies pain this evening. Denies n/v. Lap sites kyara with steri strips, transverse abd incision cover with dry dressing and cdi. Left JOHN dressing cdi and had scant output. NS running in left PIV at 75 ml/hr.  Tolerating clear diet. +BS, +flatus, and +BM x2 this shift, bowel medications held. SBA to bathroom, voiding adequately but not saving.  Pt refused CPAP. Resting between cares.

## 2018-10-15 NOTE — PLAN OF CARE
Problem: Patient Care Overview  Goal: Plan of Care/Patient Progress Review  AVSS. Pt denies pain. Lap sites with steri-strips. RLQ JOHN with little output. Pt voiding spont, not saving. BM x 3 this shift. Pt tolerating regular diet. Up ad fabio. Frequently walks the halls. Pt eager to discharge. Hopeful for today/tomorrow. PIV saline locked. Continue with poc.

## 2018-10-15 NOTE — PROGRESS NOTES
"General Surgery Progress Note    Subjective: No acute issues overnight. Pain well controlled. Denies abdominal pain and N/V. Tolerating CLD. Voiding spontaneously. + flatus, + BS, BMx4.  Ambulating.      Objective:   /55 (BP Location: Left arm)  Pulse 72  Temp 96.3  F (35.7  C) (Oral)  Resp 18  Ht 1.63 m (5' 4.17\")  Wt 50.3 kg (110 lb 14.4 oz)  SpO2 94%  BMI 18.93 kg/m2    PE:  Gen: Awake, alert, NAD   CV: noncyanotic  Resp: non-labored on room air  Abd: Soft, non-distended, NTTP, no signs of peritonitis  Ext: warm and well perfused  Incision: c/d/i    @IO@       Intake/Output Summary (Last 24 hours) at 10/15/18 0757  Last data filed at 10/15/18 0600   Gross per 24 hour   Intake          2938.75 ml   Output              539 ml   Net          2399.75 ml       Recent labs:  Component Value Flag Ref Range Units Status Collected Lab   Sodium 139  133 - 144 mmol/L Final 10/15/2018  6:56 AM 51   Potassium 3.9  3.4 - 5.3 mmol/L Final 10/15/2018  6:56 AM 51   Chloride 102  94 - 109 mmol/L Final 10/15/2018  6:56 AM 51   Carbon Dioxide 31  20 - 32 mmol/L Final 10/15/2018  6:56 AM 51   Anion Gap 5  3 - 14 mmol/L Final 10/15/2018  6:56 AM 51   Glucose 100 (H) 70 - 99 mg/dL Final 10/15/2018  6:56 AM 51   Urea Nitrogen 18  7 - 30 mg/dL Final 10/15/2018  6:56 AM 51   Creatinine 0.65  0.52 - 1.04 mg/dL Final 10/15/2018  6:56 AM 51   GFR Estimate >90  >60 mL/min/1.7m2 Final 10/15/2018  6:56 AM 51   Comment:   Non  GFR Calc   GFR Estimate If Black >90  >60 mL/min/1.7m2 Final 10/15/2018  6:56 AM 51   Comment:   African American GFR Calc   Calcium 8.2 (L) 8.5 - 10.1 mg/dL Final 10/15/2018  6:56 AM 51   Bilirubin Total 0.8  0.2 - 1.3 mg/dL Final 10/15/2018  6:56 AM 51   Albumin 2.5 (L) 3.4 - 5.0 g/dL Final 10/15/2018  6:56 AM 51   Protein Total 6.4 (L) 6.8 - 8.8 g/dL Final 10/15/2018  6:56 AM 51   Alkaline Phosphatase 102  40 - 150 U/L Final 10/15/2018  6:56 AM 51   ALT 55 (H) 0 - 50 U/L Final 10/15/2018 "  6:56 AM 51   AST 27  0 - 45 U/L Final 10/15/2018  6:56 AM 51       Ref Range Units Status Collected Lab      WBC 13.1 (H) 4.0 - 11.0 10e9/L Final 10/15/2018  6:56 AM 51   RBC Count 4.27  3.8 - 5.2 10e12/L Final 10/15/2018  6:56 AM 51   Hemoglobin 13.3  11.7 - 15.7 g/dL Final 10/15/2018  6:56 AM 51   Hematocrit 42.1  35.0 - 47.0 % Final 10/15/2018  6:56 AM 51   MCV 99  78 - 100 fl Final 10/15/2018  6:56 AM 51   MCH 31.1  26.5 - 33.0 pg Final 10/15/2018  6:56 AM 51   MCHC 31.6  31.5 - 36.5 g/dL Final 10/15/2018  6:56 AM 51   RDW 13.6  10.0 - 15.0 % Final 10/15/2018  6:56 AM 51   Platelet Count 268  150 - 450 10e9/L Final 10/15/2018  6:56 AM 51   Diff Method     Final 10/15/2018  6:56 AM 51   Automated Method   % Neutrophils 75.4   % Final 10/15/2018  6:56 AM 51   % Lymphocytes 11.6   % Final 10/15/2018  6:56 AM 51   % Monocytes 9.0   % Final 10/15/2018  6:56 AM 51   % Eosinophils 3.6   % Final 10/15/2018  6:56 AM 51   % Basophils 0.2   % Final 10/15/2018  6:56 AM 51   % Immature Granulocytes 0.2   % Final 10/15/2018  6:56 AM 51   Nucleated RBCs 0  0 /100 Final 10/15/2018  6:56 AM 51   Absolute Neutrophil 9.9 (H) 1.6 - 8.3 10e9/L Final 10/15/2018  6:56 AM 51   Absolute Lymphocytes 1.5  0.8 - 5.3 10e9/L Final 10/15/2018  6:56 AM 51   Absolute Monocytes 1.2  0.0 - 1.3 10e9/L Final 10/15/2018  6:56 AM 51   Absolute Eosinophils 0.5  0.0 - 0.7 10e9/L Final 10/15/2018  6:56 AM 51   Absolute Basophils 0.0  0.0 - 0.2 10e9/L Final 10/15/2018  6:56 AM 51   Abs Immature Granulocytes 0.0  0 - 0.4 10e9/L Final 10/15/2018  6:56 AM 51   Absolute Nucleated RBC 0.0    Final 10/15/2018  6:56 AM 51         Recent imaging reviewed.      NEURO Scheduled PO Tylenol Q8 and PO Oxycodone PRN   CV HDS.    PULM Aggressive pulmonary toilet and I/S.   FEN/GI NS @ 75 ml/hr. Continue current diet     No acute issues, good UOP    HEME Hgb as above. Postop anemia expected for this surgery.  Continue to monitor. No transfusions indicated at this  time   ID Afebrile, no leukocytosis.    Antibiotics: None    ENDO No issues   ACTIVITY Up as tolerated  PT/OT consulted  Ambulate at least TID    PPx Prophylactic lovenox   DISPO Home, anticipated d/c to home TBD       A/P: Marlin Harman is a 62 year old female, who is POD # 5  S/p lap mirian converted to open due to extensive adhesions.  Patient had nausea and vomiting with associated leukocytosis over the weekend that has subsequently resolved.  Imaging was concerning for adynamic ileus or SBO prompting placement of NG tube that was removed after successful clamp trial.  Patient has had multiple BMs.     Neuro:  -continue PO pain control    GI:  - PPI  -R drain for 3 weeks  -biliary stent in place    Heme:  -Lovenox    FEN:  -mIVF  -advance diet to regular    Dispo:  -likely tomorrow if afebrile, no leukocytosis      Seen and discussed with chief resident, Dr. Ortiz, who will discuss with staff: Dr. Mesa.    Zane Perez, MS3  General Surgery

## 2018-10-15 NOTE — PROGRESS NOTES
BRIEF GI UPDATE NOTE:    -- Reviewed erect film. Bile duct stent seems to be in position. No interventions recommended from a GI perspective.   -- Follow up has already been arranged by Dr. Mckeon. Will need a repeat ERCP in 6-8 weeks unless a shorted interval is mandated due to ongoing JOHN bilious drainage.  -- Supportive management as per primary team.    Please do not hesitate to call us back with questions/concerns. Case discussed with Dr. Dao and the primary team.    Tatyana Deshpande  P [phone]  Gastroenterology Fellow

## 2018-10-15 NOTE — PROGRESS NOTES
"General Surgery Progress Note    No abdominal pain, nausea or vomiting. Passing flatus and having bowel movements. CBD stent placement was checked with abd film and reviewed by GI and is in place. Appropriate follow up ERCP scheduled. No acute events.     /55 (BP Location: Left arm)  Pulse 72  Temp 96.3  F (35.7  C) (Oral)  Resp 18  Ht 1.63 m (5' 4.17\")  Wt 50.3 kg (110 lb 14.4 oz)  SpO2 94%  BMI 18.93 kg/m2    Well appearing in nad  Nlb on RA  Abd soft, appropriately tender, not distended. Mostly serosang but some bilious output in JOHN.     WBC 20.2->13.1  ALT 52->55  AST 27->27  TBili 1.0->0.8     / 24 hrs  JOHN drain 15 / 24 hrs    abd xray shows cbd stent in place    A/P: 61 y/o F POD 4 lap converted to open cholecystectomy c/b right hepatic duct injury repaired intraoperatively now with cbd stent, minimally elevated ALT, and scant bilious drainage from JOHN. Leukocytosis resolving.  - Regular diet today  - CBC tomorrow AM  - Stopping IV fluids (good PO intake yesterday)  - Also stopping oxycodone (has not used any in 3-4 days) and IV dilaudid. Continuing with scheduled tylenol  - Encourage ambulation. Lovenox.    Evaluated w/ chief, Dr Ortiz    --  Wilbert Khalil  General Surgery PGY2    "

## 2018-10-15 NOTE — PLAN OF CARE
Problem: Patient Care Overview  Goal: Plan of Care/Patient Progress Review  Outcome: No Change  Afeb, vitals stable, 02 sats 97-98% on 3LPM/nc, c/o mild belly pain, scheduled tylenol with relief, no c/o nausea, abd round, obese with positive bowel sounds, gas and small amt of stool out,(had 2-3 stools yesterday) lungs diminished, iv infusing, up to bathroom with sba, voiding, declines to save, K replaced thru the night, recheck this am, on cld, JOHN to bulb suction, scant amts out, offers no further c/o, appeared to rest between cares, hopes for discharge later today

## 2018-10-16 VITALS
SYSTOLIC BLOOD PRESSURE: 147 MMHG | TEMPERATURE: 98.4 F | RESPIRATION RATE: 18 BRPM | WEIGHT: 110.9 LBS | DIASTOLIC BLOOD PRESSURE: 74 MMHG | HEART RATE: 72 BPM | HEIGHT: 64 IN | OXYGEN SATURATION: 93 % | BODY MASS INDEX: 18.93 KG/M2

## 2018-10-16 LAB
ALBUMIN SERPL-MCNC: 2.5 G/DL (ref 3.4–5)
ALP SERPL-CCNC: 112 U/L (ref 40–150)
ALT SERPL W P-5'-P-CCNC: 52 U/L (ref 0–50)
ANION GAP SERPL CALCULATED.3IONS-SCNC: 6 MMOL/L (ref 3–14)
AST SERPL W P-5'-P-CCNC: 26 U/L (ref 0–45)
BILIRUB DIRECT SERPL-MCNC: 0.3 MG/DL (ref 0–0.2)
BILIRUB SERPL-MCNC: 0.6 MG/DL (ref 0.2–1.3)
BUN SERPL-MCNC: 14 MG/DL (ref 7–30)
CALCIUM SERPL-MCNC: 8.4 MG/DL (ref 8.5–10.1)
CHLORIDE SERPL-SCNC: 100 MMOL/L (ref 94–109)
CO2 SERPL-SCNC: 32 MMOL/L (ref 20–32)
CREAT SERPL-MCNC: 0.63 MG/DL (ref 0.52–1.04)
ERYTHROCYTE [DISTWIDTH] IN BLOOD BY AUTOMATED COUNT: 13.5 % (ref 10–15)
GFR SERPL CREATININE-BSD FRML MDRD: >90 ML/MIN/1.7M2
GLUCOSE SERPL-MCNC: 98 MG/DL (ref 70–99)
HCT VFR BLD AUTO: 43.6 % (ref 35–47)
HGB BLD-MCNC: 13.6 G/DL (ref 11.7–15.7)
MCH RBC QN AUTO: 30.6 PG (ref 26.5–33)
MCHC RBC AUTO-ENTMCNC: 31.2 G/DL (ref 31.5–36.5)
MCV RBC AUTO: 98 FL (ref 78–100)
PLATELET # BLD AUTO: 281 10E9/L (ref 150–450)
POTASSIUM SERPL-SCNC: 3.7 MMOL/L (ref 3.4–5.3)
PROT SERPL-MCNC: 6.3 G/DL (ref 6.8–8.8)
RBC # BLD AUTO: 4.44 10E12/L (ref 3.8–5.2)
SODIUM SERPL-SCNC: 138 MMOL/L (ref 133–144)
WBC # BLD AUTO: 13 10E9/L (ref 4–11)

## 2018-10-16 PROCEDURE — 94660 CPAP INITIATION&MGMT: CPT

## 2018-10-16 PROCEDURE — 40000275 ZZH STATISTIC RCP TIME EA 10 MIN

## 2018-10-16 PROCEDURE — 36415 COLL VENOUS BLD VENIPUNCTURE: CPT | Performed by: STUDENT IN AN ORGANIZED HEALTH CARE EDUCATION/TRAINING PROGRAM

## 2018-10-16 PROCEDURE — 80076 HEPATIC FUNCTION PANEL: CPT | Performed by: STUDENT IN AN ORGANIZED HEALTH CARE EDUCATION/TRAINING PROGRAM

## 2018-10-16 PROCEDURE — 80048 BASIC METABOLIC PNL TOTAL CA: CPT | Performed by: STUDENT IN AN ORGANIZED HEALTH CARE EDUCATION/TRAINING PROGRAM

## 2018-10-16 PROCEDURE — 25000132 ZZH RX MED GY IP 250 OP 250 PS 637: Performed by: SURGERY

## 2018-10-16 PROCEDURE — 85027 COMPLETE CBC AUTOMATED: CPT | Performed by: STUDENT IN AN ORGANIZED HEALTH CARE EDUCATION/TRAINING PROGRAM

## 2018-10-16 RX ORDER — ACETAMINOPHEN 325 MG/1
650 TABLET ORAL EVERY 6 HOURS PRN
Qty: 100 TABLET | Refills: 0 | Status: SHIPPED | OUTPATIENT
Start: 2018-10-16 | End: 2018-11-19

## 2018-10-16 RX ADMIN — LORATADINE 10 MG: 10 TABLET ORAL at 08:17

## 2018-10-16 RX ADMIN — HYDROCHLOROTHIAZIDE 25 MG: 25 TABLET ORAL at 08:16

## 2018-10-16 ASSESSMENT — ACTIVITIES OF DAILY LIVING (ADL)
ADLS_ACUITY_SCORE: 12

## 2018-10-16 ASSESSMENT — PAIN DESCRIPTION - DESCRIPTORS: DESCRIPTORS: SORE

## 2018-10-16 NOTE — PLAN OF CARE
"Problem: Patient Care Overview  Goal: Plan of Care/Patient Progress Review  Outcome: Improving  Afeb, vitals stable, 02 sats 96% on C-pap, states pain is \"OK\",declined scheduled tylenol, abd lap sites dry, intact and steri stripped, abd drsg dry and intact, belly round, soft with positive bowel sounds, JOHN to bulb suction scant out, HO dc'ed JOHN this am, up to bathroom, voiding not saving, on reg diet, up walking in halls, offers no further c/o, will be dc'ed later today      "

## 2018-10-16 NOTE — PLAN OF CARE
Problem: Patient Care Overview  Goal: Plan of Care/Patient Progress Review  Outcome: Improving  Temp: 96.9  F (36.1  C) Temp src: Oral BP: 157/71 Pulse: 72 Heart Rate: 90 Resp: 18 SpO2: 93 %   AVSS on RA during the shift running 94%, mild pain scheduled tylenol was effective. Denies n/v. Lap sites kyara with steri strips, transverse abd incision covered with dry dressing and cdi. Left JOHN dressing cdi with scant output. No IV access at this time, pt IV started to leak and was removed. Vascular Access stated if she wasn't on any fluids or IV medications she didn't need access. Team aware. On regular diet but states she doesn't have much of an appetite. Up ad fabio to the bathroom, voiding adequately but not saving.  +BS, +flatus, and+bm this shift, held bowel meds due to loose stool. Pt desated while sleeping but didn't want to wear O2. Agreed to try the CPAP, RT was called to set up CPAP system. Resting between cares.

## 2018-10-17 ENCOUNTER — CARE COORDINATION (OUTPATIENT)
Dept: GASTROENTEROLOGY | Facility: CLINIC | Age: 63
End: 2018-10-17

## 2018-10-17 ENCOUNTER — TELEPHONE (OUTPATIENT)
Dept: FAMILY MEDICINE | Facility: CLINIC | Age: 63
End: 2018-10-17

## 2018-10-17 ENCOUNTER — CARE COORDINATION (OUTPATIENT)
Dept: SURGERY | Facility: CLINIC | Age: 63
End: 2018-10-17

## 2018-10-17 DIAGNOSIS — Z90.49 S/P CHOLECYSTECTOMY: Primary | ICD-10-CM

## 2018-10-17 NOTE — PROGRESS NOTES
RN Post-Op/Post-Discharge Care Coordination Note    Ms. Mralin Harman is a 62 year old female who underwent Laparoscopic converted to open cholecystectomy on 10/10 with  Dr. Alex Rendon.  Spoke with Patient.    Support  Patient able to care for self independently     Health Status  Fevers/chills: Patient denies any fever or chills.  Nausea/Vomiting: Patient reports a lack of appetite.  Eating/drinking: Patient is able to eat and drink without any complaints.  Bowel habits: Patient reports having a normal bowel movement.  Drains (JOHN): N/A. Her drain was removed before discharge. She is changing the site dressing every other day.  Incisions: Patient denies any signs and symptoms of infection..  Wound closure:  Staples. Patient states there is a small 1/2 inch area of her incision that is draining a red/yellow drainage. Discussed with patient she can cover this area with gauze and change the dressing prn.  Pain: she is using Tylenol for pain. Discussed she can alternate between Tylenol and Ibuprofen Q6H prn.  New Medications:  Tylenol    Activity/Restrictions  No lifting in excess of 15-20 pounds for 3-4 weeks    Equipment  None    Pathology reviewed with patient:  No: not reviewed    Forms/Letters  No    All of her questions were answered including reviewing restrictions, and wound care.  She will call this office if she has any further questions and/or concerns.      In lieu of a post-op clinic patient that patient would like to be contacted in approximately 10 days via telephone. Patient would prefer to follow up locally instead of coming to the . She is aware she needs her staples removed around 10/24. Labs have been placed and she will have these done at a [facility] closer to home.    Whom and When to Call  Patient acknowledges understanding of how to manage any medication changes and   when to seek medical care.     Patient advised that if after hour medical concerns arise to please call [phone]  and choose option 4 to speak to the physician on call.     A copy of this note was routed to the primary surgeon.

## 2018-10-17 NOTE — PROGRESS NOTES
[facility] RN Care Coordination Note:    Called and spoke with patient. Informed her of ERCP info and plan for stent removal. Informed her the prep instructions have been mailed and to call us with any question. She verbalized understanding and has no further questions.     Ileana Steele RN   Care Coordinator   [phone]

## 2018-10-22 ENCOUNTER — OFFICE VISIT (OUTPATIENT)
Dept: FAMILY MEDICINE | Facility: CLINIC | Age: 63
End: 2018-10-22
Payer: COMMERCIAL

## 2018-10-22 ENCOUNTER — TELEPHONE (OUTPATIENT)
Dept: FAMILY MEDICINE | Facility: CLINIC | Age: 63
End: 2018-10-22

## 2018-10-22 VITALS
BODY MASS INDEX: 39.95 KG/M2 | TEMPERATURE: 97.9 F | RESPIRATION RATE: 18 BRPM | WEIGHT: 234 LBS | OXYGEN SATURATION: 97 % | DIASTOLIC BLOOD PRESSURE: 70 MMHG | SYSTOLIC BLOOD PRESSURE: 132 MMHG | HEIGHT: 64 IN | HEART RATE: 70 BPM

## 2018-10-22 DIAGNOSIS — Z90.49 S/P CHOLECYSTECTOMY: Primary | ICD-10-CM

## 2018-10-22 DIAGNOSIS — Z87.19 HISTORY OF GALLSTONES: ICD-10-CM

## 2018-10-22 LAB
ALBUMIN SERPL-MCNC: 2.5 G/DL (ref 3.4–5)
ALP SERPL-CCNC: 96 U/L (ref 40–150)
ALT SERPL W P-5'-P-CCNC: 31 U/L (ref 0–50)
AST SERPL W P-5'-P-CCNC: 20 U/L (ref 0–45)
BILIRUB DIRECT SERPL-MCNC: 0.2 MG/DL (ref 0–0.2)
BILIRUB SERPL-MCNC: 0.3 MG/DL (ref 0.2–1.3)
ERYTHROCYTE [DISTWIDTH] IN BLOOD BY AUTOMATED COUNT: 13.7 % (ref 10–15)
HCT VFR BLD AUTO: 40.3 % (ref 35–47)
HGB BLD-MCNC: 13.3 G/DL (ref 11.7–15.7)
MCH RBC QN AUTO: 30.9 PG (ref 26.5–33)
MCHC RBC AUTO-ENTMCNC: 33 G/DL (ref 31.5–36.5)
MCV RBC AUTO: 94 FL (ref 78–100)
PLATELET # BLD AUTO: 341 10E9/L (ref 150–450)
PROT SERPL-MCNC: 6.4 G/DL (ref 6.8–8.8)
RBC # BLD AUTO: 4.31 10E12/L (ref 3.8–5.2)
WBC # BLD AUTO: 15.6 10E9/L (ref 4–11)

## 2018-10-22 PROCEDURE — 99495 TRANSJ CARE MGMT MOD F2F 14D: CPT | Performed by: FAMILY MEDICINE

## 2018-10-22 PROCEDURE — 80076 HEPATIC FUNCTION PANEL: CPT | Performed by: FAMILY MEDICINE

## 2018-10-22 PROCEDURE — 36415 COLL VENOUS BLD VENIPUNCTURE: CPT | Performed by: FAMILY MEDICINE

## 2018-10-22 PROCEDURE — 85027 COMPLETE CBC AUTOMATED: CPT | Performed by: FAMILY MEDICINE

## 2018-10-22 RX ORDER — CYCLOBENZAPRINE HCL 10 MG
10 TABLET ORAL AT BEDTIME
Qty: 30 TABLET | Refills: 1 | Status: SHIPPED | OUTPATIENT
Start: 2018-10-22 | End: 2019-04-25

## 2018-10-22 NOTE — TELEPHONE ENCOUNTER
Yessi is calling. She is a nurse  with [employer] and is calling to let Dr. Castro know that she will be working with [facility] giving her education and support. If you have an questions or concerns for Yessi, she can be reached at [phone].    Tamar [facility]

## 2018-10-22 NOTE — PROGRESS NOTES
SUBJECTIVE:   Marlin Harman is a 62 year old female who presents to clinic today for the following health issues:          Hospital Follow-up Visit:    Hospital/Nursing Home/IP Rehab Facility: [facility]  Date of Admission: 10/10/18  Date of Discharge: 10/16/18  Reason(s) for Admission: S/p lap converted to open cholecystectomy c/b right hepatic duct injury repaired intraoperatively now with cbd stent.    BOWELS ARE STILL MESSED UP SINCE SURGERY. DOESN'T THINK HER NERVE BLOCK HAS GONE AWAY TOTALLY YET. NOT SLEEPING VERY WELL YET.   HAS STAPLES- THINKS THEY NEED TO COME OUT? STILL HAVING SOME SEEPING IN THE MAJOR INCISION. STUFF RUBS ON IT AND ARE CAUSING SOME IRRITATION. THE SMALL INCISIONS ARE JUST FINE.             Problems taking medications regularly:  None       Medication changes since discharge: None       Problems adhering to non-medication therapy:  None    Summary of hospitalization:  [facility] discharge summary reviewed  Diagnostic Tests/Treatments reviewed.  Follow up needed: labs  Other Healthcare Providers Involved in Patient s Care:         None  Update since discharge: as above     Post Discharge Medication Reconciliation: discharge medications reconciled, continue medications without change.  Plan of care communicated with patient     Coding guidelines for this visit:  Type of Medical   Decision Making Face-to-Face Visit       within 7 Days of discharge Face-to-Face Visit        within 14 days of discharge   Moderate Complexity 11839 [id number]   High Complexity 18101 04214                Problem list and histories reviewed & adjusted, as indicated.  Additional history: as documented    Patient Active Problem List   Diagnosis     Chronic seasonal allergic rhinitis due to pollen     Morbid obesity with BMI of 40.0-44.9, adult (H)     Benign essential hypertension     Tobacco use     Pap smear of cervix declined     Colonoscopy refused     Mammogram declined     Abnormal  results of liver function studies     Scleral icterus     Obstruction of bile duct     S/P cholecystectomy     Colon injury     Past Surgical History:   Procedure Laterality Date     CHOLECYSTECTOMY N/A 10/10/2018    Procedure: CHOLECYSTECTOMY;;  Surgeon: lAex Rendon MD;  Location: [facility] OR     ENDOSCOPIC RETROGRADE CHOLANGIOPANCREATOGRAM N/A 8/22/2018    Procedure: COMBINED ENDOSCOPIC RETROGRADE CHOLANGIOPANCREATOGRAPHY, PLACE TUBE/STENT;  Endoscopic Retrograde Cholangiopancreatogram with spinchterotomy, bile duct and pancreatic ducts stents placement;  Surgeon: Manny Cooper MD;  Location: [facility] OR     ENDOSCOPIC RETROGRADE CHOLANGIOPANCREATOGRAM N/A 9/13/2018    Procedure: ENDOSCOPIC RETROGRADE CHOLANGIOPANCREATOGRAM;  Endoscopic Retrograde Cholangiopancreatogram, with Stent Removal, Spyglass Cholangioscopy with Electro [place] Liptotripsy;  Surgeon: Manny Cooper MD;  Location: [facility] OR     ENDOSCOPIC RETROGRADE CHOLANGIOPANCREATOGRAM N/A 10/10/2018    Procedure: COMBINED ENDOSCOPIC RETROGRADE CHOLANGIOPANCREATOGRAPHY, PLACE TUBE/STENT;;  Surgeon: Amish Mckeon MD;  Location: [facility] OR     LAPAROSCOPIC CHOLECYSTECTOMY N/A 10/10/2018    Procedure: LAPAROSCOPIC CHOLECYSTECTOMY;  Attempted Laparoscopic Cholecystectomy Converted to Open Cholecystectomy, Intraoperative cholagiogram x2, repair of colon injury, endoscopic retrograde cholangiogram with biliary stent insertion.;  Surgeon: Alex Rendon MD;  Location:  OR       Social History   Substance Use Topics     Smoking status: Light Tobacco Smoker     Types: Cigarettes     Smokeless tobacco: Never Used      Comment: 4-6 cigs/day, smoked since age 18, at most 2 packs per day     Alcohol use Yes      Comment: rare      Family History   Problem Relation Age of Onset     Influenza/Pneumonia Mother      Substance Abuse Mother      Thyroid Disease Mother      Rheumatoid Arthritis Mother      Substance Abuse Father      Myocardial Infarction  Father      Thyroid Disease Sister      Osteoporosis Sister          Current Outpatient Prescriptions   Medication Sig Dispense Refill     acetaminophen (TYLENOL) 325 MG tablet Take 2 tablets (650 mg) by mouth every 6 hours as needed for mild pain 100 tablet 0     albuterol (PROAIR HFA/PROVENTIL HFA/VENTOLIN HFA) 108 (90 Base) MCG/ACT inhaler Inhale 2 puffs into the lungs every 6 hours as needed for shortness of breath / dyspnea or wheezing 1 Inhaler 1     aspirin 81 MG chewable tablet Take 81 mg by mouth daily       Cranberry 250 MG CAPS Take by mouth daily        cyclobenzaprine (FLEXERIL) 10 MG tablet Take 1 tablet (10 mg) by mouth At Bedtime 4 tablet 0     diphenhydrAMINE (BENADRYL) 25 MG tablet Take 25 mg by mouth nightly as needed for itching or allergies       folic acid (FOLVITE) 1 MG tablet Take 1 mg by mouth daily       Glucosamine Sulfate 500 MG TABS Take 1 tablet by mouth daily       hydrochlorothiazide (HYDRODIURIL) 25 MG tablet Take 1 tablet (25 mg) by mouth daily 14 tablet 0     loratadine (CLARITIN) 10 MG tablet Take 10 mg by mouth daily       vitamin B complex with vitamin C (VITAMIN  B COMPLEX) TABS tablet Take 1 tablet by mouth daily       vitamin E 400 UNIT capsule Take 400 Units by mouth daily       Allergies   Allergen Reactions     Ciprofloxacin      body stiffness      Citrus Dermatitis     Levaquin [Levofloxacin]      body stiffness      Mold      Seafood Nausea and Vomiting     Scallops only, all other seafood ok     Small Pox Vaccine      Sulfa Drugs      Latex Itching and Rash     Recent Labs   Lab Test  10/16/18   0646  10/15/18   0656   10/14/18   1036   08/02/18   1213 04/16/18   A1C   --    --    --    --    --   5.6   --    ALT  52*  55*   --   52*   < >  161*   --    CR  0.63  0.65   --   0.70   < >  0.50*   --    GFRESTIMATED  >90  >90   --   84   < >  >90   --    GFRESTBLACK  >90  >90   --   >90   < >  >90   --    POTASSIUM  3.7  3.9   < >  3.1*   < >  3.0*   --    TSH   --    --   "  --    --    --   0.98  3.360    < > = values in this interval not displayed.      BP Readings from Last 3 Encounters:   10/22/18 132/70   10/16/18 147/74   10/03/18 135/85    Wt Readings from Last 3 Encounters:   10/22/18 234 lb (106.1 kg)   10/10/18 110 lb 14.4 oz (50.3 kg)   10/03/18 239 lb (108.4 kg)                  Labs reviewed in EPIC    Reviewed and updated as needed this visit by clinical staff       Reviewed and updated as needed this visit by Provider         ROS:  Constitutional, HEENT, cardiovascular, pulmonary, GI, , musculoskeletal, neuro, skin, endocrine and psych systems are negative, except as otherwise noted.    OBJECTIVE:     /70 (Cuff Size: Adult Regular)  Pulse 70  Temp 97.9  F (36.6  C) (Tympanic)  Resp 18  Ht 5' 4\" (1.626 m)  Wt 234 lb (106.1 kg)  SpO2 97%  Breastfeeding? No  BMI 40.17 kg/m2  Body mass index is 40.17 kg/(m^2).  GENERAL: alert, no distress and obese  EYES: Eyes grossly normal to inspection, PERRL and conjunctivae and sclerae normal  NECK: no adenopathy, no asymmetry, masses, or scars and thyroid normal to palpation  RESP: lungs clear to auscultation - no rales, rhonchi or wheezes  CV: regular rates and rhythm, normal S1 S2, no S3 or S4 and no murmur, click or rub  ABDOMEN: soft, nontender, bowel sounds normal, slight erythema around lateral incisions, no significant discharge or warmth noted, 14 staples removed   MS: no gross musculoskeletal defects noted, no edema  NEURO: Normal strength and tone, mentation intact and speech normal  PSYCH: mentation appears normal, affect normal/bright            ASSESSMENT/PLAN:         ICD-10-CM    1. S/P cholecystectomy Z90.49 Hepatic panel (Albumin, ALT, AST, Bili, Alk Phos, TP)     CBC with platelets     cyclobenzaprine (FLEXERIL) 10 MG tablet   2. History of gallstones Z87.19        14 staples removed in office today, no complication encountered.  No significant incisional drainage or tenderness noted.  Patient has " been able to move bowels, denies any nausea, vomiting, fever, chills or other relevant systemic symptoms.  Patient has ERCP schedule for November 29, 2018.  Flexeril refilled which she is taking for muscle spasms, other medications reviewed and no changes made.  Suggested to continue well hydration, regular walks and avoid fatty meals.  Follow-up in 10-14 days or earlier if needed.  Patient understood and in agreement with above plan.  All questions answered.      Zechariah Castro MD  [facility]

## 2018-10-22 NOTE — MR AVS SNAPSHOT
"              After Visit Summary   10/22/2018    Marlin Harman    MRN: [id number]           Patient Information     Date Of Birth          1955        Visit Information        Provider Department      10/22/2018 10:00 AM Zechariah Castro MD [facility]        Today's Diagnoses     S/P cholecystectomy    -  1    History of gallstones           Follow-ups after your visit        Follow-up notes from your care team     Return in about 10 days (around 11/1/2018).      Your next 10 appointments already scheduled     Nov 29, 2018   Procedure with Manny Cooper MD   [facility], Same Day Surgery (--)    [address]  [facility] [place] [address] [phone]              Who to contact     If you have questions or need follow up information about today's clinic visit or your schedule please contact [facility] directly at [phone].  Normal or non-critical lab and imaging results will be communicated to you by MyChart, letter or phone within 4 business days after the clinic has received the results. If you do not hear from us within 7 days, please contact the clinic through MyChart or phone. If you have a critical or abnormal lab result, we will notify you by phone as soon as possible.  Submit refill requests through [employer] or call your pharmacy and they will forward the refill request to us. Please allow 3 business days for your refill to be completed.          Additional Information About Your Visit        Care EveryWhere ID     This is your Care EveryWhere ID. This could be used by other organizations to access your [place] medical records  [id number]        Your Vitals Were     Pulse Temperature Respirations Height Pulse Oximetry Breastfeeding?    70 97.9  F (36.6  C) (Tympanic) 18 5' 4\" (1.626 m) 97% No    BMI (Body Mass Index)                   40.17 kg/m2            Blood Pressure from Last 3 Encounters:   10/22/18 132/70   10/16/18 147/74   10/03/18 135/85    Weight from " Last 3 Encounters:   10/22/18 234 lb (106.1 kg)   10/10/18 110 lb 14.4 oz (50.3 kg)   10/03/18 239 lb (108.4 kg)              We Performed the Following     CBC with platelets     Hepatic panel (Albumin, ALT, AST, Bili, Alk Phos, TP)          Where to get your medicines      These medications [id number]  [address] , [place] [facility] [place] [address]     Phone:  [phone]     cyclobenzaprine 10 MG tablet          Primary Care Provider Office Phone # Fax #    Zechariah Padilla MD [facility] [phone]       [address] [facility] [address]        Equal Access to Services     TIARA MARX : Donnell goldbergo Sochan, waaxda luqadaha, qaybta kaalmada adeegyada, paulo alvarez. So [facility] [phone].    ATENCIÓN: Si habla español, tiene a lopez disposición servicios gratuitos de asistencia lingüística. [facility] [phone].    We comply with applicable federal civil rights laws and [place] laws. We do not discriminate on the basis of race, color, national origin, age, disability, sex, sexual orientation, or gender identity.            Thank you!     Thank you for choosing [facility]  for your care. Our goal is always to provide you with excellent care. Hearing back from our patients is one way we can continue to improve our services. Please take a few minutes to complete the written survey that you may receive in the mail after your visit with us. Thank you!             Your Updated Medication List - Protect others around you: Learn how to safely use, store and throw away your medicines at www.disposemymeds.org.          This list is accurate as of 10/22/18 10:55 AM.  Always use your most recent med list.                   Brand Name Dispense Instructions for use Diagnosis    acetaminophen 325 MG tablet    TYLENOL    100 tablet    Take 2 tablets (650 mg) by mouth every 6 hours as needed for mild pain    S/P  cholecystectomy       albuterol 108 (90 Base) MCG/ACT inhaler    PROAIR HFA/PROVENTIL HFA/VENTOLIN HFA    1 Inhaler    Inhale 2 puffs into the lungs every 6 hours as needed for shortness of breath / dyspnea or wheezing    Chronic seasonal allergic rhinitis due to pollen       aspirin 81 MG chewable tablet      Take 81 mg by mouth daily        Cranberry 250 MG Caps      Take by mouth daily        cyclobenzaprine 10 MG tablet    FLEXERIL    30 tablet    Take 1 tablet (10 mg) by mouth At Bedtime    S/P cholecystectomy       diphenhydrAMINE 25 MG tablet    BENADRYL     Take 25 mg by mouth nightly as needed for itching or allergies        folic acid 1 MG tablet    FOLVITE     Take 1 mg by mouth daily        Glucosamine Sulfate 500 MG Tabs      Take 1 tablet by mouth daily        hydrochlorothiazide 25 MG tablet    HYDRODIURIL    14 tablet    Take 1 tablet (25 mg) by mouth daily    Benign essential hypertension       loratadine 10 MG tablet    CLARITIN     Take 10 mg by mouth daily        vitamin B complex with vitamin C Tabs tablet      Take 1 tablet by mouth daily        vitamin E 400 UNIT capsule      Take 400 Units by mouth daily

## 2018-10-22 NOTE — NURSING NOTE
"Chief Complaint   Patient presents with     Hospital F/U       Initial /70 (Cuff Size: Adult Regular)  Pulse 70  Temp 97.9  F (36.6  C) (Tympanic)  Resp 18  Ht 5' 4\" (1.626 m)  Wt 234 lb (106.1 kg)  SpO2 97%  Breastfeeding? No  BMI 40.17 kg/m2 Estimated body mass index is 40.17 kg/(m^2) as calculated from the following:    Height as of this encounter: 5' 4\" (1.626 m).    Weight as of this encounter: 234 lb (106.1 kg).    Patient presents to the clinic using No DME    Health Maintenance that is potentially due pending provider review:  Mammogram and Pap Smear    Pt declines to have.    Is there anyone who you would like to be able to receive your results? Not Applicable  If yes have patient fill out LEONA    "

## 2018-10-26 ENCOUNTER — CARE COORDINATION (OUTPATIENT)
Dept: SURGERY | Facility: CLINIC | Age: 63
End: 2018-10-26

## 2018-10-26 NOTE — PROGRESS NOTES
Naga Harman is a patient of Dr. Zuleima Vasquez that recently underwent a surgical procedure.  The patient was contacted via telephone approximately 10 days ago for a status update and post-op teaching.  In lieu of a clinic visit, that patient requested to be contacted at a later date by an RN for an assessment.    Attempted to contact patient via telephone for a status update.  LM on VM to call office.        Patient Name: NAGA HARMAN   MR#: [id number]   Specimen #: [id number]   Collected: 10/10/2018   Received: 10/10/2018   Reported: 10/15/2018 16:29   Ordering Phy(s): ZULEIMA VASQUEZ   Additional Phy(s): ZULEIMA VASQUEZ     For improved result formatting, select 'View Enhanced Report Format' under    Linked Documents section.     SPECIMEN(S):   A: Colonic injury   B: Stone, gallbladder   C: Gallbladder     FINAL DIAGNOSIS:   A. COLON, INJURY, SEGMENTAL RESECTION:   - Colonic segment with no significant histologic abnormality     B. GALLBLADDER STONE FOR GROSS ONLY:   - Please, see gross description     C. GALLBLADDER, CHOLECYSTECTOMY:   - Chronic cholecystitis     I have personally reviewed all specimens and/or slides, including the   listed special stains, and used them   with my medical judgement to determine or confirm the final diagnosis.     Electronically signed out by:     Emily Sevilla M.D., PhD, Physicians

## 2018-10-30 NOTE — PROGRESS NOTES
Marlin Harman was contacted several days post procedure via telephone for a status update and elected to have a telephone follow -up call approximately 10 days after original contact in lieu of a clinic visit with Dr. Alex Rendon.  She continues to do well and the staples have been removed.  The patients wounds are healing well. She states she is still having some weeping from her bigger incision. She received instructions at her clinic near home to use Bacitracin and cover the wound. She is changing the dressing Q6H and when saturated. The drainage is a red/yellow color with no odor. Discussed with patient that she should continue to clean the wound with soap and water and to try and avoid using Bacitracin if possible.  She is afebrile, having normal bowel movements, and rabia po; the patient is slowly resuming her normal activity. She has minimal pain and will use Ibuprofen prn.    Pathology was reviewed with the patient: Yes: reviewed    All of Marlin Harman question's were answered and  she would like to return to the clinic on a PRN basis.      A copy of this note was routed to his surgeon.

## 2018-10-31 ENCOUNTER — TELEPHONE (OUTPATIENT)
Dept: FAMILY MEDICINE | Facility: CLINIC | Age: 63
End: 2018-10-31

## 2018-10-31 NOTE — TELEPHONE ENCOUNTER
Looks like she has a care coordination team. Can you please check in with her? Looks like she has surgery scheduled soon, will need insurance for this

## 2018-10-31 NOTE — TELEPHONE ENCOUNTER
Reason for Call:  Other     Detailed comments: Yessi -  with [employer] [facility] - [phone] and she was given info on changing her Insurance a while back and a  with the [facility] to contact and patient has not done any of it.  Asa of today the patient's [employer] [place] expires - FYI -     Phone Number Patient can be reached at:     Best Time:     Can we leave a detailed message on this number?     Call taken on 10/31/2018 at 12:29 PM by Lilo Plascencia

## 2018-11-01 ENCOUNTER — OFFICE VISIT (OUTPATIENT)
Dept: FAMILY MEDICINE | Facility: CLINIC | Age: 63
End: 2018-11-01
Payer: MEDICARE

## 2018-11-01 ENCOUNTER — PATIENT OUTREACH (OUTPATIENT)
Dept: CARE COORDINATION | Facility: CLINIC | Age: 63
End: 2018-11-01

## 2018-11-01 VITALS
TEMPERATURE: 97.9 F | RESPIRATION RATE: 18 BRPM | WEIGHT: 234 LBS | SYSTOLIC BLOOD PRESSURE: 140 MMHG | DIASTOLIC BLOOD PRESSURE: 72 MMHG | BODY MASS INDEX: 39.95 KG/M2 | HEIGHT: 64 IN | HEART RATE: 68 BPM

## 2018-11-01 DIAGNOSIS — Z12.4 CERVICAL CANCER SCREENING: ICD-10-CM

## 2018-11-01 DIAGNOSIS — Z90.49 S/P CHOLECYSTECTOMY: Primary | ICD-10-CM

## 2018-11-01 DIAGNOSIS — T81.30XA WOUND DEHISCENCE: ICD-10-CM

## 2018-11-01 DIAGNOSIS — Z12.11 SCREEN FOR COLON CANCER: ICD-10-CM

## 2018-11-01 PROCEDURE — 99213 OFFICE O/P EST LOW 20 MIN: CPT | Performed by: FAMILY MEDICINE

## 2018-11-01 PROCEDURE — G0145 SCR C/V CYTO,THINLAYER,RESCR: HCPCS | Performed by: FAMILY MEDICINE

## 2018-11-01 PROCEDURE — G0476 HPV COMBO ASSAY CA SCREEN: HCPCS | Performed by: FAMILY MEDICINE

## 2018-11-01 PROCEDURE — 87070 CULTURE OTHR SPECIMN AEROBIC: CPT | Performed by: FAMILY MEDICINE

## 2018-11-01 RX ORDER — CLINDAMYCIN HCL 300 MG
300 CAPSULE ORAL 4 TIMES DAILY
Qty: 40 CAPSULE | Refills: 0 | Status: SHIPPED | OUTPATIENT
Start: 2018-11-01 | End: 2018-11-19

## 2018-11-01 ASSESSMENT — ACTIVITIES OF DAILY LIVING (ADL): DEPENDENT_IADLS:: INDEPENDENT

## 2018-11-01 NOTE — LETTER
[place] CARE COORDINATION - [facility]  [address] [place] [address]  [phone]    November 1, 2018    Marlin Harman  [address] [place] [address]      Dear Marlin,    I am a clinic care coordinator who works with Zechariah Castro MD at [place]. I wanted to thank you for spending the time to talk with me.  I wanted to introduce myself and provide you with my contact information so that you can call me with questions or concerns about your health care. Below is a description of clinic care coordination and how I can further assist you.     The clinic care coordinator is a registered nurse and/or  who understand the health care system. The goal of clinic care coordination is to help you manage your health and improve access to the [facility] system in the most efficient manner. The registered nurse can assist you in meeting your health care goals by providing education, coordinating services, and strengthening the communication among your providers. The  can assist you with financial, behavioral, psychosocial, chemical dependency, counseling, and/or psychiatric resources.    Please feel free to contact me at [phone], with any questions or concerns. We at [facility] are focused on providing you with the highest-quality healthcare experience possible and that all starts with you.     Sincerely,     Catrachita Ha    Enclosed: I have enclosed a copy of the Complex Care Plan. This has helpful information and goals that we have talked about. Please keep this in an easy to access place to use as needed.

## 2018-11-01 NOTE — PATIENT INSTRUCTIONS
Wound Check After Surgery: Infection  Your surgical wound has become infected. Infection after surgery usually involves just the top layers of skin. Sometimes the infection is deeper in the wound and may involve a collection of fluid or pus. Treatment will depend on the type of infection you have.  Once antibiotic treatment is started, the area of redness should not increase. Pain should start to decrease after 2 days of treatment.  Home care  Different types of surgery require different types of care and dressing changes. It is important to follow all instructions and advice from your surgeon, as well as other members of your healthcare team.  Wound care    Keep the wound clean, as directed by your healthcare provider.    Change the dressing as directed. Change the dressing sooner if it becomes wet or stained with blood or fluid from the wound.    Bathe with a sponge (no shower or tub baths) for the first few days, or until there is no more drainage from the wound. Unless your surgeon gave you different instructions, you can then shower. Don't soak the area in water (no baths or swimming) until the tape, sutures, or staples are removed and any wound opening has dried out and healed.    If you smoke, stop smoking. Ask your doctor about ways to quit.  Changing the dressing    Wash your hands with plain soap and water before changing the dressings. Or use an alcohol-based hand .    Carefully remove the dressing and tape. Don t just yank it off. If it sticks to the wound, you may need to wet it a little to remove it, unless your healthcare provider told you not to wet it.    Wash your hands again before putting on a new, clean dressing.    Gently clean the wound with clean water (or saline) using gauze or a clean washcloth. Don't rub it or pick at it.    Don't use soap, alcohol, hydrogen peroxide, or any other cleanser.    If you were told to dry the wound before putting on a new dressing, gently pat it dry.  Don't rub.    Throw out the old dressing. Don't reuse it!    Wash your hands again when you are done.  Types of dressings  Your healthcare team will tell you what type of dressing to put on your wound. Follow your healthcare team s instructions carefully, and contact them if you have any questions. Two common types of dressings are described below. You may have one of these or another type.    Dry dressing. Use dry gauze. If the wound is still draining, use a  nonadherent  dressing, which shouldn t stick to the wound.    Wet-to-dry dressing. Wet the gauze and squeeze out the extra water (or saline) before putting it on. Then cover this with a dry pad.  Medicines    If you were given antibiotics, take them until they are used up or your healthcare provider tells you to stop. It is important to finish the antibiotics even though you feel better, to make sure the infection has cleared.    You can take acetaminophen or ibuprofen for pain, unless you were given a different pain medicine to use. Talk with your doctor before using these medicines if you have chronic liver or kidney disease. Also talk with your doctor if you have ever had a stomach ulcer or digestive bleeding, or are taking blood-thinner medicines.    Aspirin should never be used in anyone under 18 years of age who is ill with a fever. It may cause severe liver damage.  Follow-up care  Follow up with your healthcare provider for your next wound check or to remove your sutures, staples, or tape.    If a culture was done, you will be told if the results will affect your treatment. You can call as directed for the results.    If imaging tests, such as X-rays, an ultrasound, or CT scan were done, they will be looked at by a specialist. You will be told of the results, especially if they affect treatment.  Call 911  Call 911 if any of these occur:    Trouble breathing or swallowing, wheezing    Hoarse voice or trouble speaking    Extreme confusion    Extreme  drowsiness or trouble awakening    Fainting or loss of consciousness    Rapid heart rate or very slow heart rate    Vomiting blood, or large amounts of blood in stool    Discomfort in the center of the chest that feels like pressure, squeezing, a sense of fullness, or pain    Discomfort or pain in other upper body areas, such as the back, one or both arms, neck, jaw, or stomach    Stroke symptoms (spot a stroke  FAST ):  ? F: Face drooping. One side of the face is numb or droops.  ? A: Arm weakness. One arm feels weak or numb.  ? S: Speech difficulty: Speech is slurred, or you can't speak.  ? T: Time to call 911. Even if symptoms go away, call 911.  When to seek medical advice  Call your healthcare provider right away if any of these occur:    Increasing pain at the site of surgery    Pain not controlled by medicine prescribed    Fever of 100.4 F (38 C) or higher, or as directed by your healthcare provider    Increasing redness around the wound    Fluid, pus, or blood that continues to drain from the wound after 5 days of treatment    Vomiting, constipation, or diarrhea  Date Last Reviewed: 9/27/2015 2000-2017 [employer]. [address] [place] [address], [place] [address]. All rights reserved. This information is not intended as a substitute for professional medical care. Always follow your healthcare professional's instructions.

## 2018-11-01 NOTE — LETTER
[facility] Home  Complex Care Plan  About Me  Patient Name:  Marlin Harman    YOB: 1955  Age:     62 year old   [place] MRN:   [id number] Telephone Information:    Home Phone [phone]   Mobile [phone]       Address:    [address] [place] [address] Email address:  No e-mail address on record      Emergency Contact(s)  Name Relationship Lgl Grd Work Phone Home Phone Mobile Phone   1. CHELSEY GREGG* Sister No [phone]   2. SUSANA, FISH Friend    [phone]           Primary language:  English     needed? No   [facility] [employer] [facility]:  [phone] op. 1  Other communication barriers: None  Preferred Method of Communication:     Current living arrangement: I live in a private home with family (potentially changing in the future)  Mobility Status/ Medical Equipment:      Health Maintenance  Health Maintenance Reviewed: Due/Overdue   Health Maintenance Due   Topic Date Due     TETANUS IMMUNIZATION (SYSTEM ASSIGNED)  12/01/1971     PAP SCREENING Q3 YR (SYSTEM ASSIGNED)  12/01/1974     LIPID SCREEN Q5 YR FEMALE (SYSTEM ASSIGNED)  12/01/1998     MAMMO SCREEN Q2 YR (SYSTEM ASSIGNED)  12/01/2003     COLON CANCER SCREEN (SYSTEM ASSIGNED)  12/01/2003     Please talk with your provider about what is needed or can continue to wait.      My Access Plan  Medical Emergency 911   Primary Clinic Line [facility] - [phone]   24 Hour Appointment Line [phone] or  [phone]) (toll-free)   24 Hour Nurse Line [phone] (toll-free)   Preferred Urgent Care     Preferred Hospital     Preferred Pharmacy [id number] - [place], [place] - [address] [place] [address]     Behavioral Health Crisis Line The National Suicide Prevention Lifeline at 1-475.897.9279 or 911     My Care Team Members    Patient Care Team       Relationship Specialty Notifications Start End    Zechariah Castro MD PCP - General Family  Practice  10/10/18     Phone: [phone] Fax: [phone]         [address] [facility] [address]    Catrachita Ha LSW Lead Care Coordinator Primary Care - [facility] Admissions 11/1/18     Phone: [phone] Fax: [phone]                My Care Plans  Self Management and Treatment Plan  Goals and (Comments)  Goals        General    Financial Wellbeing (pt-stated)     Notes - Note created  11/1/2018  2:00 PM by Catrachita Ha LSW    Goal Statement: I want to get my insurance all straightened out by 12-1-18  Measure of Success: I will have coverage and understand what is in place  Supportive Steps to Achieve: provided resources from   Barriers: being able to reach agencies who can support  Strengths: pt understanding the urgency and plan  Date to Achieve By: 12-1-18  Patient expressed understanding of goal: yes                 Action Plans on File:                       Advance Care Plans/Directives Type:        My Medical and Care Information  Problem List   Patient Active Problem List   Diagnosis     Chronic seasonal allergic rhinitis due to pollen     Morbid obesity with BMI of 40.0-44.9, adult (H)     Benign essential hypertension     Tobacco use     Pap smear of cervix declined     Colonoscopy refused     Mammogram declined     Abnormal results of liver function studies     Scleral icterus     Obstruction of bile duct     S/P cholecystectomy     Colon injury      Current Medications and Allergies:  See printed Medication Report.    Care Coordination Start Date: 11/1/2018   Frequency of Care Coordination: weekly   Form Last Updated: 11/01/2018

## 2018-11-01 NOTE — MR AVS SNAPSHOT
After Visit Summary   11/1/2018    Marlin Harman    MRN: [id number]           Patient Information     Date Of Birth          1955        Visit Information        Provider Department      11/1/2018 8:40 AM Zechariah Castro MD [facility]        Today's Diagnoses     S/P cholecystectomy    -  1    Wound dehiscence          Care Instructions      Wound Check After Surgery: Infection  Your surgical wound has become infected. Infection after surgery usually involves just the top layers of skin. Sometimes the infection is deeper in the wound and may involve a collection of fluid or pus. Treatment will depend on the type of infection you have.  Once antibiotic treatment is started, the area of redness should not increase. Pain should start to decrease after 2 days of treatment.  Home care  Different types of surgery require different types of care and dressing changes. It is important to follow all instructions and advice from your surgeon, as well as other members of your healthcare team.  Wound care    Keep the wound clean, as directed by your healthcare provider.    Change the dressing as directed. Change the dressing sooner if it becomes wet or stained with blood or fluid from the wound.    Bathe with a sponge (no shower or tub baths) for the first few days, or until there is no more drainage from the wound. Unless your surgeon gave you different instructions, you can then shower. Don't soak the area in water (no baths or swimming) until the tape, sutures, or staples are removed and any wound opening has dried out and healed.    If you smoke, stop smoking. Ask your doctor about ways to quit.  Changing the dressing    Wash your hands with plain soap and water before changing the dressings. Or use an alcohol-based hand .    Carefully remove the dressing and tape. Don t just yank it off. If it sticks to the wound, you may need to wet it a little to remove it, unless your  healthcare provider told you not to wet it.    Wash your hands again before putting on a new, clean dressing.    Gently clean the wound with clean water (or saline) using gauze or a clean washcloth. Don't rub it or pick at it.    Don't use soap, alcohol, hydrogen peroxide, or any other cleanser.    If you were told to dry the wound before putting on a new dressing, gently pat it dry. Don't rub.    Throw out the old dressing. Don't reuse it!    Wash your hands again when you are done.  Types of dressings  Your healthcare team will tell you what type of dressing to put on your wound. Follow your healthcare team s instructions carefully, and contact them if you have any questions. Two common types of dressings are described below. You may have one of these or another type.    Dry dressing. Use dry gauze. If the wound is still draining, use a  nonadherent  dressing, which shouldn t stick to the wound.    Wet-to-dry dressing. Wet the gauze and squeeze out the extra water (or saline) before putting it on. Then cover this with a dry pad.  Medicines    If you were given antibiotics, take them until they are used up or your healthcare provider tells you to stop. It is important to finish the antibiotics even though you feel better, to make sure the infection has cleared.    You can take acetaminophen or ibuprofen for pain, unless you were given a different pain medicine to use. Talk with your doctor before using these medicines if you have chronic liver or kidney disease. Also talk with your doctor if you have ever had a stomach ulcer or digestive bleeding, or are taking blood-thinner medicines.    Aspirin should never be used in anyone under 18 years of age who is ill with a fever. It may cause severe liver damage.  Follow-up care  Follow up with your healthcare provider for your next wound check or to remove your sutures, staples, or tape.    If a culture was done, you will be told if the results will affect your  treatment. You can call as directed for the results.    If imaging tests, such as X-rays, an ultrasound, or CT scan were done, they will be looked at by a specialist. You will be told of the results, especially if they affect treatment.  Call 911  Call 911 if any of these occur:    Trouble breathing or swallowing, wheezing    Hoarse voice or trouble speaking    Extreme confusion    Extreme drowsiness or trouble awakening    Fainting or loss of consciousness    Rapid heart rate or very slow heart rate    Vomiting blood, or large amounts of blood in stool    Discomfort in the center of the chest that feels like pressure, squeezing, a sense of fullness, or pain    Discomfort or pain in other upper body areas, such as the back, one or both arms, neck, jaw, or stomach    Stroke symptoms (spot a stroke  FAST ):  ? F: Face drooping. One side of the face is numb or droops.  ? A: Arm weakness. One arm feels weak or numb.  ? S: Speech difficulty: Speech is slurred, or you can't speak.  ? T: Time to call 911. Even if symptoms go away, call 911.  When to seek medical advice  Call your healthcare provider right away if any of these occur:    Increasing pain at the site of surgery    Pain not controlled by medicine prescribed    Fever of 100.4 F (38 C) or higher, or as directed by your healthcare provider    Increasing redness around the wound    Fluid, pus, or blood that continues to drain from the wound after 5 days of treatment    Vomiting, constipation, or diarrhea  Date Last Reviewed: 9/27/2015 2000-2017 [employer]. [address] [place] [address]. All rights reserved. This information is not intended as a substitute for professional medical care. Always follow your healthcare professional's instructions.                Follow-ups after your visit        Your next 10 appointments already scheduled     Nov 19, 2018  8:40 AM CST   Pre-Op physical with Zechariah Castro MD   [facility]  "([facility])    [address] [facility] [address]   [phone]            Nov 29, 2018   Procedure with Manny Cooper MD   [facility], [place], Same Day Surgery (--)    [address]  [place] [id number] [phone]              Who to contact     If you have questions or need follow up information about today's clinic visit or your schedule please contact [facility] directly at [phone].  Normal or non-critical lab and imaging results will be communicated to you by MyChart, letter or phone within 4 business days after the clinic has received the results. If you do not hear from us within 7 days, please contact the clinic through MyChart or phone. If you have a critical or abnormal lab result, we will notify you by phone as soon as possible.  Submit refill requests through [employer] or call your pharmacy and they will forward the refill request to us. Please allow 3 business days for your refill to be completed.          Additional Information About Your Visit        Care EveryWhere ID     This is your Care EveryWhere ID. This could be used by other organizations to access your [place] medical records  [id number]        Your Vitals Were     Pulse Temperature Respirations Height Breastfeeding? BMI (Body Mass Index)    68 97.9  F (36.6  C) (Tympanic) 18 5' 4\" (1.626 m) No 40.17 kg/m2       Blood Pressure from Last 3 Encounters:   11/01/18 140/72   10/22/18 132/70   10/16/18 147/74    Weight from Last 3 Encounters:   11/01/18 234 lb (106.1 kg)   10/22/18 234 lb (106.1 kg)   10/10/18 110 lb 14.4 oz (50.3 kg)              Today, you had the following     No orders found for display         Today's Medication Changes          These changes are accurate as of 11/1/18  9:17 AM.  If you have any questions, ask your nurse or doctor.               Start taking these medicines.        Dose/Directions    clindamycin 300 MG capsule   Commonly known as:  CLEOCIN   Used for:  Wound " dehiscence   Started by:  Zechariah Castro MD        Dose:  300 mg   Take 1 capsule (300 mg) by mouth 4 times daily   Quantity:  40 capsule   Refills:  0            Where to get your medicines      These medications were sent to [id number] - [place], [place] - [address]   [address] , [facility] [address]     Phone:  [phone]     clindamycin 300 MG capsule                Primary Care Provider Office Phone # Fax #    Zechariah Castro [phone]       [address] [facility] [address]        Equal Access to Services     [facility]: Hadii aad ku hadasho Soomaali, waaxda luqadaha, qaybta kaalmada adeegyada, paulo bland . So [facility] [phone].    ATENCIÓN: Si habla español, tiene a lopez disposición servicios gratuitos de asistencia lingüística. Llame al [phone].    We comply with applicable federal civil rights laws and [place] laws. We do not discriminate on the basis of race, color, national origin, age, disability, sex, sexual orientation, or gender identity.            Thank you!     Thank you for choosing [facility]  for your care. Our goal is always to provide you with excellent care. Hearing back from our patients is one way we can continue to improve our services. Please take a few minutes to complete the written survey that you may receive in the mail after your visit with us. Thank you!             Your Updated Medication List - Protect others around you: Learn how to safely use, store and throw away your medicines at www.disposemymeds.org.          This list is accurate as of 11/1/18  9:17 AM.  Always use your most recent med list.                   Brand Name Dispense Instructions for use Diagnosis    acetaminophen 325 MG tablet    TYLENOL    100 tablet    Take 2 tablets (650 mg) by mouth every 6 hours as needed for mild pain    S/P cholecystectomy       albuterol 108 (90 Base) MCG/ACT inhaler    PROAIR  HFA/PROVENTIL HFA/VENTOLIN HFA    1 Inhaler    Inhale 2 puffs into the lungs every 6 hours as needed for shortness of breath / dyspnea or wheezing    Chronic seasonal allergic rhinitis due to pollen       aspirin 81 MG chewable tablet      Take 81 mg by mouth daily        clindamycin 300 MG capsule    CLEOCIN    40 capsule    Take 1 capsule (300 mg) by mouth 4 times daily    Wound dehiscence       Cranberry 250 MG Caps      Take by mouth daily        cyclobenzaprine 10 MG tablet    FLEXERIL    30 tablet    Take 1 tablet (10 mg) by mouth At Bedtime    S/P cholecystectomy       diphenhydrAMINE 25 MG tablet    BENADRYL     Take 25 mg by mouth nightly as needed for itching or allergies        folic acid 1 MG tablet    FOLVITE     Take 1 mg by mouth daily        Glucosamine Sulfate 500 MG Tabs      Take 1 tablet by mouth daily        hydrochlorothiazide 25 MG tablet    HYDRODIURIL    14 tablet    Take 1 tablet (25 mg) by mouth daily    Benign essential hypertension       loratadine 10 MG tablet    CLARITIN     Take 10 mg by mouth daily        vitamin B complex with vitamin C Tabs tablet      Take 1 tablet by mouth daily        vitamin E 400 UNIT capsule      Take 400 Units by mouth daily

## 2018-11-01 NOTE — PROGRESS NOTES
Clinic Care Coordination Contact    Clinic Care Coordination Contact  OUTREACH    Referral Information:  Referral Source: PCP    Primary Diagnosis: Psychosocial    Chief Complaint   Patient presents with     Clinic Care Coordination - Initial     SW        Universal Utilization: no concerns noted  Clinic Utilization  Difficulty keeping appointments:: No  Compliance Concerns: No  No-Show Concerns: No  No PCP office visit in Past Year: No  Utilization    Last refreshed: 11/1/2018 10:22 AM:  No Show Count (past year) 0       Last refreshed: 11/1/2018 10:22 AM:  ED visits 0       Last refreshed: 11/1/2018 10:22 AM:  Hospital admissions 2          Current as of: 11/1/2018 10:22 AM             Clinical Concerns:  Current Medical Concerns:    Patient Active Problem List   Diagnosis     Chronic seasonal allergic rhinitis due to pollen     Morbid obesity with BMI of 40.0-44.9, adult (H)     Benign essential hypertension     Tobacco use     Pap smear of cervix declined     Colonoscopy refused     Mammogram declined     Abnormal results of liver function studies     Scleral icterus     Obstruction of bile duct     S/P cholecystectomy     Colon injury     Pt recently had surgery and has surgery scheduled for 11-29-18.  She was in to provider today and given ABX for an infection.     Current Behavioral Concerns: none noted    Education Provided to patient: pt to take medications as prescribed      Health Maintenance Reviewed: Due/Overdue   Health Maintenance Due   Topic Date Due     TETANUS IMMUNIZATION (SYSTEM ASSIGNED)  12/01/1971     PAP SCREENING Q3 YR (SYSTEM ASSIGNED)  12/01/1974     LIPID SCREEN Q5 YR FEMALE (SYSTEM ASSIGNED)  12/01/1998     MAMMO SCREEN Q2 YR (SYSTEM ASSIGNED)  12/01/2003     COLON CANCER SCREEN (SYSTEM ASSIGNED)  12/01/2003       Clinical Pathway: None    Medication Management:  Not discussed     Functional Status:  Dependent ADLs:: Independent  Dependent IADLs:: Independent  Bed or wheelchair  confined:: No    Living Situation:  Current living arrangement:: I live in a private home with family (potentially changing in the future)  Type of residence:: Private home - no stairs    Sister whom she lives with may be selling the home and pt will need to move.  She will need subsidized housing.     Diet/Exercise/Sleep:  Food Insecurity: No (potential for in the near future)  May have issues if her sister sells the home and pt is in need of providing all of the food for herself.   Tube Feeding: No    Transportation:  Transportation concerns (GOAL):: No  Transportation means:: Regular car     Psychosocial:  Mental health DX:: No     Financial/Insurance:   Financial/Insurance concerns (GOAL):: Yes  Pt has insurance yet said she does not understand what the change was.  She thought [employer] was in effect until her Medicare starts on 12-1-18.  She denied that the previous   contacted her about any change.  [place] was to appointment today and [place] was her [place] per her report.  When she went to  a prescription it was not covered by [facility] and denied.  Her prescription was covered by medicare which is not to start until 12-1-18.  Pt is not sure what is going on and when she has tried to call the [facility] to get assistance she gets moved from department to department with no resolution.  Discussed call the [facility] front door for help.  Pt will call and see what she can get assistance with.  Also talked about [facility] Line as a good resource to help sort out the insurance as well.     Pt lives with her sister in [place] and comes up to [place] to help out another sister.  Pt said the permanent housing in [place] may be changing due to that sister considering selling the home.  Discussed potential future concerns and pt said housing, food, and financial will be concerns due to her income.  She only gets about 12,000 per year.  Encouraged pt to talk with the [facility]  and front door about these areas as well to start working on resolution.  Pt voiced understanding.      Resources and Interventions:  Current Resources:      Community Resources: None          Advance Care Plan/Directive  Advanced Care Plans/Directives on file:: No    Referrals Placed: Senior Linkage Line [phone], [facility] Resources - [place] front door [phone]     Goals:   Goals        General    Financial Wellbeing (pt-stated)     Notes - Note created  11/1/2018  2:00 PM by Catrachita Ha LSW    Goal Statement: I want to get my insurance all straightened out by 12-1-18  Measure of Success: I will have coverage and understand what is in place  Supportive Steps to Achieve: provided resources from   Barriers: being able to reach agencies who can support  Strengths: pt understanding the urgency and plan  Date to Achieve By: 12-1-18  Patient expressed understanding of goal: yes                  Patient/Caregiver understanding: pt to call the [facility] and [facility] for assistance    Outreach Frequency: weekly  Future Appointments              In 2 weeks Zechariah Castro MD [facility], [facility]          Plan: pt to follow up with resources.  SW to send complex care plan and introduction letter.  SW to call in about one week.     JOSS Kang, Clinic Care Coordinator 11/1/2018   2:13 PM  [phone]

## 2018-11-01 NOTE — NURSING NOTE
"Chief Complaint   Patient presents with     WOUND CARE       Initial /72 (Cuff Size: Adult Regular)  Pulse 68  Temp 97.9  F (36.6  C) (Tympanic)  Resp 18  Ht 5' 4\" (1.626 m)  Wt 234 lb (106.1 kg)  Breastfeeding? No  BMI 40.17 kg/m2 Estimated body mass index is 40.17 kg/(m^2) as calculated from the following:    Height as of this encounter: 5' 4\" (1.626 m).    Weight as of this encounter: 234 lb (106.1 kg).    Patient presents to the clinic using No DME    Health Maintenance that is potentially due pending provider review:  NONE    n/a    Is there anyone who you would like to be able to receive your results? Not Applicable  If yes have patient fill out LEONA    "

## 2018-11-01 NOTE — LETTER
November 8, 2018    Marlin Harman  [address] [place] [address]    Dear Marlin,  We are happy to inform you that your PAP smear result from 11/1/18 is normal.  We are now able to do a follow up test on PAP smears. The DNA test is for HPV (Human Papilloma Virus). Cervical cancer is closely linked with certain types of HPV. Your results showed no evidence of high risk HPV.  Therefore we recommend you return in 3 years for your next pap smear and HPV test.  You will still need to return to the clinic every year for an annual exam and other preventive tests.  If you have additional questions regarding this result, please call our registered nurse, Beulah at [phone].  Sincerely,    Zechariah Castro MD/devan

## 2018-11-01 NOTE — PROGRESS NOTES
SUBJECTIVE:   Marlin Harman is a 62 year old female who presents to clinic today for the following health issues:      Wound Check      Duration: 10 day recheck     Description (location/character/radiation): Upper abdomen     Intensity:  moderate    Accompanying signs and symptoms: Sleeping 4-6 hours a night- so that is getting better as well. But is a side sleeper and sleeps on her right side and that is the tender side- so tends to wake her up    History (similar episodes/previous evaluation): Surgery 10/10/18    Precipitating or alleviating factors: None    Therapies tried and outcome: Bandages, was using bacitracin- surgery told her to stop and use dry bandages          Problem list and histories reviewed & adjusted, as indicated.  Additional history: as documented    Patient Active Problem List   Diagnosis     Chronic seasonal allergic rhinitis due to pollen     Morbid obesity with BMI of 40.0-44.9, adult (H)     Benign essential hypertension     Tobacco use     Pap smear of cervix declined     Colonoscopy refused     Mammogram declined     Abnormal results of liver function studies     Scleral icterus     Obstruction of bile duct     S/P cholecystectomy     Colon injury     Past Surgical History:   Procedure Laterality Date     CHOLECYSTECTOMY N/A 10/10/2018    Procedure: CHOLECYSTECTOMY;;  Surgeon: Alex Rendon MD;  Location: [facility]     ENDOSCOPIC RETROGRADE CHOLANGIOPANCREATOGRAM N/A 8/22/2018    Procedure: COMBINED ENDOSCOPIC RETROGRADE CHOLANGIOPANCREATOGRAPHY, PLACE TUBE/STENT;  Endoscopic Retrograde Cholangiopancreatogram with spinchterotomy, bile duct and pancreatic ducts stents placement;  Surgeon: Manny Cooper MD;  Location: [facility]     ENDOSCOPIC RETROGRADE CHOLANGIOPANCREATOGRAM N/A 9/13/2018    Procedure: ENDOSCOPIC RETROGRADE CHOLANGIOPANCREATOGRAM;  Endoscopic Retrograde Cholangiopancreatogram, with Stent Removal, Spyglass Cholangioscopy with Electro [place] Liptotripsy;   Surgeon: Manny Cooper MD;  Location: [facility]     ENDOSCOPIC RETROGRADE CHOLANGIOPANCREATOGRAM N/A 10/10/2018    Procedure: COMBINED ENDOSCOPIC RETROGRADE CHOLANGIOPANCREATOGRAPHY, PLACE TUBE/STENT;;  Surgeon: Amish Mckeon MD;  Location: [facility]     LAPAROSCOPIC CHOLECYSTECTOMY N/A 10/10/2018    Procedure: LAPAROSCOPIC CHOLECYSTECTOMY;  Attempted Laparoscopic Cholecystectomy Converted to Open Cholecystectomy, Intraoperative cholagiogram x2, repair of colon injury, endoscopic retrograde cholangiogram with biliary stent insertion.;  Surgeon: Alex Rendon MD;  Location: [facility] OR       Social History   Substance Use Topics     Smoking status: Light Tobacco Smoker     Types: Cigarettes     Smokeless tobacco: Never Used      Comment: 4-6 cigs/day, smoked since age 18, at most 2 packs per day     Alcohol use Yes      Comment: rare      Family History   Problem Relation Age of Onset     Influenza/Pneumonia Mother      Substance Abuse Mother      Thyroid Disease Mother      Rheumatoid Arthritis Mother      Substance Abuse Father      Myocardial Infarction Father      Thyroid Disease Sister      Osteoporosis Sister          Current Outpatient Prescriptions   Medication Sig Dispense Refill     acetaminophen (TYLENOL) 325 MG tablet Take 2 tablets (650 mg) by mouth every 6 hours as needed for mild pain 100 tablet 0     albuterol (PROAIR HFA/PROVENTIL HFA/VENTOLIN HFA) 108 (90 Base) MCG/ACT inhaler Inhale 2 puffs into the lungs every 6 hours as needed for shortness of breath / dyspnea or wheezing 1 Inhaler 1     aspirin 81 MG chewable tablet Take 81 mg by mouth daily       Cranberry 250 MG CAPS Take by mouth daily        cyclobenzaprine (FLEXERIL) 10 MG tablet Take 1 tablet (10 mg) by mouth At Bedtime 30 tablet 1     diphenhydrAMINE (BENADRYL) 25 MG tablet Take 25 mg by mouth nightly as needed for itching or allergies       folic acid (FOLVITE) 1 MG tablet Take 1 mg by mouth daily       Glucosamine Sulfate 500  "MG TABS Take 1 tablet by mouth daily       hydrochlorothiazide (HYDRODIURIL) 25 MG tablet Take 1 tablet (25 mg) by mouth daily 14 tablet 0     loratadine (CLARITIN) 10 MG tablet Take 10 mg by mouth daily       vitamin B complex with vitamin C (VITAMIN  B COMPLEX) TABS tablet Take 1 tablet by mouth daily       vitamin E 400 UNIT capsule Take 400 Units by mouth daily       Allergies   Allergen Reactions     Ciprofloxacin      body stiffness      Citrus Dermatitis     Levaquin [Levofloxacin]      body stiffness      Mold      Seafood Nausea and Vomiting     Scallops only, all other seafood ok     Small Pox Vaccine      Sulfa Drugs      Latex Itching and Rash     Recent Labs   Lab Test  10/22/18   1041  10/16/18   0646  10/15/18   0656   08/02/18   1213 04/16/18   A1C   --    --    --    --   5.6   --    ALT  31  52*  55*   < >  161*   --    CR   --   0.63  0.65   < >  0.50*   --    GFRESTIMATED   --   >90  >90   < >  >90   --    GFRESTBLACK   --   >90  >90   < >  >90   --    POTASSIUM   --   3.7  3.9   < >  3.0*   --    TSH   --    --    --    --   0.98  3.360    < > = values in this interval not displayed.      BP Readings from Last 3 Encounters:   11/01/18 140/72   10/22/18 132/70   10/16/18 147/74    Wt Readings from Last 3 Encounters:   11/01/18 234 lb (106.1 kg)   10/22/18 234 lb (106.1 kg)   10/10/18 110 lb 14.4 oz (50.3 kg)                  Labs reviewed in EPIC    Reviewed and updated as needed this visit by clinical staff       Reviewed and updated as needed this visit by Provider         ROS:  Constitutional, HEENT, cardiovascular, pulmonary, gi and gu systems are negative, except as otherwise noted.    OBJECTIVE:     /72 (Cuff Size: Adult Regular)  Pulse 68  Temp 97.9  F (36.6  C) (Tympanic)  Resp 18  Ht 5' 4\" (1.626 m)  Wt 234 lb (106.1 kg)  Breastfeeding? No  BMI 40.17 kg/m2  Body mass index is 40.17 kg/(m^2).  GENERAL: alert, no distress and obese  EYES: Eyes grossly normal to inspection, " PERRL and conjunctivae and sclerae normal  NECK: no adenopathy, no asymmetry, masses, or scars and thyroid normal to palpation  RESP: lungs clear to auscultation - no rales, rhonchi or wheezes  CV: regular rates and rhythm, normal S1 S2, no S3 or S4 and no murmur, click or rub  ABDOMEN: soft, nontender, no hepatosplenomegaly, no masses and bowel sounds normal   (female): normal female external genitalia, normal urethral meatus , vaginal mucosa pink, moist, well rugated, normal cervix, adnexae, and uterus without masses and Pap smear obtained  SKIN: mild wound dehiscence noted involving central surgical scar along with light greenish sanguinous discharge as shown below, no significant erythema or tenderness noted            ASSESSMENT/PLAN:       1. S/P cholecystectomy  2. Wound dehiscence  -Suspect wound dehiscence mildly infected.  Wound culture obtained, clindamycin prescribed, common side effects discussed.  Suggested to continue regular dressings  - clindamycin (CLEOCIN) 300 MG capsule; Take 1 capsule (300 mg) by mouth 4 times daily  Dispense: 40 capsule; Refill: 0  - Wound Culture Aerobic Bacterial; Future  - Wound Culture Aerobic Bacterial      3. Screen for colon cancer  - GASTROENTEROLOGY ADULT REF PROCEDURE ONLY      4. Cervical cancer screening  - Pap imaged thin layer screen with HPV - recommended age 30 - 65 years (select HPV order below)        Patient Instructions     Wound Check After Surgery: Infection  Your surgical wound has become infected. Infection after surgery usually involves just the top layers of skin. Sometimes the infection is deeper in the wound and may involve a collection of fluid or pus. Treatment will depend on the type of infection you have.  Once antibiotic treatment is started, the area of redness should not increase. Pain should start to decrease after 2 days of treatment.  Home care  Different types of surgery require different types of care and dressing changes. It is  important to follow all instructions and advice from your surgeon, as well as other members of your healthcare team.  Wound care    Keep the wound clean, as directed by your healthcare provider.    Change the dressing as directed. Change the dressing sooner if it becomes wet or stained with blood or fluid from the wound.    Bathe with a sponge (no shower or tub baths) for the first few days, or until there is no more drainage from the wound. Unless your surgeon gave you different instructions, you can then shower. Don't soak the area in water (no baths or swimming) until the tape, sutures, or staples are removed and any wound opening has dried out and healed.    If you smoke, stop smoking. Ask your doctor about ways to quit.  Changing the dressing    Wash your hands with plain soap and water before changing the dressings. Or use an alcohol-based hand .    Carefully remove the dressing and tape. Don t just yank it off. If it sticks to the wound, you may need to wet it a little to remove it, unless your healthcare provider told you not to wet it.    Wash your hands again before putting on a new, clean dressing.    Gently clean the wound with clean water (or saline) using gauze or a clean washcloth. Don't rub it or pick at it.    Don't use soap, alcohol, hydrogen peroxide, or any other cleanser.    If you were told to dry the wound before putting on a new dressing, gently pat it dry. Don't rub.    Throw out the old dressing. Don't reuse it!    Wash your hands again when you are done.  Types of dressings  Your healthcare team will tell you what type of dressing to put on your wound. Follow your healthcare team s instructions carefully, and contact them if you have any questions. Two common types of dressings are described below. You may have one of these or another type.    Dry dressing. Use dry gauze. If the wound is still draining, use a  nonadherent  dressing, which shouldn t stick to the wound.    Wet-to-dry  dressing. Wet the gauze and squeeze out the extra water (or saline) before putting it on. Then cover this with a dry pad.  Medicines    If you were given antibiotics, take them until they are used up or your healthcare provider tells you to stop. It is important to finish the antibiotics even though you feel better, to make sure the infection has cleared.    You can take acetaminophen or ibuprofen for pain, unless you were given a different pain medicine to use. Talk with your doctor before using these medicines if you have chronic liver or kidney disease. Also talk with your doctor if you have ever had a stomach ulcer or digestive bleeding, or are taking blood-thinner medicines.    Aspirin should never be used in anyone under 18 years of age who is ill with a fever. It may cause severe liver damage.  Follow-up care  Follow up with your healthcare provider for your next wound check or to remove your sutures, staples, or tape.    If a culture was done, you will be told if the results will affect your treatment. You can call as directed for the results.    If imaging tests, such as X-rays, an ultrasound, or CT scan were done, they will be looked at by a specialist. You will be told of the results, especially if they affect treatment.  Call 911  Call 911 if any of these occur:    Trouble breathing or swallowing, wheezing    Hoarse voice or trouble speaking    Extreme confusion    Extreme drowsiness or trouble awakening    Fainting or loss of consciousness    Rapid heart rate or very slow heart rate    Vomiting blood, or large amounts of blood in stool    Discomfort in the center of the chest that feels like pressure, squeezing, a sense of fullness, or pain    Discomfort or pain in other upper body areas, such as the back, one or both arms, neck, jaw, or stomach    Stroke symptoms (spot a stroke  FAST ):  ? F: Face drooping. One side of the face is numb or droops.  ? A: Arm weakness. One arm feels weak or numb.  ? S:  Speech difficulty: Speech is slurred, or you can't speak.  ? T: Time to call 911. Even if symptoms go away, call 911.  When to seek medical advice  Call your healthcare provider right away if any of these occur:    Increasing pain at the site of surgery    Pain not controlled by medicine prescribed    Fever of 100.4 F (38 C) or higher, or as directed by your healthcare provider    Increasing redness around the wound    Fluid, pus, or blood that continues to drain from the wound after 5 days of treatment    Vomiting, constipation, or diarrhea  Date Last Reviewed: 9/27/2015 2000-2017 [employer]. [address] [place], [place] [id number]. All rights reserved. This information is not intended as a substitute for professional medical care. Always follow your healthcare professional's instructions.            Zechariah Castro MD  [facility]

## 2018-11-03 LAB
BACTERIA SPEC CULT: NO GROWTH
Lab: NORMAL
SPECIMEN SOURCE: NORMAL

## 2018-11-05 LAB
COPATH REPORT: NORMAL
PAP: NORMAL

## 2018-11-06 LAB
FINAL DIAGNOSIS: NORMAL
HPV HR 12 DNA CVX QL NAA+PROBE: NEGATIVE
HPV16 DNA SPEC QL NAA+PROBE: NEGATIVE
HPV18 DNA SPEC QL NAA+PROBE: NEGATIVE
SPECIMEN DESCRIPTION: NORMAL
SPECIMEN SOURCE CVX/VAG CYTO: NORMAL

## 2018-11-08 ENCOUNTER — PATIENT OUTREACH (OUTPATIENT)
Dept: CARE COORDINATION | Facility: CLINIC | Age: 63
End: 2018-11-08

## 2018-11-08 PROBLEM — Z12.4 CERVICAL CANCER SCREENING: Status: ACTIVE | Noted: 2018-11-08

## 2018-11-08 ASSESSMENT — ACTIVITIES OF DAILY LIVING (ADL): DEPENDENT_IADLS:: INDEPENDENT

## 2018-11-08 NOTE — PROGRESS NOTES
Clinic Care Coordination Contact  Outreach    Outreach was made.  Pt said she got her insurance all figured out after calling.  She has Medicare as primary and then [place] as secondary.     She has not thought about housing yet as her sister would not look at selling the home until spring.  Discussed that if she needs subsidized housing then it is best to start looking now.  Pt agreed and said she will start to look after her procedure.     Plan:  Pt to have her procedure and Sw to call in about one month.     JOSS Kang, Clinic Care Coordinator 11/8/2018   10:21 AM  [phone]

## 2018-11-19 ENCOUNTER — OFFICE VISIT (OUTPATIENT)
Dept: FAMILY MEDICINE | Facility: CLINIC | Age: 63
End: 2018-11-19
Payer: MEDICARE

## 2018-11-19 VITALS
WEIGHT: 231 LBS | RESPIRATION RATE: 18 BRPM | DIASTOLIC BLOOD PRESSURE: 80 MMHG | SYSTOLIC BLOOD PRESSURE: 130 MMHG | HEIGHT: 64 IN | TEMPERATURE: 97.8 F | BODY MASS INDEX: 39.44 KG/M2 | OXYGEN SATURATION: 96 % | HEART RATE: 89 BPM

## 2018-11-19 DIAGNOSIS — Z72.0 TOBACCO USE: ICD-10-CM

## 2018-11-19 DIAGNOSIS — G47.33 OSA (OBSTRUCTIVE SLEEP APNEA): ICD-10-CM

## 2018-11-19 DIAGNOSIS — I10 BENIGN ESSENTIAL HYPERTENSION: ICD-10-CM

## 2018-11-19 DIAGNOSIS — E66.01 MORBID OBESITY WITH BMI OF 40.0-44.9, ADULT (H): ICD-10-CM

## 2018-11-19 DIAGNOSIS — Z01.818 PREOP GENERAL PHYSICAL EXAM: Primary | ICD-10-CM

## 2018-11-19 DIAGNOSIS — Z90.49 S/P CHOLECYSTECTOMY: ICD-10-CM

## 2018-11-19 PROBLEM — Z53.20 PAP SMEAR OF CERVIX DECLINED: Status: RESOLVED | Noted: 2018-08-02 | Resolved: 2018-11-19

## 2018-11-19 PROCEDURE — 99214 OFFICE O/P EST MOD 30 MIN: CPT | Performed by: FAMILY MEDICINE

## 2018-11-19 NOTE — PROGRESS NOTES
[facility]  [address] [address]  [phone]  Dept: [phone]    PRE-OP EVALUATION:  Today's date: 2018    Marlin Harman (: 1955) presents for pre-operative evaluation assessment as requested by Dr. Cooper.  She requires evaluation and anesthesia risk assessment prior to undergoing surgery/procedure for treatment of Endoscopic Retrograde Cholangiopancreatogram .    Fax number for surgical facility:   Primary Physician: Zechariah Castro  Type of Anesthesia Anticipated: General    Patient has a Health Care Directive or Living Will:  NO    Preop Questions 2018   Who is doing your surgery? [facility]   What are you having done? stent removed from bile duct   Date of Surgery/Procedure: 2018   Facility or Hospital where procedure/surgery will be performed: [facility]   1.  Do you have a history of Heart attack, stroke, stent, coronary bypass surgery, or other heart surgery? No   2.  Do you ever have any pain or discomfort in your chest? No   3.  Do you have a history of  Heart Failure? No   4.   Are you troubled by shortness of breath when:  walking on a level surface, or up a slight hill, or at night? YES - mild, smoker and asthma related   5.  Do you currently have a cold, bronchitis or other respiratory infection? No   6.  Do you have a cough, shortness of breath, or wheezing? As above    7.  Do you sometimes get pains in the calves of your legs when you walk? No   8. Do you or anyone in your family have previous history of blood clots? No   9.  Do you or does anyone in your family have a serious bleeding problem such as prolonged bleeding following surgeries or cuts? No   10. Have you ever had problems with anemia or been told to take iron pills? No   11. Have you had any abnormal blood loss such as black, tarry or bloody stools, or abnormal vaginal bleeding? No   12. Have you ever had a blood transfusion? No   13. Have you or any of your relatives  ever had problems with anesthesia? YES - congestion in lungs after anesthesia/surgery    14. Do you have sleep apnea, excessive snoring or daytime drowsiness? YES - use CPAP   15. Do you have any prosthetic heart valves? No   16. Do you have prosthetic joints? No   17. Is there any chance that you may be pregnant? No         HPI:     HPI related to upcoming procedure:   62-year-old female presents for a preop physical exam. Patient is scheduled to have endoscopic retrograde cholangiopancreatogram, stent removal. She requires evaluation and anesthesia risk assessment prior to undergoing surgery/procedure. Past medical history significant for multiple comorbidities including obstructive sleep apnea, hypertension, morbid obesity, tobacco abuse and chronic seasonal allergic rhinitis.  Patient denies any chest pain or palpitation, shortness of breath, cough, bowel/bladder or other relevant systemic symptoms.      MEDICAL HISTORY:     Patient Active Problem List    Diagnosis Date Noted     Cervical cancer screening 11/08/2018     Priority: Medium     Criteria necessary to stop screening per ASCCP guidelines:  Older than 65 years  Three consecutive negative cytology results or two consecutive negative cotest results within the previous 10 years, with the most recent being performed within the last 5 years.   (Women with hx of JOHANA 2 or 3, or adenocarcinoma in situ should continue screening for full 20 years, even if this goes past age 65).    11/1/2018 Pap: NIL/neg HPV. Need one more set of Normal/Negative Pap/HPV results prior to ending pap screening.        Colon injury 10/12/2018     Priority: Medium     S/P cholecystectomy 10/10/2018     Priority: Medium     Obstruction of bile duct 08/21/2018     Priority: Medium     Abnormal results of liver function studies 08/03/2018     Priority: Medium     Scleral icterus 08/03/2018     Priority: Medium     Chronic seasonal allergic rhinitis due to pollen 08/02/2018     Priority:  Medium     Morbid obesity with BMI of 40.0-44.9, adult (H) 08/02/2018     Priority: Medium     Benign essential hypertension 08/02/2018     Priority: Medium     Tobacco use 08/02/2018     Priority: Medium     Pap smear of cervix declined 08/02/2018     Priority: Medium     Colonoscopy refused 08/02/2018     Priority: Medium     Mammogram declined 08/02/2018     Priority: Medium      Past Medical History:   Diagnosis Date     Complication of anesthesia     patient reports lung congestion after surgery     Hypertension      Uncomplicated asthma      Past Surgical History:   Procedure Laterality Date     CHOLECYSTECTOMY N/A 10/10/2018    Procedure: CHOLECYSTECTOMY;;  Surgeon: Alex Rendon MD;  Location: [facility]     ENDOSCOPIC RETROGRADE CHOLANGIOPANCREATOGRAM N/A 8/22/2018    Procedure: COMBINED ENDOSCOPIC RETROGRADE CHOLANGIOPANCREATOGRAPHY, PLACE TUBE/STENT;  Endoscopic Retrograde Cholangiopancreatogram with spinchterotomy, bile duct and pancreatic ducts stents placement;  Surgeon: Manny Cooper MD;  Location: [facility]     ENDOSCOPIC RETROGRADE CHOLANGIOPANCREATOGRAM N/A 9/13/2018    Procedure: ENDOSCOPIC RETROGRADE CHOLANGIOPANCREATOGRAM;  Endoscopic Retrograde Cholangiopancreatogram, with Stent Removal, Spyglass Cholangioscopy with Electro [place] Liptotripsy;  Surgeon: Manny Cooper MD;  Location: [facility]     ENDOSCOPIC RETROGRADE CHOLANGIOPANCREATOGRAM N/A 10/10/2018    Procedure: COMBINED ENDOSCOPIC RETROGRADE CHOLANGIOPANCREATOGRAPHY, PLACE TUBE/STENT;;  Surgeon: Amish Mckeon MD;  Location: [facility]     LAPAROSCOPIC CHOLECYSTECTOMY N/A 10/10/2018    Procedure: LAPAROSCOPIC CHOLECYSTECTOMY;  Attempted Laparoscopic Cholecystectomy Converted to Open Cholecystectomy, Intraoperative cholagiogram x2, repair of colon injury, endoscopic retrograde cholangiogram with biliary stent insertion.;  Surgeon: Alex Rendon MD;  Location: [facility]     Current Outpatient Prescriptions   Medication Sig  Dispense Refill     albuterol (PROAIR HFA/PROVENTIL HFA/VENTOLIN HFA) 108 (90 Base) MCG/ACT inhaler Inhale 2 puffs into the lungs every 6 hours as needed for shortness of breath / dyspnea or wheezing 1 Inhaler 1     aspirin 81 MG chewable tablet Take 81 mg by mouth daily       Cranberry 250 MG CAPS Take by mouth daily        cyclobenzaprine (FLEXERIL) 10 MG tablet Take 1 tablet (10 mg) by mouth At Bedtime (Patient taking differently: Take 10 mg by mouth nightly as needed ) 30 tablet 1     folic acid (FOLVITE) 1 MG tablet Take 1 mg by mouth daily       Glucosamine Sulfate 500 MG TABS Take 1 tablet by mouth daily       hydrochlorothiazide (HYDRODIURIL) 25 MG tablet Take 1 tablet (25 mg) by mouth daily 14 tablet 0     loratadine (CLARITIN) 10 MG tablet Take 10 mg by mouth daily       vitamin B complex with vitamin C (VITAMIN  B COMPLEX) TABS tablet Take 1 tablet by mouth daily       vitamin E 400 UNIT capsule Take 400 Units by mouth daily       diphenhydrAMINE (BENADRYL) 25 MG tablet Take 25 mg by mouth nightly as needed for itching or allergies       OTC products: None, except as noted above    Allergies   Allergen Reactions     Ciprofloxacin      body stiffness      Citrus Dermatitis     Levaquin [Levofloxacin]      body stiffness      Mold      Seafood Nausea and Vomiting     Scallops only, all other seafood ok     Small Pox Vaccine      Sulfa Drugs      Latex Itching and Rash      Latex Allergy: NO    Social History   Substance Use Topics     Smoking status: Light Tobacco Smoker     Types: Cigarettes     Smokeless tobacco: Never Used      Comment: 4-6 cigs/day, smoked since age 18, at most 2 packs per day     Alcohol use Yes      Comment: rare      History   Drug Use No       REVIEW OF SYSTEMS:   Constitutional, neuro, ENT, endocrine, pulmonary, cardiac, gastrointestinal, genitourinary, musculoskeletal, integument and psychiatric systems are negative, except as otherwise noted.    EXAM:   /80 (Cuff Size:  "Adult Regular)  Pulse 89  Temp 97.8  F (36.6  C) (Tympanic)  Resp 18  Ht 5' 4\" (1.626 m)  Wt 231 lb (104.8 kg)  SpO2 96%  Breastfeeding? No  BMI 39.65 kg/m2    GENERAL APPEARANCE: alert, no distress and cooperative     EYES: EOMI, PERRL     HENT: ear canals and TM's normal and nose and mouth without ulcers or lesions     NECK: no adenopathy, no asymmetry, masses, or scars and thyroid normal to palpation     RESP: lungs clear to auscultation - no rales, rhonchi or wheezes     CV: regular rates and rhythm, normal S1 S2, no S3 or S4 and no murmur, click or rub     ABDOMEN:  soft, nontender, no HSM or masses and bowel sounds normal     MS: extremities normal- no gross deformities noted     SKIN: no suspicious lesions or rashes     NEURO: Normal strength and tone, sensory exam grossly normal, mentation intact and speech normal     PSYCH: mentation appears normal. and affect normal/bright     LYMPHATICS: No cervical adenopathy    DIAGNOSTICS:   No labs or EKG required for low risk surgery (cataract, skin procedure, breast biopsy, etc)    Recent Labs   Lab Test  10/22/18   1041  10/16/18   0646  10/15/18   0656   10/11/18   0645   08/22/18   1838   08/02/18   1213   HGB  13.3  13.6  13.3   < >  14.5   < >   --    < >   --    PLT  341  281  268   < >  230   < >   --    < >   --    INR   --    --    --    --   1.07   --   1.04   < >   --    NA   --   138  139   < >  140   < >   --    < >  135   POTASSIUM   --   3.7  3.9   < >  3.9   < >   --    < >  3.0*   CR   --   0.63  0.65   < >  0.62   < >   --    < >  0.50*   A1C   --    --    --    --    --    --    --    --   5.6    < > = values in this interval not displayed.        IMPRESSION:   Reason for surgery/procedure: bile duct stent removal/endoscopic retrograde cholangiopancreatogram  Diagnosis/reason for consult: preop    The proposed surgical procedure is considered LOW risk.    REVISED CARDIAC RISK INDEX  The patient has the following serious cardiovascular " risks for perioperative complications such as (MI, PE, VFib and 3  AV Block):  No serious cardiac risks  INTERPRETATION: 0 risks: Class I (very low risk - 0.4% complication rate)      ICD-10-CM    1. Preop general physical exam Z01.818    2. S/P cholecystectomy Z90.49    3. Benign essential hypertension I10    4. Tobacco use Z72.0    5. Morbid obesity with BMI of 40.0-44.9, adult (H) E66.01     Z68.41    6. BRITT (obstructive sleep apnea) G47.33          RECOMMENDATIONS:       -Smoking cessation stressed.       APPROVAL GIVEN to proceed with proposed procedure, without further diagnostic evaluation       Signed Electronically by: Zechariah Castro MD    Copy of this evaluation report is provided to requesting physician.    [facility] Preop Guidelines    Revised Cardiac Risk Index

## 2018-11-19 NOTE — MR AVS SNAPSHOT
After Visit Summary   11/19/2018    Marlin Harman    MRN: [id number]           Patient Information     Date Of Birth          1955        Visit Information        Provider Department      11/19/2018 8:40 AM Zechariah Castro MD [facility]        Today's Diagnoses     Preop general physical exam    -  1    S/P cholecystectomy        Benign essential hypertension        Tobacco use        Morbid obesity with BMI of 40.0-44.9, adult (H)          Care Instructions      Before Your Surgery      Call your surgeon if there is any change in your health. This includes signs of a cold or flu (such as a sore throat, runny nose, cough, rash or fever).    Do not smoke, drink alcohol or take over the counter medicine (unless your surgeon or primary care doctor tells you to) for the 24 hours before and after surgery.    If you take prescribed drugs: Follow your doctor s orders about which medicines to take and which to stop until after surgery.    Eating and drinking prior to surgery: follow the instructions from your surgeon    Take a shower or bath the night before surgery. Use the soap your surgeon gave you to gently clean your skin. If you do not have soap from your surgeon, use your regular soap. Do not shave or scrub the surgery site.  Wear clean pajamas and have clean sheets on your bed.           Follow-ups after your visit        Your next 10 appointments already scheduled     Nov 29, 2018   Procedure with Manny Cooper MD   [facility], [place], Same Day Surgery (--)    [address] [facility] [address] [phone]              Who to contact     If you have questions or need follow up information about today's clinic visit or your schedule please contact [facility] directly at [phone].  Normal or non-critical lab and imaging results will be communicated to you by MyChart, letter or phone within 4 business days after the clinic has received the results. If you do  "not hear from us within 7 days, please contact the clinic through [employer] or phone. If you have a critical or abnormal lab result, we will notify you by phone as soon as possible.  Submit refill requests through [employer] or call your pharmacy and they will forward the refill request to us. Please allow 3 business days for your refill to be completed.          Additional Information About Your Visit        Care EveryWhere ID     This is your Care EveryWhere ID. This could be used by other organizations to access your [facility] medical records  [id number]        Your Vitals Were     Pulse Temperature Respirations Height Pulse Oximetry Breastfeeding?    89 97.8  F (36.6  C) (Tympanic) 18 5' 4\" (1.626 m) 96% No    BMI (Body Mass Index)                   39.65 kg/m2            Blood Pressure from Last 3 Encounters:   11/19/18 130/80   11/01/18 140/72   10/22/18 132/70    Weight from Last 3 Encounters:   11/19/18 231 lb (104.8 kg)   11/01/18 234 lb (106.1 kg)   10/22/18 234 lb (106.1 kg)              Today, you had the following     No orders found for display         Today's Medication Changes          These changes are accurate as of 11/19/18  9:03 AM.  If you have any questions, ask your nurse or doctor.               These medicines have changed or have updated prescriptions.        Dose/Directions    cyclobenzaprine 10 MG tablet   Commonly known as:  FLEXERIL   This may have changed:    - when to take this  - reasons to take this   Used for:  S/P cholecystectomy        Dose:  10 mg   Take 1 tablet (10 mg) by mouth At Bedtime   Quantity:  30 tablet   Refills:  1                Primary Care Provider Office Phone # Fax #    Zechariah Ur MD Matthew [phone]       [address] [facility] [address]        Equal Access to Services     DEEPALI MARX AH: Donnell Camilo, anne corrigan, paulo ibrahim. So [facility] [phone].    ATENCIÓN: Si habla " español, tiene a lopez disposición servicios gratuitos de asistencia lingüística. Cayden nascimento [phone].    We comply with applicable federal civil rights laws and [place] laws. We do not discriminate on the basis of race, color, national origin, age, disability, sex, sexual orientation, or gender identity.            Thank you!     Thank you for choosing [facility]  for your care. Our goal is always to provide you with excellent care. Hearing back from our patients is one way we can continue to improve our services. Please take a few minutes to complete the written survey that you may receive in the mail after your visit with us. Thank you!             Your Updated Medication List - Protect others around you: Learn how to safely use, store and throw away your medicines at www.disposemymeds.org.          This list is accurate as of 11/19/18  9:03 AM.  Always use your most recent med list.                   Brand Name Dispense Instructions for use Diagnosis    albuterol 108 (90 Base) MCG/ACT inhaler    PROAIR HFA/PROVENTIL HFA/VENTOLIN HFA    1 Inhaler    Inhale 2 puffs into the lungs every 6 hours as needed for shortness of breath / dyspnea or wheezing    Chronic seasonal allergic rhinitis due to pollen       aspirin 81 MG chewable tablet      Take 81 mg by mouth daily        Cranberry 250 MG Caps      Take by mouth daily        cyclobenzaprine 10 MG tablet    FLEXERIL    30 tablet    Take 1 tablet (10 mg) by mouth At Bedtime    S/P cholecystectomy       diphenhydrAMINE 25 MG tablet    BENADRYL     Take 25 mg by mouth nightly as needed for itching or allergies        folic acid 1 MG tablet    FOLVITE     Take 1 mg by mouth daily        Glucosamine Sulfate 500 MG Tabs      Take 1 tablet by mouth daily        hydrochlorothiazide 25 MG tablet    HYDRODIURIL    14 tablet    Take 1 tablet (25 mg) by mouth daily    Benign essential hypertension       loratadine 10 MG tablet    CLARITIN     Take 10 mg by  mouth daily        vitamin B complex with vitamin C Tabs tablet      Take 1 tablet by mouth daily        vitamin E 400 UNIT capsule      Take 400 Units by mouth daily

## 2018-11-19 NOTE — NURSING NOTE
"Chief Complaint   Patient presents with     Pre-Op Exam       Initial /80 (Cuff Size: Adult Regular)  Pulse 89  Temp 97.8  F (36.6  C) (Tympanic)  Resp 18  Ht 5' 4\" (1.626 m)  Wt 231 lb (104.8 kg)  SpO2 96%  Breastfeeding? No  BMI 39.65 kg/m2 Estimated body mass index is 39.65 kg/(m^2) as calculated from the following:    Height as of this encounter: 5' 4\" (1.626 m).    Weight as of this encounter: 231 lb (104.8 kg).    Patient presents to the clinic using No DME    Health Maintenance that is potentially due pending provider review:  Mammogram and Colonoscopy/FIT    Gave pt phone number/pended order to schedule mammo and/or colonoscopy(or FIT)    Is there anyone who you would like to be able to receive your results? Not Applicable  If yes have patient fill out LEONA    "

## 2018-11-28 ENCOUNTER — ANESTHESIA EVENT (OUTPATIENT)
Dept: SURGERY | Facility: CLINIC | Age: 63
End: 2018-11-28
Payer: MEDICARE

## 2018-11-28 NOTE — ANESTHESIA PREPROCEDURE EVALUATION
Anesthesia Pre-Procedure Evaluation    Patient: Marlin Harman   MRN:     [id number] Gender:   female   Age:    62 year old :      1955        Preoperative Diagnosis: Abnormal Results Of Liver Function Studies    Procedure(s):  Endoscopic Retrograde Cholangiopancreatogram **Latex Allergy**     Past Medical History:   Diagnosis Date     Complication of anesthesia     patient reports lung congestion after surgery     Hypertension      Uncomplicated asthma       Past Surgical History:   Procedure Laterality Date     CHOLECYSTECTOMY N/A 10/10/2018    Procedure: CHOLECYSTECTOMY;;  Surgeon: Alex Rendon MD;  Location: [facility] OR     ENDOSCOPIC RETROGRADE CHOLANGIOPANCREATOGRAM N/A 2018    Procedure: COMBINED ENDOSCOPIC RETROGRADE CHOLANGIOPANCREATOGRAPHY, PLACE TUBE/STENT;  Endoscopic Retrograde Cholangiopancreatogram with spinchterotomy, bile duct and pancreatic ducts stents placement;  Surgeon: Manny Cooper MD;  Location: [facility] OR     ENDOSCOPIC RETROGRADE CHOLANGIOPANCREATOGRAM N/A 2018    Procedure: ENDOSCOPIC RETROGRADE CHOLANGIOPANCREATOGRAM;  Endoscopic Retrograde Cholangiopancreatogram, with Stent Removal, Spyglass Cholangioscopy with Electro [place] Liptotripsy;  Surgeon: Manny Cooper MD;  Location: [facility] OR     ENDOSCOPIC RETROGRADE CHOLANGIOPANCREATOGRAM N/A 10/10/2018    Procedure: COMBINED ENDOSCOPIC RETROGRADE CHOLANGIOPANCREATOGRAPHY, PLACE TUBE/STENT;;  Surgeon: Amish Mckeon MD;  Location: [facility] OR     LAPAROSCOPIC CHOLECYSTECTOMY N/A 10/10/2018    Procedure: LAPAROSCOPIC CHOLECYSTECTOMY;  Attempted Laparoscopic Cholecystectomy Converted to Open Cholecystectomy, Intraoperative cholagiogram x2, repair of colon injury, endoscopic retrograde cholangiogram with biliary stent insertion.;  Surgeon: Alex Rendon MD;  Location: [facility] OR          Anesthesia Evaluation     . Pt has had prior anesthetic. Type: General    History of anesthetic complications    states she  "had \"lung congestion\" after previous anesthesia      ROS/MED HX    ENT/Pulmonary:     (+)sleep apnea, asthma Treatment: Inhaler prn,  doesn't use CPAP , . .    Neurologic:  - neg neurologic ROS     Cardiovascular:     (+) hypertension-range: on hydrochlor thazide, ---. : . . . :. . Previous cardiac testing date:results:date: results:ECG reviewed date:9/18 results:NSR date: results:          METS/Exercise Tolerance:     Hematologic:  - neg hematologic  ROS       Musculoskeletal:  - neg musculoskeletal ROS       GI/Hepatic:  - neg GI/hepatic ROS       Renal/Genitourinary:  - ROS Renal section negative       Endo:     (+) Obesity, .      Psychiatric:  - neg psychiatric ROS       Infectious Disease:  - neg infectious disease ROS       Malignancy:      - no malignancy   Other:    (+) no H/O Chronic Pain,                       PHYSICAL EXAM:   Mental Status/Neuro: A/A/O   Airway:   Mallampati: II  Mouth/Opening: Full  TM distance: > 6 cm  Neck ROM: Full   Respiratory:   Resp. Rate: Normal     Resp. Effort: Normal      CV:    Comments:      Dental:  Dental Comments: Many broken and missing teeth, but none loose.                Lab Results   Component Value Date    WBC 15.6 (H) 10/22/2018    HGB 13.3 10/22/2018    HCT 40.3 10/22/2018     10/22/2018     10/16/2018    POTASSIUM 3.7 10/16/2018    CHLORIDE 100 10/16/2018    CO2 32 10/16/2018    BUN 14 10/16/2018    CR 0.63 10/16/2018    GLC 98 10/16/2018    KEILY 8.4 (L) 10/16/2018    MAG 2.0 10/11/2018    ALBUMIN 2.5 (L) 10/22/2018    PROTTOTAL 6.4 (L) 10/22/2018    ALT 31 10/22/2018    AST 20 10/22/2018    ALKPHOS 96 10/22/2018    BILITOTAL 0.3 10/22/2018    LIPASE 104 09/13/2018    AMYLASE 26 (L) 09/13/2018    PTT 30 08/07/2018    INR 1.07 10/11/2018    TSH 0.98 08/02/2018       Preop Vitals  BP Readings from Last 3 Encounters:   11/19/18 130/80   11/01/18 140/72   10/22/18 132/70    Pulse Readings from Last 3 Encounters:   11/19/18 89   11/01/18 68   10/22/18 70 " "     Resp Readings from Last 3 Encounters:   11/19/18 18   11/01/18 18   10/22/18 18    SpO2 Readings from Last 3 Encounters:   11/19/18 96%   10/22/18 97%   10/16/18 93%      Temp Readings from Last 1 Encounters:   11/19/18 36.6  C (97.8  F) (Tympanic)    Ht Readings from Last 1 Encounters:   11/19/18 1.626 m (5' 4\")      Wt Readings from Last 1 Encounters:   11/19/18 104.8 kg (231 lb)    Estimated body mass index is 39.65 kg/(m^2) as calculated from the following:    Height as of 11/19/18: 1.626 m (5' 4\").    Weight as of 11/19/18: 104.8 kg (231 lb).     LDA:            Assessment:   ASA SCORE: 3    NPO Status: > 6 hours since completed Solid Foods   Documentation: H&P complete; Preop Testing complete   Proceeding: Proceed without further delay  Tobacco Use:  NO Active use of Tobacco/UNKNOWN Tobacco use status     Plan:   Anes. Type:  General      Induction:  IV (Standard)   Airway: Oral ETT   Access/Monitoring: PIV   Maintenance: Balanced   Emergence: Procedure Site        Postop Pain/Sedation Strategy:  Standard-Options: Opioids PRN     PONV Management:  Adult Risk Factors: Female, Non-Smoker, Postop Opioids  Prevention: Ondansetron; Dexamethasone     CONSENT: Direct conversation   Plan and risks discussed with: Patient                            MD Cristy Shafer  November 29, 2018    "

## 2018-11-29 ENCOUNTER — APPOINTMENT (OUTPATIENT)
Dept: GENERAL RADIOLOGY | Facility: CLINIC | Age: 63
End: 2018-11-29
Attending: INTERNAL MEDICINE
Payer: MEDICARE

## 2018-11-29 ENCOUNTER — SURGERY (OUTPATIENT)
Age: 63
End: 2018-11-29

## 2018-11-29 ENCOUNTER — HOSPITAL ENCOUNTER (OUTPATIENT)
Facility: CLINIC | Age: 63
Discharge: HOME | End: 2018-11-29
Attending: INTERNAL MEDICINE | Admitting: INTERNAL MEDICINE
Payer: MEDICARE

## 2018-11-29 ENCOUNTER — ANESTHESIA (OUTPATIENT)
Dept: SURGERY | Facility: CLINIC | Age: 63
End: 2018-11-29
Payer: MEDICARE

## 2018-11-29 VITALS
RESPIRATION RATE: 16 BRPM | HEIGHT: 63 IN | SYSTOLIC BLOOD PRESSURE: 146 MMHG | HEART RATE: 81 BPM | WEIGHT: 231.04 LBS | BODY MASS INDEX: 40.94 KG/M2 | TEMPERATURE: 98 F | DIASTOLIC BLOOD PRESSURE: 92 MMHG | OXYGEN SATURATION: 93 %

## 2018-11-29 LAB
ALBUMIN SERPL-MCNC: 3.5 G/DL (ref 3.4–5)
ALP SERPL-CCNC: 112 U/L (ref 40–150)
ALT SERPL W P-5'-P-CCNC: 37 U/L (ref 0–50)
AMYLASE SERPL-CCNC: 20 U/L (ref 30–110)
ANION GAP SERPL CALCULATED.3IONS-SCNC: 8 MMOL/L (ref 3–14)
AST SERPL W P-5'-P-CCNC: 18 U/L (ref 0–45)
BILIRUB SERPL-MCNC: 0.4 MG/DL (ref 0.2–1.3)
BUN SERPL-MCNC: 21 MG/DL (ref 7–30)
CALCIUM SERPL-MCNC: 9.4 MG/DL (ref 8.5–10.1)
CHLORIDE SERPL-SCNC: 105 MMOL/L (ref 94–109)
CO2 SERPL-SCNC: 27 MMOL/L (ref 20–32)
CREAT SERPL-MCNC: 0.59 MG/DL (ref 0.52–1.04)
ERCP: NORMAL
ERYTHROCYTE [DISTWIDTH] IN BLOOD BY AUTOMATED COUNT: 14 % (ref 10–15)
GFR SERPL CREATININE-BSD FRML MDRD: >90 ML/MIN/1.7M2
GLUCOSE BLDC GLUCOMTR-MCNC: 109 MG/DL (ref 70–99)
GLUCOSE BLDC GLUCOMTR-MCNC: 170 MG/DL (ref 70–99)
GLUCOSE SERPL-MCNC: 102 MG/DL (ref 70–99)
HCT VFR BLD AUTO: 44.7 % (ref 35–47)
HGB BLD-MCNC: 14.5 G/DL (ref 11.7–15.7)
LIPASE SERPL-CCNC: 88 U/L (ref 73–393)
MCH RBC QN AUTO: 29.7 PG (ref 26.5–33)
MCHC RBC AUTO-ENTMCNC: 32.4 G/DL (ref 31.5–36.5)
MCV RBC AUTO: 92 FL (ref 78–100)
PLATELET # BLD AUTO: 246 10E9/L (ref 150–450)
POTASSIUM SERPL-SCNC: 2.9 MMOL/L (ref 3.4–5.3)
PROT SERPL-MCNC: 7.7 G/DL (ref 6.8–8.8)
RBC # BLD AUTO: 4.88 10E12/L (ref 3.8–5.2)
SODIUM SERPL-SCNC: 140 MMOL/L (ref 133–144)
WBC # BLD AUTO: 10.8 10E9/L (ref 4–11)

## 2018-11-29 PROCEDURE — 40000170 ZZH STATISTIC PRE-PROCEDURE ASSESSMENT II: Performed by: INTERNAL MEDICINE

## 2018-11-29 PROCEDURE — A9270 NON-COVERED ITEM OR SERVICE: HCPCS | Performed by: INTERNAL MEDICINE

## 2018-11-29 PROCEDURE — 82962 GLUCOSE BLOOD TEST: CPT

## 2018-11-29 PROCEDURE — 80053 COMPREHEN METABOLIC PANEL: CPT | Performed by: INTERNAL MEDICINE

## 2018-11-29 PROCEDURE — 25000132 ZZH RX MED GY IP 250 OP 250 PS 637: Mod: GY | Performed by: STUDENT IN AN ORGANIZED HEALTH CARE EDUCATION/TRAINING PROGRAM

## 2018-11-29 PROCEDURE — 37000009 ZZH ANESTHESIA TECHNICAL FEE, EACH ADDTL 15 MIN: Performed by: INTERNAL MEDICINE

## 2018-11-29 PROCEDURE — C1769 GUIDE WIRE: HCPCS | Performed by: INTERNAL MEDICINE

## 2018-11-29 PROCEDURE — 37000008 ZZH ANESTHESIA TECHNICAL FEE, 1ST 30 MIN: Performed by: INTERNAL MEDICINE

## 2018-11-29 PROCEDURE — 36000053 ZZH SURGERY LEVEL 2 EA 15 ADDTL MIN - UMMC: Performed by: INTERNAL MEDICINE

## 2018-11-29 PROCEDURE — 71000027 ZZH RECOVERY PHASE 2 EACH 15 MINS: Performed by: INTERNAL MEDICINE

## 2018-11-29 PROCEDURE — A9270 NON-COVERED ITEM OR SERVICE: HCPCS | Mod: GY | Performed by: STUDENT IN AN ORGANIZED HEALTH CARE EDUCATION/TRAINING PROGRAM

## 2018-11-29 PROCEDURE — 36000055 ZZH SURGERY LEVEL 2 W FLUORO 1ST 30 MIN - UMMC: Performed by: INTERNAL MEDICINE

## 2018-11-29 PROCEDURE — 27210794 ZZH OR GENERAL SUPPLY STERILE: Performed by: INTERNAL MEDICINE

## 2018-11-29 PROCEDURE — 25000128 H RX IP 250 OP 636: Performed by: STUDENT IN AN ORGANIZED HEALTH CARE EDUCATION/TRAINING PROGRAM

## 2018-11-29 PROCEDURE — 25000125 ZZHC RX 250: Performed by: INTERNAL MEDICINE

## 2018-11-29 PROCEDURE — 82150 ASSAY OF AMYLASE: CPT | Performed by: INTERNAL MEDICINE

## 2018-11-29 PROCEDURE — 85027 COMPLETE CBC AUTOMATED: CPT | Performed by: INTERNAL MEDICINE

## 2018-11-29 PROCEDURE — 83690 ASSAY OF LIPASE: CPT | Performed by: INTERNAL MEDICINE

## 2018-11-29 PROCEDURE — 71000014 ZZH RECOVERY PHASE 1 LEVEL 2 FIRST HR: Performed by: INTERNAL MEDICINE

## 2018-11-29 PROCEDURE — 25000566 ZZH SEVOFLURANE, EA 15 MIN: Performed by: INTERNAL MEDICINE

## 2018-11-29 PROCEDURE — 40000277 XR SURGERY CARM FLUORO LESS THAN 5 MIN W STILLS: Mod: TC

## 2018-11-29 PROCEDURE — 25000125 ZZHC RX 250: Performed by: STUDENT IN AN ORGANIZED HEALTH CARE EDUCATION/TRAINING PROGRAM

## 2018-11-29 PROCEDURE — 25500064 ZZH RX 255 OP 636: Performed by: INTERNAL MEDICINE

## 2018-11-29 PROCEDURE — 36415 COLL VENOUS BLD VENIPUNCTURE: CPT | Performed by: INTERNAL MEDICINE

## 2018-11-29 RX ORDER — LABETALOL HYDROCHLORIDE 5 MG/ML
10 INJECTION, SOLUTION INTRAVENOUS
Status: DISCONTINUED | OUTPATIENT
Start: 2018-11-29 | End: 2018-11-29 | Stop reason: HOSPADM

## 2018-11-29 RX ORDER — LIDOCAINE HYDROCHLORIDE 20 MG/ML
INJECTION, SOLUTION INFILTRATION; PERINEURAL PRN
Status: DISCONTINUED | OUTPATIENT
Start: 2018-11-29 | End: 2018-11-29

## 2018-11-29 RX ORDER — NALOXONE HYDROCHLORIDE 0.4 MG/ML
.1-.4 INJECTION, SOLUTION INTRAMUSCULAR; INTRAVENOUS; SUBCUTANEOUS
Status: DISCONTINUED | OUTPATIENT
Start: 2018-11-29 | End: 2018-11-29 | Stop reason: HOSPADM

## 2018-11-29 RX ORDER — SODIUM CHLORIDE, SODIUM LACTATE, POTASSIUM CHLORIDE, CALCIUM CHLORIDE 600; 310; 30; 20 MG/100ML; MG/100ML; MG/100ML; MG/100ML
INJECTION, SOLUTION INTRAVENOUS CONTINUOUS
Status: DISCONTINUED | OUTPATIENT
Start: 2018-11-29 | End: 2018-11-29 | Stop reason: HOSPADM

## 2018-11-29 RX ORDER — SACCHAROMYCES BOULARDII 250 MG
250 CAPSULE ORAL 2 TIMES DAILY
COMMUNITY
End: 2019-12-10

## 2018-11-29 RX ORDER — ALBUTEROL SULFATE 90 UG/1
AEROSOL, METERED RESPIRATORY (INHALATION) PRN
Status: DISCONTINUED | OUTPATIENT
Start: 2018-11-29 | End: 2018-11-29

## 2018-11-29 RX ORDER — PROPOFOL 10 MG/ML
INJECTION, EMULSION INTRAVENOUS PRN
Status: DISCONTINUED | OUTPATIENT
Start: 2018-11-29 | End: 2018-11-29

## 2018-11-29 RX ORDER — DEXAMETHASONE SODIUM PHOSPHATE 4 MG/ML
INJECTION, SOLUTION INTRA-ARTICULAR; INTRALESIONAL; INTRAMUSCULAR; INTRAVENOUS; SOFT TISSUE PRN
Status: DISCONTINUED | OUTPATIENT
Start: 2018-11-29 | End: 2018-11-29

## 2018-11-29 RX ORDER — FENTANYL CITRATE 50 UG/ML
25-50 INJECTION, SOLUTION INTRAMUSCULAR; INTRAVENOUS
Status: DISCONTINUED | OUTPATIENT
Start: 2018-11-29 | End: 2018-11-29 | Stop reason: HOSPADM

## 2018-11-29 RX ORDER — IOPAMIDOL 510 MG/ML
INJECTION, SOLUTION INTRAVASCULAR PRN
Status: DISCONTINUED | OUTPATIENT
Start: 2018-11-29 | End: 2018-11-29 | Stop reason: HOSPADM

## 2018-11-29 RX ORDER — HYDROMORPHONE HYDROCHLORIDE 1 MG/ML
.3-.5 INJECTION, SOLUTION INTRAMUSCULAR; INTRAVENOUS; SUBCUTANEOUS EVERY 10 MIN PRN
Status: DISCONTINUED | OUTPATIENT
Start: 2018-11-29 | End: 2018-11-29 | Stop reason: HOSPADM

## 2018-11-29 RX ORDER — ONDANSETRON 2 MG/ML
4 INJECTION INTRAMUSCULAR; INTRAVENOUS EVERY 30 MIN PRN
Status: DISCONTINUED | OUTPATIENT
Start: 2018-11-29 | End: 2018-11-29 | Stop reason: HOSPADM

## 2018-11-29 RX ORDER — LIDOCAINE 40 MG/G
CREAM TOPICAL
Status: DISCONTINUED | OUTPATIENT
Start: 2018-11-29 | End: 2018-11-29 | Stop reason: HOSPADM

## 2018-11-29 RX ORDER — INDOMETHACIN 50 MG/1
100 SUPPOSITORY RECTAL
Status: COMPLETED | OUTPATIENT
Start: 2018-11-29 | End: 2018-11-29

## 2018-11-29 RX ORDER — ONDANSETRON 4 MG/1
4 TABLET, ORALLY DISINTEGRATING ORAL EVERY 30 MIN PRN
Status: DISCONTINUED | OUTPATIENT
Start: 2018-11-29 | End: 2018-11-29 | Stop reason: HOSPADM

## 2018-11-29 RX ORDER — MEPERIDINE HYDROCHLORIDE 50 MG/ML
12.5 INJECTION INTRAMUSCULAR; INTRAVENOUS; SUBCUTANEOUS
Status: DISCONTINUED | OUTPATIENT
Start: 2018-11-29 | End: 2018-11-29 | Stop reason: HOSPADM

## 2018-11-29 RX ORDER — SODIUM CHLORIDE, SODIUM LACTATE, POTASSIUM CHLORIDE, CALCIUM CHLORIDE 600; 310; 30; 20 MG/100ML; MG/100ML; MG/100ML; MG/100ML
INJECTION, SOLUTION INTRAVENOUS CONTINUOUS PRN
Status: DISCONTINUED | OUTPATIENT
Start: 2018-11-29 | End: 2018-11-29

## 2018-11-29 RX ORDER — FENTANYL CITRATE 50 UG/ML
INJECTION, SOLUTION INTRAMUSCULAR; INTRAVENOUS PRN
Status: DISCONTINUED | OUTPATIENT
Start: 2018-11-29 | End: 2018-11-29

## 2018-11-29 RX ORDER — FLUMAZENIL 0.1 MG/ML
0.2 INJECTION, SOLUTION INTRAVENOUS
Status: DISCONTINUED | OUTPATIENT
Start: 2018-11-29 | End: 2018-11-29 | Stop reason: HOSPADM

## 2018-11-29 RX ORDER — ONDANSETRON 2 MG/ML
INJECTION INTRAMUSCULAR; INTRAVENOUS PRN
Status: DISCONTINUED | OUTPATIENT
Start: 2018-11-29 | End: 2018-11-29

## 2018-11-29 RX ORDER — HYDRALAZINE HYDROCHLORIDE 20 MG/ML
2.5-5 INJECTION INTRAMUSCULAR; INTRAVENOUS EVERY 10 MIN PRN
Status: DISCONTINUED | OUTPATIENT
Start: 2018-11-29 | End: 2018-11-29 | Stop reason: HOSPADM

## 2018-11-29 RX ADMIN — PHENYLEPHRINE HYDROCHLORIDE 100 MCG: 10 INJECTION, SOLUTION INTRAMUSCULAR; INTRAVENOUS; SUBCUTANEOUS at 10:26

## 2018-11-29 RX ADMIN — FENTANYL CITRATE 50 MCG: 50 INJECTION, SOLUTION INTRAMUSCULAR; INTRAVENOUS at 09:59

## 2018-11-29 RX ADMIN — FENTANYL CITRATE 50 MCG: 50 INJECTION, SOLUTION INTRAMUSCULAR; INTRAVENOUS at 10:14

## 2018-11-29 RX ADMIN — PHENYLEPHRINE HYDROCHLORIDE 100 MCG: 10 INJECTION, SOLUTION INTRAMUSCULAR; INTRAVENOUS; SUBCUTANEOUS at 10:15

## 2018-11-29 RX ADMIN — ALBUTEROL SULFATE 6 PUFF: 90 AEROSOL, METERED RESPIRATORY (INHALATION) at 10:41

## 2018-11-29 RX ADMIN — INDOMETHACIN 100 MG: 50 SUPPOSITORY RECTAL at 10:41

## 2018-11-29 RX ADMIN — SIMETHICONE 2 ML: 63.3; 3.7 SOLUTION/ DROPS ORAL at 10:30

## 2018-11-29 RX ADMIN — PROPOFOL 150 MG: 10 INJECTION, EMULSION INTRAVENOUS at 09:59

## 2018-11-29 RX ADMIN — LIDOCAINE HYDROCHLORIDE 80 MG: 20 INJECTION, SOLUTION INFILTRATION; PERINEURAL at 09:59

## 2018-11-29 RX ADMIN — SUGAMMADEX 200 MG: 100 INJECTION, SOLUTION INTRAVENOUS at 10:40

## 2018-11-29 RX ADMIN — DEXAMETHASONE SODIUM PHOSPHATE 8 MG: 4 INJECTION, SOLUTION INTRA-ARTICULAR; INTRALESIONAL; INTRAMUSCULAR; INTRAVENOUS; SOFT TISSUE at 10:23

## 2018-11-29 RX ADMIN — ONDANSETRON 4 MG: 2 INJECTION INTRAMUSCULAR; INTRAVENOUS at 10:34

## 2018-11-29 RX ADMIN — SODIUM CHLORIDE, POTASSIUM CHLORIDE, SODIUM LACTATE AND CALCIUM CHLORIDE: 600; 310; 30; 20 INJECTION, SOLUTION INTRAVENOUS at 09:44

## 2018-11-29 RX ADMIN — IOPAMIDOL 20 ML: 510 INJECTION, SOLUTION INTRAVASCULAR at 10:34

## 2018-11-29 RX ADMIN — ROCURONIUM BROMIDE 70 MG: 10 INJECTION INTRAVENOUS at 09:59

## 2018-11-29 NOTE — ANESTHESIA CARE TRANSFER NOTE
Patient: Marlin Harman    Procedure(s):  Endoscopic Retrograde Cholangiopancreatography with stent removal and bile duct sweep    Diagnosis: Abnormal Results Of Liver Function Studies   Diagnosis Additional Information: No value filed.    Anesthesia Type:   General     Note:  Airway :Face Mask  Patient transferred to:PACU  Comments: Extubated in OR 18, transferred to PACU with oxygen, hemodynamically stable. Upon arrival to PACU, pain is nil, no nausea. SpO2 98% on 8L O2 via face mask.     Homer Sin Report: Identifed the Patient, Identified the Reponsible Provider, Reviewed the pertinent medical history, Discussed the surgical course, Reviewed Intra-OP anesthesia mangement and issues during anesthesia, Set expectations for post-procedure period and Allowed opportunity for questions and acknowledgement of understanding      Vitals: (Last set prior to Anesthesia Care Transfer)    CRNA VITALS  11/29/2018 1020 - 11/29/2018 1103      11/29/2018             Resp Rate (observed): 11                Electronically Signed By: Homer Rodriguez MD  November 29, 2018  11:03 AM

## 2018-11-29 NOTE — IP AVS SNAPSHOT
Same Day Surgery [address] [address]    Phone:  [phone]                                       After Visit Summary   11/29/2018    Marlin Harman    MRN: [id number]           After Visit Summary Signature Page     I have received my discharge instructions, and my questions have been answered. I have discussed any challenges I see with this plan with the nurse or doctor.    ..........................................................................................................................................  Patient/Patient Representative Signature      ..........................................................................................................................................  Patient Representative Print Name and Relationship to Patient    ..................................................               ................................................  Date                                   Time    ..........................................................................................................................................  Reviewed by Signature/Title    ...................................................              ..............................................  Date                                               Time          22EPIC Rev 08/18

## 2018-11-29 NOTE — BRIEF OP NOTE
ERCP 11/29/2018  9:39 AM [address] [place] [address] [phone]     Endoscopy Department   _______________________________________________________________________________   Patient Name: Marlin Harman         Procedure Date: 11/29/2018 9:39 AM   MRN: [id number]                       Account Number: [id number]   YOB: 1955              Admit Type: Outpatient   Age: 62                               Room: OR   Gender: Female                        Note Status: Finalized   Attending MD: Manny Cooper MD       Pause for the Cause: time out performed   Total Sedation Time:                     _______________________________________________________________________________       Procedure:           ERCP   Indications:         Follow-up of bile leak; 63 y/o female with a prior                        history of large CBD stone s/p spyglass EHL and                        subsequent cholecystectomy; difficult cholecystectomy by                        Dr. Rendon and there was concern for a possible                        bile leak. A biliary stent was placed by my partner, Dr. Mckeon, at the time of the cholecystectomy. Patient                        doing well since then without any concerns for ongoing                        bile leak.   Providers:           Manny Cooper MD   Referring MD:        Alex Rendon MD, Amish Mckeon MD   Medicines:           General Anesthesia, Indomethacin 100 mg ME   Complications:       No immediate complications. Estimated blood loss:                        Minimal.   _______________________________________________________________________________   Procedure:           Pre-Anesthesia Assessment:                        - Prior to the procedure, a History and Physical was                        performed, and patient medications and allergies were                         reviewed. The patient is competent. The risks and                        benefits of the procedure and the sedation options and                        risks were discussed with the patient. All questions                        were answered and informed consent was obtained. Patient                        identification and proposed procedure were verified by                        the physician, the nurse, the anesthesiologist and the                        anesthetist in the procedure room. Mental Status                        Examination: normal. Airway Examination: normal                        oropharyngeal airway and neck mobility. Respiratory                        Examination: clear to auscultation. CV Examination:                        normal. Prophylactic Antibiotics: The patient does not                        require prophylactic antibiotics. Prior Anticoagulants:                        The patient has taken no previous anticoagulant or                        antiplatelet agents. ASA Grade Assessment: III - A                        patient with severe systemic disease. After reviewing                         the risks and benefits, the patient was deemed in                        satisfactory condition to undergo the procedure. The                        anesthesia plan was to use general anesthesia.                        Immediately prior to administration of medications, the                        patient was re-assessed for adequacy to receive                        sedatives. The heart rate, respiratory rate, oxygen                        saturations, blood pressure, adequacy of pulmonary                        ventilation, and response to care were monitored                        throughout the procedure. The physical status of the                        patient was re-assessed after the procedure.                        After obtaining informed consent, the scope was passed                         under direct vision. Throughout the procedure, the                        patient's blood pressure, pulse, and oxygen saturations                        were monitored continuously. The Duodenoscope was                        introduced through the mouth, and used to inject                        contrast into and used to inject contrast into the bile                        duct. The ERCP was accomplished without difficulty. The                        patient tolerated the procedure well.                                                                                     Findings:        A biliary stent was visible on the  film. A  film of the        abdomen was obtained. Surgical clips, consistent with a previous        cholecystectomy, were seen in the area of the right upper quadrant of        the abdomen. The esophagus was successfully intubated under direct        vision. The scope was advanced from the mouth to the duodenum. The        pharynx, larynx and associated structures, as well as the upper GI        tract, were normal. One plastic stent originating in the common bile        duct was emerging from the major papilla. The stent was visibly patent.        A biliary sphincterotomy had been performed. The sphincterotomy appeared        open. One stent was removed from the common bile duct using a snare. The        bile duct was deeply cannulated with the 18 mm balloon. Contrast was        injected. I personally interpreted the bile duct images. There was brisk        flow of contrast through the ducts. Image quality was excellent.        Contrast extended to the entire biliary tree. Visiglide wire was passed        into the biliary tree. The biliary tree was swept with an 18 mm balloon        starting at the bifurcation. Debris was swept from the duct.        Preparations were made for cholangiography using balloon occlusion        technique. A balloon-tipped catheter was advanced into the  common bile        duct. The balloon was inflated to 15 mm in size. Contrast was then        injected into the biliary tree and opacified the left and right hepatic        ducts and all intrahepatic branches. There was no extravasation of        contrast.                                                                                     Impression:          - One visibly patent stent from the common bile duct was                        seen in the major papilla.                        - Prior biliary sphincterotomy appeared open.                        - One stent was removed from the common bile duct.                        - The biliary tree was swept and stent related debris                        was found.                        - Balloon occlusion cholangiogram did not demonstrate                        any persistent leak                        - No stent was replaced   Recommendation:      - Discharge patient to home (ambulatory).                        - Do not anticipate any further ERCP procedures will be                        needed. No follow up needed.                                                                                       Manny Cooper MD

## 2018-11-29 NOTE — DISCHARGE INSTRUCTIONS
[facility]  Same-Day Surgery   Adult Discharge Orders & Instructions     For 24 hours after surgery    1. Get plenty of rest.  A responsible adult must stay with you for at least 24 hours after you leave the hospital.   2. Do not drive or use heavy equipment.  If you have weakness or tingling, don't drive or use heavy equipment until this feeling goes away.  3. Do not drink alcohol.  4. Avoid strenuous or risky activities.  Ask for help when climbing stairs.   5. You may feel lightheaded.  IF so, sit for a few minutes before standing.  Have someone help you get up.   6. If you have nausea (feel sick to your stomach): Drink only clear liquids such as apple juice, ginger ale, broth or 7-Up.  Rest may also help.  Be sure to drink enough fluids.  Move to a regular diet as you feel able.  7. You may have a slight fever. Call the doctor if your fever is over 100 F (37.7 C) (taken under the tongue) or lasts longer than 24 hours.  8. You may have a dry mouth, a sore throat, muscle aches or trouble sleeping.  These should go away after 24 hours.  9. Do not make important or legal decisions.   Call your doctor for any of the followin.  Signs of infection (fever, growing tenderness at the surgery site, a large amount of drainage or bleeding, severe pain, foul-smelling drainage, redness, swelling).    2. It has been over 8 to 10 hours since surgery and you are still not able to urinate (pass water).    3.  Headache for over 24 hours.      To contact a doctor, call Dr. Cooper at [phone] at the Gastroenterology Clinic from 8 am till 5 pm or:    x   [phone] and ask for the resident on call for   Gastroenterology(answered 24 hours a day)  x   Emergency Department:    [facility]: [phone]       (TTY for hearing impaired: [phone])

## 2018-11-29 NOTE — IP AVS SNAPSHOT
MRN:[id number]                      After Visit Summary   11/29/2018    Marlin Harman    MRN: [id number]           Thank you!     Thank you for choosing [facility] for your care. Our goal is always to provide you with excellent care. Hearing back from our patients is one way we can continue to improve our services. Please take a few minutes to complete the written survey that you may receive in the mail after you visit with us. Thank you!        Patient Information     Date Of Birth          1955        About your hospital stay     You were admitted on:  November 29, 2018 You last received care in the:  Same Day Surgery [facility]    You were discharged on:  November 29, 2018       Who to Call     For medical emergencies, please call 911.  For non-urgent questions about your medical care, please call your primary care provider or clinic, [phone]  For questions related to your surgery, please call your surgery clinic        Attending Provider     Provider Specialty    Manny Cooper MD Gastroenterology       Primary Care Provider Office Phone # Fax #    Zechariah Ur MD Matthew [phone]      After Care Instructions     Discharge Instructions       Resume pre procedure diet            Discharge Instructions       Restart home medications.                  Further instructions from your care team       [facility]  Same-Day Surgery   Adult Discharge Orders & Instructions     For 24 hours after surgery    1. Get plenty of rest.  A responsible adult must stay with you for at least 24 hours after you leave the hospital.   2. Do not drive or use heavy equipment.  If you have weakness or tingling, don't drive or use heavy equipment until this feeling goes away.  3. Do not drink alcohol.  4. Avoid strenuous or risky activities.  Ask for help when climbing stairs.   5. You may feel lightheaded.  IF so, sit for a few minutes before standing.   Have someone help you get up.   6. If you have nausea (feel sick to your stomach): Drink only clear liquids such as apple juice, ginger ale, broth or 7-Up.  Rest may also help.  Be sure to drink enough fluids.  Move to a regular diet as you feel able.  7. You may have a slight fever. Call the doctor if your fever is over 100 F (37.7 C) (taken under the tongue) or lasts longer than 24 hours.  8. You may have a dry mouth, a sore throat, muscle aches or trouble sleeping.  These should go away after 24 hours.  9. Do not make important or legal decisions.   Call your doctor for any of the followin.  Signs of infection (fever, growing tenderness at the surgery site, a large amount of drainage or bleeding, severe pain, foul-smelling drainage, redness, swelling).    2. It has been over 8 to 10 hours since surgery and you are still not able to urinate (pass water).    3.  Headache for over 24 hours.      To contact a doctor, call Dr. Cooper at [phone] at the Gastroenterology Clinic from 8 am till 5 pm or:    x   [phone] and ask for the resident on call for   Gastroenterology(answered 24 hours a day)  x   Emergency Department:    [facility]: [phone]       (TTY for hearing impaired: [phone])              Additional Information     If you use hormonal birth control (such as the pill, patch, ring or implants): You'll need a second form of birth control for 7 days (condoms, a diaphragm or contraceptive foam). While in the hospital, you received a medicine called Bridion. Your normal birth control will not work as well for a week after taking this medicine.          Pending Results     No orders found from 2018 to 2018.            Admission Information     Date & Time Provider Department Dept. Phone    2018 Manny Cooper MD Same Day Surgery [facility] [phone]      Your Vitals Were     Blood Pressure Pulse Temperature Respirations Height Weight    142/84 81 97.6  F (36.4  " C) (Oral) 18 1.6 m (5' 3\") 104.8 kg (231 lb 0.7 oz)    Pulse Oximetry BMI (Body Mass Index)                99% 40.93 kg/m2          Care EveryWhere ID     This is your Care EveryWhere ID. This could be used by other organizations to access your [facility] medical records  [id number]        Equal Access to Services     GUEVARADEEPALI FRANCISCO J : Hadii ciera holloway haddiana Sochan, waaxda luqadaha, qaybta kaalmada adebrandonyada, paulo weaver janesjacqueline pazgakeron bland . So [facility] [phone].    ATENCIÓN: Si habla español, tiene a lopez disposición servicios gratuitos de asistencia lingüística. Cayden al [phone].    We comply with applicable federal civil rights laws and [place] laws. We do not discriminate on the basis of race, color, national origin, age, disability, sex, sexual orientation, or gender identity.               Review of your medicines      CONTINUE these medicines which may have CHANGED, or have new prescriptions. If we are uncertain of the size of tablets/capsules you have at home, strength may be listed as something that might have changed.        Dose / Directions    cyclobenzaprine 10 MG tablet   Commonly known as:  FLEXERIL   This may have changed:    - when to take this  - reasons to take this   Used for:  S/P cholecystectomy        Dose:  10 mg   Take 1 tablet (10 mg) by mouth At Bedtime   Quantity:  30 tablet   Refills:  1         CONTINUE these medicines which have NOT CHANGED        Dose / Directions    albuterol 108 (90 Base) MCG/ACT inhaler   Commonly known as:  PROAIR HFA/PROVENTIL HFA/VENTOLIN HFA   Used for:  Chronic seasonal allergic rhinitis due to pollen        Dose:  2 puff   Inhale 2 puffs into the lungs every 6 hours as needed for shortness of breath / dyspnea or wheezing   Quantity:  1 Inhaler   Refills:  1       aspirin 81 MG chewable tablet   Commonly known as:  ASA        Dose:  81 mg   Take 81 mg by mouth daily   Refills:  0       diphenhydrAMINE 25 MG tablet   Commonly known as:  BENADRYL    "     Dose:  25 mg   Take 25 mg by mouth nightly as needed for itching or allergies   Refills:  0       folic acid 1 MG tablet   Commonly known as:  FOLVITE        Dose:  1 mg   Take 1 mg by mouth daily   Refills:  0       Glucosamine Sulfate 500 MG Tabs        Dose:  1 tablet   Take 1 tablet by mouth daily   Refills:  0       hydrochlorothiazide 25 MG tablet   Commonly known as:  HYDRODIURIL   Used for:  Benign essential hypertension        Dose:  25 mg   Take 1 tablet (25 mg) by mouth daily   Quantity:  14 tablet   Refills:  0       loratadine 10 MG tablet   Commonly known as:  CLARITIN        Dose:  10 mg   Take 10 mg by mouth daily   Refills:  0       saccharomyces boulardii 250 MG capsule   Commonly known as:  FLORASTOR        Dose:  250 mg   Take 250 mg by mouth 2 times daily   Refills:  0       vitamin B complex with vitamin C tablet        Dose:  1 tablet   Take 1 tablet by mouth daily   Refills:  0       vitamin E 400 units (180 mg) capsule   Commonly known as:  TOCOPHEROL        Dose:  400 Units   Take 400 Units by mouth daily   Refills:  0                Protect others around you: Learn how to safely use, store and throw away your medicines at www.disposemymeds.org.             Medication List: This is a list of all your medications and when to take them. Check marks below indicate your daily home schedule. Keep this list as a reference.      Medications           Morning Afternoon Evening Bedtime As Needed    albuterol 108 (90 Base) MCG/ACT inhaler   Commonly known as:  PROAIR HFA/PROVENTIL HFA/VENTOLIN HFA   Inhale 2 puffs into the lungs every 6 hours as needed for shortness of breath / dyspnea or wheezing   Last time this was given:  6 puffs on 11/29/2018 10:41 AM                                aspirin 81 MG chewable tablet   Commonly known as:  ASA   Take 81 mg by mouth daily                                cyclobenzaprine 10 MG tablet   Commonly known as:  FLEXERIL   Take 1 tablet (10 mg) by mouth At  Bedtime                                diphenhydrAMINE 25 MG tablet   Commonly known as:  BENADRYL   Take 25 mg by mouth nightly as needed for itching or allergies                                folic acid 1 MG tablet   Commonly known as:  FOLVITE   Take 1 mg by mouth daily                                Glucosamine Sulfate 500 MG Tabs   Take 1 tablet by mouth daily                                hydrochlorothiazide 25 MG tablet   Commonly known as:  HYDRODIURIL   Take 1 tablet (25 mg) by mouth daily                                loratadine 10 MG tablet   Commonly known as:  CLARITIN   Take 10 mg by mouth daily                                saccharomyces boulardii 250 MG capsule   Commonly known as:  FLORASTOR   Take 250 mg by mouth 2 times daily                                vitamin B complex with vitamin C tablet   Take 1 tablet by mouth daily                                vitamin E 400 units (180 mg) capsule   Commonly known as:  TOCOPHEROL   Take 400 Units by mouth daily

## 2018-11-29 NOTE — ANESTHESIA POSTPROCEDURE EVALUATION
Anesthesia POST Procedure Evaluation    Patient: Marlin Harman   MRN:     [id number] Gender:   female   Age:    62 year old :      1955        Preoperative Diagnosis: Abnormal Results Of Liver Function Studies    Procedure(s):  Endoscopic Retrograde Cholangiopancreatography with stent removal and bile duct sweep   Postop Comments: No value filed.       Anesthesia Type:  General    Reportable Event: NO     PAIN: Uncomplicated   Sign Out status: Comfortable, Well controlled pain     PONV: No PONV   Sign Out status:  No Nausea or Vomiting     Neuro/Psych: Uneventful perioperative course   Sign Out Status: Preoperative baseline     Airway/Resp.: Uneventful perioperative course   Sign Out Status: Resp. Status within EXPECTED Parameters     CV: Uneventful perioperative course   Sign Out status: Appropriate BP and perfusion indices; Appropriate HR/Rhythm     Disposition:   Sign Out in:  PACU  Recovery Course: Uneventful  Follow-Up: Not required           Last Anesthesia Record Vitals:  CRNA VITALS  2018 1020 - 2018 1120      2018             Resp Rate (observed): 11          Last PACU/Preop Vitals:  Vitals:    18 1155 18 1200 18 1230   BP: 142/84 148/79 (!) 146/92   Pulse:      Resp: 18 16 16   Temp: 36.4  C (97.6  F)  36.7  C (98  F)   SpO2: 99%  93%         Electronically Signed By: Cristy Black MD, 2018, 1:45 PM

## 2018-11-29 NOTE — OR NURSING
Dr. Black called for a sign-out. Pt can to to Phase II. Dr. Cooper talked to pt in PACU. No need to see pt in Phase II.

## 2018-12-07 ENCOUNTER — PATIENT OUTREACH (OUTPATIENT)
Dept: CARE COORDINATION | Facility: CLINIC | Age: 63
End: 2018-12-07

## 2018-12-07 ASSESSMENT — ACTIVITIES OF DAILY LIVING (ADL): DEPENDENT_IADLS:: INDEPENDENT

## 2018-12-07 NOTE — PROGRESS NOTES
Clinic Care Coordination Contact  Outreach    Pt reporting that she is doing well. Her surgery went will with no complications.      She denied any needs right now and will not begin looking for any housing until after the new year.      Plan:  Pt to continue with any follow up appointments as scheduled. Sw to call in about 6 weeks.     JOSS Kang, Clinic Care Coordinator 12/7/2018   11:27 AM  [phone]

## 2019-01-22 ENCOUNTER — PATIENT OUTREACH (OUTPATIENT)
Dept: CARE COORDINATION | Facility: CLINIC | Age: 64
End: 2019-01-22

## 2019-01-22 ASSESSMENT — ACTIVITIES OF DAILY LIVING (ADL): DEPENDENT_IADLS:: INDEPENDENT

## 2019-01-22 NOTE — LETTER
[facility]  [address] [place] [address]  [phone]      1/25/2019      Marlin Harman  [address] [place] [address]      Dear  Marlin,      I have been attempting to reach you since our last contact. I would like to continue to work with you on the goals from our last conversation and provide the support you may need. I would appreciate if you would give me a call at [phone] and let me know if you would like to continue working together. I know that there are many things that can affect our ability to communicate and hope we can plan better moving forward.     All of us at [facility] are invested in your health and are here to assist you in meeting your goals.     Sincerely,        Catrachita Ha, [facility]  Clinic Care Coordination  [phone]

## 2019-01-22 NOTE — PROGRESS NOTES
Clinic Care Coordination Contact  [facility]/Voicemail    Referral Source: PCP  Clinical Data: Care Coordinator Outreach  Outreach attempted x 1.  Left message on voicemail with call back information and requested return call.  Plan:  Care Coordinator will try to reach patient again in 3-5 business days.  JOSS Kang, Clinic Care Coordinator 1/22/2019   2:56 PM  [phone]

## 2019-01-25 ASSESSMENT — ACTIVITIES OF DAILY LIVING (ADL): DEPENDENT_IADLS:: INDEPENDENT

## 2019-01-25 NOTE — PROGRESS NOTES
Clinic Care Coordination Contact  [facility]/Voicemail    Referral Source: PCP  Clinical Data: Care Coordinator Outreach  Outreach attempted x 2.  Left message on voicemail with call back information and requested return call.  Plan: Care Coordinator will send unable to contact letter. Care Coordinator will review chart in about two weeks, if no contact and no new concerns will close to care coordination at that time.  JOSS Kang, Clinic Care Coordinator 1/25/2019   9:42 AM  [phone]

## 2019-02-08 ASSESSMENT — ACTIVITIES OF DAILY LIVING (ADL): DEPENDENT_IADLS:: INDEPENDENT

## 2019-02-08 NOTE — PROGRESS NOTES
Clinic Care Coordination Contact    Pt has not returned phone call from two message and letter sent.  Will close to care coordination at this time.     JOSS Kang, Clinic Care Coordinator 2/8/2019   8:50 AM  [phone]

## 2019-04-25 DIAGNOSIS — Z90.49 S/P CHOLECYSTECTOMY: ICD-10-CM

## 2019-04-25 DIAGNOSIS — I10 BENIGN ESSENTIAL HYPERTENSION: ICD-10-CM

## 2019-04-25 RX ORDER — HYDROCHLOROTHIAZIDE 25 MG/1
TABLET ORAL
Qty: 30 TABLET | Refills: 0 | Status: SHIPPED | OUTPATIENT
Start: 2019-04-25 | End: 2019-05-31

## 2019-04-25 NOTE — TELEPHONE ENCOUNTER
Hydrochlorothiazide: Patient due for blood pressure follow up.  Medication is being filled for 1 time refill only due to:  needs bp visit with nurse.    Routing refill request to provider for review/approval because:  Drug not on the [facility] refill protocol     Dr. Castro, do you want patient to recheck potassium?     Potassium   Date Value Ref Range Status   11/29/2018 2.9 (L) 3.4 - 5.3 mmol/L Final

## 2019-04-25 NOTE — TELEPHONE ENCOUNTER
"Requested Prescriptions   Pending Prescriptions Disp Refills     hydrochlorothiazide (HYDRODIURIL) 25 MG tablet [Pharmacy Med Name: HYDROCHLOROT 25MG   TAB] 90 tablet 1     Sig: TAKE 1 TABLET BY MOUTH ONCE DAILY       Diuretics (Including Combos) Protocol Failed - 4/25/2019  9:28 AM        Failed - Blood pressure under 140/90 in past 12 months     BP Readings from Last 3 Encounters:   11/29/18 (!) 146/92   11/19/18 130/80   11/01/18 140/72                 Failed - Normal serum potassium on file in past 12 months     Recent Labs   Lab Test 11/29/18  0744   POTASSIUM 2.9*                    Passed - Recent (12 mo) or future (30 days) visit within the authorizing provider's specialty     Patient had office visit in the last 12 months or has a visit in the next 30 days with authorizing provider or within the authorizing provider's specialty.  See \"Patient Info\" tab in inbasket, or \"Choose Columns\" in Meds & Orders section of the refill encounter.              Passed - Medication is active on med list        Passed - Patient is age 18 or older        Passed - No active pregancy on record        Passed - Normal serum creatinine on file in past 12 months     Recent Labs   Lab Test 11/29/18  0744   CR 0.59              Passed - Normal serum sodium on file in past 12 months     Recent Labs   Lab Test 11/29/18  0744                 Passed - No positive pregnancy test in past 12 months        cyclobenzaprine (FLEXERIL) 10 MG tablet [Pharmacy Med Name: CYCLOBENZAPR 10MG   TAB] 30 tablet 1     Sig: TAKE 1 TABLET BY MOUTH ONCE DAILY AT BEDTIME       There is no refill protocol information for this order        Last Written Prescription Date:  10/3/18  Last Fill Quantity: 14,  # refills: 0   Last office visit: 11/19/2018 with prescribing provider:  Zechariah Castro     Future Office Visit:      "

## 2019-04-25 NOTE — TELEPHONE ENCOUNTER
"Requested Prescriptions   Pending Prescriptions Disp Refills     hydrochlorothiazide (HYDRODIURIL) 25 MG tablet [Pharmacy Med Name: HYDROCHLOROT 25MG   TAB] 90 tablet 1     Sig: TAKE 1 TABLET BY MOUTH ONCE DAILY       Diuretics (Including Combos) Protocol Failed - 4/25/2019  9:21 AM        Failed - Blood pressure under 140/90 in past 12 months     BP Readings from Last 3 Encounters:   11/29/18 (!) 146/92   11/19/18 130/80   11/01/18 140/72                 Failed - Normal serum potassium on file in past 12 months     Recent Labs   Lab Test 11/29/18  0744   POTASSIUM 2.9*                    Passed - Recent (12 mo) or future (30 days) visit within the authorizing provider's specialty     Patient had office visit in the last 12 months or has a visit in the next 30 days with authorizing provider or within the authorizing provider's specialty.  See \"Patient Info\" tab in inbasket, or \"Choose Columns\" in Meds & Orders section of the refill encounter.              Passed - Medication is active on med list        Passed - Patient is age 18 or older        Passed - No active pregancy on record        Passed - Normal serum creatinine on file in past 12 months     Recent Labs   Lab Test 11/29/18  0744   CR 0.59              Passed - Normal serum sodium on file in past 12 months     Recent Labs   Lab Test 11/29/18  0744                 Passed - No positive pregnancy test in past 12 months        cyclobenzaprine (FLEXERIL) 10 MG tablet [Pharmacy Med Name: CYCLOBENZAPR 10MG   TAB] 30 tablet 1     Sig: TAKE 1 TABLET BY MOUTH ONCE DAILY AT BEDTIME       There is no refill protocol information for this order        Last Written Prescription Date:  Flexeril-10/22/18  Last Fill Quantity: 30,  # refills: 1   Last office visit: 11/19/2018 with prescribing provider:      Future Office Visit:      Last Written Prescription Date:  Hydrochlorothiazide-10/3/18  Last Fill Quantity: 14,  # refills: 0   Last office visit: 11/19/2018 with " prescribing provider:      Future Office Visit:

## 2019-04-29 RX ORDER — CYCLOBENZAPRINE HCL 10 MG
10 TABLET ORAL
Qty: 30 TABLET | Refills: 1 | Status: SHIPPED | OUTPATIENT
Start: 2019-04-29 | End: 2019-05-31

## 2019-05-31 ENCOUNTER — OFFICE VISIT (OUTPATIENT)
Dept: FAMILY MEDICINE | Facility: CLINIC | Age: 64
End: 2019-05-31
Payer: MEDICARE

## 2019-05-31 VITALS
TEMPERATURE: 97.8 F | BODY MASS INDEX: 42.88 KG/M2 | RESPIRATION RATE: 18 BRPM | SYSTOLIC BLOOD PRESSURE: 134 MMHG | HEIGHT: 63 IN | HEART RATE: 80 BPM | WEIGHT: 242 LBS | DIASTOLIC BLOOD PRESSURE: 62 MMHG

## 2019-05-31 DIAGNOSIS — Z12.11 SPECIAL SCREENING FOR MALIGNANT NEOPLASMS, COLON: ICD-10-CM

## 2019-05-31 DIAGNOSIS — Z23 NEED FOR PNEUMOCOCCAL VACCINE: ICD-10-CM

## 2019-05-31 DIAGNOSIS — Z90.49 S/P CHOLECYSTECTOMY: ICD-10-CM

## 2019-05-31 DIAGNOSIS — Z13.1 SCREENING FOR DIABETES MELLITUS: ICD-10-CM

## 2019-05-31 DIAGNOSIS — Z72.0 TOBACCO USE: ICD-10-CM

## 2019-05-31 DIAGNOSIS — I10 BENIGN ESSENTIAL HYPERTENSION: Primary | ICD-10-CM

## 2019-05-31 DIAGNOSIS — S50.862A TICK BITE OF LEFT FOREARM, INITIAL ENCOUNTER: ICD-10-CM

## 2019-05-31 DIAGNOSIS — J30.1 CHRONIC SEASONAL ALLERGIC RHINITIS DUE TO POLLEN: ICD-10-CM

## 2019-05-31 DIAGNOSIS — E66.01 MORBID OBESITY WITH BMI OF 40.0-44.9, ADULT (H): ICD-10-CM
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
## 2019-05-31 LAB
ANION GAP SERPL CALCULATED.3IONS-SCNC: 5 MMOL/L (ref 3–14)
BUN SERPL-MCNC: 19 MG/DL (ref 7–30)
CALCIUM SERPL-MCNC: 9.5 MG/DL (ref 8.5–10.1)
CHLORIDE SERPL-SCNC: 105 MMOL/L (ref 94–109)
CHOLEST SERPL-MCNC: 204 MG/DL
CO2 SERPL-SCNC: 29 MMOL/L (ref 20–32)
CREAT SERPL-MCNC: 0.53 MG/DL (ref 0.52–1.04)
ERYTHROCYTE [DISTWIDTH] IN BLOOD BY AUTOMATED COUNT: 15 % (ref 10–15)
GFR SERPL CREATININE-BSD FRML MDRD: >90 ML/MIN/{1.73_M2}
GLUCOSE SERPL-MCNC: 112 MG/DL (ref 70–99)
HCT VFR BLD AUTO: 43.5 % (ref 35–47)
HDLC SERPL-MCNC: 52 MG/DL
HGB BLD-MCNC: 14.4 G/DL (ref 11.7–15.7)
LDLC SERPL CALC-MCNC: 124 MG/DL
MCH RBC QN AUTO: 29.3 PG (ref 26.5–33)
MCHC RBC AUTO-ENTMCNC: 33.1 G/DL (ref 31.5–36.5)
MCV RBC AUTO: 88 FL (ref 78–100)
NONHDLC SERPL-MCNC: 152 MG/DL
PLATELET # BLD AUTO: 223 10E9/L (ref 150–450)
POTASSIUM SERPL-SCNC: 3.4 MMOL/L (ref 3.4–5.3)
RBC # BLD AUTO: 4.92 10E12/L (ref 3.8–5.2)
SODIUM SERPL-SCNC: 139 MMOL/L (ref 133–144)
TRIGL SERPL-MCNC: 138 MG/DL
WBC # BLD AUTO: 10.6 10E9/L (ref 4–11)

## 2019-05-31 PROCEDURE — 85027 COMPLETE CBC AUTOMATED: CPT | Performed by: FAMILY MEDICINE

## 2019-05-31 PROCEDURE — G0009 ADMIN PNEUMOCOCCAL VACCINE: HCPCS | Performed by: FAMILY MEDICINE

## 2019-05-31 PROCEDURE — 80061 LIPID PANEL: CPT | Performed by: FAMILY MEDICINE

## 2019-05-31 PROCEDURE — 99215 OFFICE O/P EST HI 40 MIN: CPT | Mod: 25 | Performed by: FAMILY MEDICINE

## 2019-05-31 PROCEDURE — 80048 BASIC METABOLIC PNL TOTAL CA: CPT | Performed by: FAMILY MEDICINE

## 2019-05-31 PROCEDURE — 36415 COLL VENOUS BLD VENIPUNCTURE: CPT | Performed by: FAMILY MEDICINE

## 2019-05-31 PROCEDURE — 90670 PCV13 VACCINE IM: CPT | Performed by: FAMILY MEDICINE

## 2019-05-31 RX ORDER — HYDROCHLOROTHIAZIDE 25 MG/1
25 TABLET ORAL DAILY
Qty: 90 TABLET | Refills: 1 | Status: SHIPPED | OUTPATIENT
Start: 2019-05-31 | End: 2019-12-10

## 2019-05-31 RX ORDER — CYCLOBENZAPRINE HCL 10 MG
10 TABLET ORAL
Qty: 30 TABLET | Refills: 2 | Status: SHIPPED | OUTPATIENT
Start: 2019-05-31 | End: 2019-12-10

## 2019-05-31 RX ORDER — ALBUTEROL SULFATE 90 UG/1
2 AEROSOL, METERED RESPIRATORY (INHALATION) EVERY 6 HOURS PRN
Qty: 18 G | Refills: 3 | Status: SHIPPED | OUTPATIENT
Start: 2019-05-31 | End: 2019-12-10

## 2019-05-31 RX ORDER — DOXYCYCLINE 100 MG/1
100 CAPSULE ORAL 2 TIMES DAILY
Qty: 20 CAPSULE | Refills: 0 | Status: SHIPPED | OUTPATIENT
Start: 2019-05-31 | End: 2019-06-10

## 2019-05-31 ASSESSMENT — MIFFLIN-ST. JEOR: SCORE: 1611.83

## 2019-05-31 NOTE — LETTER
Shreya 3, 2019      Marlin Harman  [address] [place] [address]        Dear ,    We are writing to inform you of your test results.    Cholesterol levels are elevated.  Continue healthy lifestyle modifications and balanced diet.  Other lab results came back unremarkable. Let us know if there are any questions.       The 10-year ASCVD risk score (Gogo ALFONSO Jr., et al., 2013) is: 11.4%     Values used to calculate the score:       Age: 63 years       Sex: Female       Is Non- : No       Diabetic: No       Tobacco smoker: Yes       Systolic Blood Pressure: 134 mmHg       Is BP treated: No       HDL Cholesterol: 52 mg/dL       Total Cholesterol: 204 mg/dL     Resulted Orders   CBC with platelets   Result Value Ref Range    WBC 10.6 4.0 - 11.0 10e9/L    RBC Count 4.92 3.8 - 5.2 10e12/L    Hemoglobin 14.4 11.7 - 15.7 g/dL    Hematocrit 43.5 35.0 - 47.0 %    MCV 88 78 - 100 fl    MCH 29.3 26.5 - 33.0 pg    MCHC 33.1 31.5 - 36.5 g/dL    RDW 15.0 10.0 - 15.0 %    Platelet Count 223 150 - 450 10e9/L   Basic metabolic panel   Result Value Ref Range    Sodium 139 133 - 144 mmol/L    Potassium 3.4 3.4 - 5.3 mmol/L    Chloride 105 94 - 109 mmol/L    Carbon Dioxide 29 20 - 32 mmol/L    Anion Gap 5 3 - 14 mmol/L    Glucose 112 (H) 70 - 99 mg/dL      Comment:      Non Fasting    Urea Nitrogen 19 7 - 30 mg/dL    Creatinine 0.53 0.52 - 1.04 mg/dL    GFR Estimate >90 >60 mL/min/[1.73_m2]      Comment:      Non  GFR Calc  Starting 12/18/2018, serum creatinine based estimated GFR (eGFR) will be   calculated using the Chronic Kidney Disease Epidemiology Collaboration   (CKD-EPI) equation.      GFR Estimate If Black >90 >60 mL/min/[1.73_m2]      Comment:       GFR Calc  Starting 12/18/2018, serum creatinine based estimated GFR (eGFR) will be   calculated using the Chronic Kidney Disease Epidemiology Collaboration   (CKD-EPI) equation.      Calcium 9.5 8.5 -  10.1 mg/dL   Lipid panel   Result Value Ref Range    Cholesterol 204 (H) <200 mg/dL      Comment:      Desirable:       <200 mg/dl    Triglycerides 138 <150 mg/dL      Comment:      Non Fasting    HDL Cholesterol 52 >49 mg/dL    LDL Cholesterol Calculated 124 (H) <100 mg/dL      Comment:      Above desirable:  100-129 mg/dl  Borderline High:  130-159 mg/dL  High:             160-189 mg/dL  Very high:       >189 mg/dl      Non HDL Cholesterol 152 (H) <130 mg/dL      Comment:      Above Desirable:  130-159 mg/dl  Borderline high:  160-189 mg/dl  High:             190-219 mg/dl  Very high:       >219 mg/dl         If you have any questions or concerns, please call the clinic at the number listed above.       Sincerely,        Zechariah Castro MD/CB

## 2019-05-31 NOTE — PATIENT INSTRUCTIONS
Patient Education     Low-Salt Choices  Eating salt (sodium) can make your body retain too much water. Excess water makes your heart work harder. Canned, packaged, and frozen foods are easy to prepare. But they are often high in sodium. Here are some ideas for low-salt foods you can easily make yourself.    For breakfast    Fruit or 100% fruit juice    Whole-wheat bread or an English muffin. Look for sodium content on Nutrition Facts labels.    Low-fat milk or yogurt    Unsalted eggs    Shredded wheat    Corn tortillas    Unsalted steamed rice    Regular (not instant) hot cereal, made without salt  Stay away from:    Sausage, zamora, and ham    Flour tortillas    Packaged muffins, pancakes, and biscuits    Instant hot cereals    Cottage cheese  For lunch and dinner    Fresh fish, chicken, turkey, or meat baked, broiled, or roasted without salt    Dry beans, cooked without salt    Tofu, stir-fried without salt    Unsalted fresh fruit and vegetables, or frozen or canned fruit and vegetables with no added salt  Stay away from:    Lunch or deli meat that is cured or smoked    Cheese    Tomato juice and ketchup    Canned vegetables, soups, and fish not labeled as no-salt-added or reduced sodium    Packaged gravies and sauces    Olives, pickles, and relish    Bottled salad dressings  For snacks and desserts    Yogurt    Unsalted, air-popped popcorn    Unsalted nuts or seeds  Stay away from:    Pies and cakes    Packaged dessert mixes    Pizza    Canned and packaged puddings    Pretzels, chips, crackers, and nuts unless the label says unsalted  Date Last Reviewed: 6/1/2017 2000-2018 "[employer]". [address] [place] [address], [place] [address]. All rights reserved. This information is not intended as a substitute for professional medical care. Always follow your healthcare professional's instructions.

## 2019-05-31 NOTE — NURSING NOTE
Screening Questionnaire for Adult Immunization    Are you sick today?   No   Do you have allergies to medications, food, a vaccine component or latex?   No   Have you ever had a serious reaction after receiving a vaccination?   No   Do you have a long-term health problem with heart disease, lung disease, asthma, kidney disease, metabolic disease (e.g. diabetes), anemia, or other blood disorder?   No   Do you have cancer, leukemia, HIV/AIDS, or any other immune system problem?   No   In the past 3 months, have you taken medications that affect  your immune system, such as prednisone, other steroids, or anticancer drugs; drugs for the treatment of rheumatoid arthritis, Crohn s disease, or psoriasis; or have you had radiation treatments?   No   Have you had a seizure, or a brain or other nervous system problem?   No   During the past year, have you received a transfusion of blood or blood     products, or been given immune (gamma) globulin or antiviral drug?   No   For women: Are you pregnant or is there a chance you could become        pregnant during the next month?   No   Have you received any vaccinations in the past 4 weeks?   No     Immunization questionnaire answers were all negative.        Per orders of Dr. Castro, injection of PCV13 given by Angelica Morton. Patient instructed to remain in clinic for 15 minutes afterwards, and to report any adverse reaction to me immediately.       Screening performed by Angelica Morton on 5/31/2019 at 9:08 AM.

## 2019-05-31 NOTE — PROGRESS NOTES
Subjective     Marlin Harman is a 63 year old female who presents to clinic today for the following health issues:    HPI     Patient had a wood tick bite on her left arm four days ago. She removed the tick but now the area is red and swollen.    Hypertension Follow-up      Do you check your blood pressure regularly outside of the clinic? No     Are you following a low salt diet? No    Are your blood pressures ever more than 140 on the top number (systolic) OR more   than 90 on the bottom number (diastolic), for example 140/90? NA    Amount of exercise or physical activity: None    Problems taking medications regularly: No    Medication side effects: none    Diet: regular (no restrictions)      Add on: Had a wood tick bite on left forearm last Tuesday, rash is spreading, slightly painful and itchy    Patient Active Problem List   Diagnosis     Chronic seasonal allergic rhinitis due to pollen     Morbid obesity with BMI of 40.0-44.9, adult (H)     Benign essential hypertension     Tobacco use     Colonoscopy refused     Mammogram declined     Abnormal results of liver function studies     Scleral icterus     Obstruction of bile duct     S/P cholecystectomy     Colon injury     Cervical cancer screening     BRITT (obstructive sleep apnea)     Past Surgical History:   Procedure Laterality Date     CHOLECYSTECTOMY N/A 10/10/2018    Procedure: CHOLECYSTECTOMY;;  Surgeon: Alex Rendon MD;  Location: [facility]     ENDOSCOPIC RETROGRADE CHOLANGIOPANCREATOGRAM N/A 8/22/2018    Procedure: COMBINED ENDOSCOPIC RETROGRADE CHOLANGIOPANCREATOGRAPHY, PLACE TUBE/STENT;  Endoscopic Retrograde Cholangiopancreatogram with spinchterotomy, bile duct and pancreatic ducts stents placement;  Surgeon: Manny Cooper MD;  Location: [facility]     ENDOSCOPIC RETROGRADE CHOLANGIOPANCREATOGRAM N/A 9/13/2018    Procedure: ENDOSCOPIC RETROGRADE CHOLANGIOPANCREATOGRAM;  Endoscopic Retrograde Cholangiopancreatogram, with Stent Removal,  Spyglass Cholangioscopy with Electro Thornwood Liptotripsy;  Surgeon: Manny Cooper MD;  Location: [facility]     ENDOSCOPIC RETROGRADE CHOLANGIOPANCREATOGRAM N/A 10/10/2018    Procedure: COMBINED ENDOSCOPIC RETROGRADE CHOLANGIOPANCREATOGRAPHY, PLACE TUBE/STENT;;  Surgeon: Amish Mckeon MD;  Location: [facility]     ENDOSCOPIC RETROGRADE CHOLANGIOPANCREATOGRAM N/A 11/29/2018    Procedure: Endoscopic Retrograde Cholangiopancreatography with stent removal and bile duct sweep;  Surgeon: Manny Cooper MD;  Location: [facility]     LAPAROSCOPIC CHOLECYSTECTOMY N/A 10/10/2018    Procedure: LAPAROSCOPIC CHOLECYSTECTOMY;  Attempted Laparoscopic Cholecystectomy Converted to Open Cholecystectomy, Intraoperative cholagiogram x2, repair of colon injury, endoscopic retrograde cholangiogram with biliary stent insertion.;  Surgeon: Alex Rendon MD;  Location: [facility] OR       Social History     Tobacco Use     Smoking status: Light Tobacco Smoker     Types: Cigarettes     Smokeless tobacco: Never Used     Tobacco comment: 4-6 cigs/day, smoked since age 18, at most 2 packs per day   Substance Use Topics     Alcohol use: Yes     Comment: rare      Family History   Problem Relation Age of Onset     Influenza/Pneumonia Mother      Substance Abuse Mother      Thyroid Disease Mother      Rheumatoid Arthritis Mother      Substance Abuse Father      Myocardial Infarction Father      Thyroid Disease Sister      Osteoporosis Sister          Current Outpatient Medications   Medication Sig Dispense Refill     albuterol (PROAIR HFA/PROVENTIL HFA/VENTOLIN HFA) 108 (90 Base) MCG/ACT inhaler Inhale 2 puffs into the lungs every 6 hours as needed for shortness of breath / dyspnea or wheezing 1 Inhaler 1     aspirin 81 MG chewable tablet Take 81 mg by mouth daily       cyclobenzaprine (FLEXERIL) 10 MG tablet Take 1 tablet (10 mg) by mouth nightly as needed for muscle spasms 30 tablet 1     diphenhydrAMINE (BENADRYL) 25 MG tablet Take 25 mg by  mouth nightly as needed for itching or allergies       folic acid (FOLVITE) 1 MG tablet Take 1 mg by mouth daily       Glucosamine Sulfate 500 MG TABS Take 1 tablet by mouth daily       hydrochlorothiazide (HYDRODIURIL) 25 MG tablet Take 1 tablet (25 mg) by mouth daily 14 tablet 0     loratadine (CLARITIN) 10 MG tablet Take 10 mg by mouth daily       saccharomyces boulardii (FLORASTOR) 250 MG capsule Take 250 mg by mouth 2 times daily       vitamin B complex with vitamin C (VITAMIN  B COMPLEX) TABS tablet Take 1 tablet by mouth daily       vitamin E 400 UNIT capsule Take 400 Units by mouth daily       Allergies   Allergen Reactions     Ciprofloxacin      body stiffness      Citrus Dermatitis     Levaquin [Levofloxacin]      body stiffness      Mold      Seafood Nausea and Vomiting     Scallops only, all other seafood ok     Small Pox Vaccine      Sulfa Drugs      Latex Itching and Rash     Recent Labs   Lab Test 11/29/18  0744 10/22/18  1041 10/16/18  0646  08/02/18  1213 04/16/18   A1C  --   --   --   --  5.6  --    ALT 37 31 52*   < > 161*  --    CR 0.59  --  0.63   < > 0.50*  --    GFRESTIMATED >90  --  >90   < > >90  --    GFRESTBLACK >90  --  >90   < > >90  --    POTASSIUM 2.9*  --  3.7   < > 3.0*  --    TSH  --   --   --   --  0.98 3.360    < > = values in this interval not displayed.      BP Readings from Last 3 Encounters:   05/31/19 134/62   11/29/18 (!) 146/92   11/19/18 130/80    Wt Readings from Last 3 Encounters:   05/31/19 109.8 kg (242 lb)   11/29/18 104.8 kg (231 lb 0.7 oz)   11/19/18 104.8 kg (231 lb)                  Reviewed and updated as needed this visit by Provider         Review of Systems   ROS COMP: Constitutional, HEENT, cardiovascular, pulmonary, GI, , musculoskeletal, neuro, skin, endocrine and psych systems are negative, except as otherwise noted.      Objective    /62 (BP Location: Right arm, Cuff Size: Adult Large)   Pulse 80   Temp 97.8  F (36.6  C) (Tympanic)   Resp 18    "Ht 1.6 m (5' 3\")   Wt 109.8 kg (242 lb)   BMI 42.87 kg/m    Body mass index is 42.87 kg/m .  Physical Exam   GENERAL: alert, no distress and obese  EYES: Eyes grossly normal to inspection, PERRL and conjunctivae and sclerae normal  NECK: no adenopathy, no asymmetry, masses, or scars and thyroid normal to palpation  RESP: lungs clear to auscultation - no rales, rhonchi or wheezes  CV: regular rate and rhythm, normal S1 S2, no S3 or S4, no murmur, click or rub, no peripheral edema and peripheral pulses strong  ABDOMEN: soft, nontender, no hepatosplenomegaly, no masses and bowel sounds normal  MS: no gross musculoskeletal defects noted, no edema  NEURO: Normal strength and tone, mentation intact and speech normal  PSYCH: mentation appears normal, affect normal/bright  SKIN: Circular erythematous rash with a spreading margins involving left forearm as shown below            Assessment & Plan     1. Benign essential hypertension  -Blood pressure within target goal of less than 150/90.  Continue hydrochlorothiazide and aspirin  - CBC with platelets  - Basic metabolic panel  - Lipid panel  - hydrochlorothiazide (HYDRODIURIL) 25 MG tablet; Take 1 tablet (25 mg) by mouth daily  Dispense: 90 tablet; Refill: 1      2. Tobacco use  -Smoking about 3 to 4 cigarettes/day, associated health hazards explained in detail, not ready to quit currently      3. Special screening for malignant neoplasms, colon  - Fecal colorectal cancer screen FIT; Future      4. Morbid obesity with BMI of 40.0-44.9, adult (H)  Healthy lifestyle modifications stressed including regular exercise, balanced diet, weight loss and limiting salt/caffeine/pop intake.      5. Screening for diabetes mellitus  - Glucose FUTURE anytime; Future      6. Tick bite of left forearm, initial encounter  -Had a wood tick bite on left forearm last Tuesday.  Physical examination suspicious for developing cellulitis.  Doxycycline prescribed, common side effect discussed.  " "Suggested to keep the area dry and clean  - doxycycline hyclate (VIBRAMYCIN) 100 MG capsule; Take 1 capsule (100 mg) by mouth 2 times daily for 10 days  Dispense: 20 capsule; Refill: 0      7. Chronic seasonal allergic rhinitis due to pollen  - albuterol (PROAIR HFA/PROVENTIL HFA/VENTOLIN HFA) 108 (90 Base) MCG/ACT inhaler; Inhale 2 puffs into the lungs every 6 hours as needed for shortness of breath / dyspnea or wheezing  Dispense: 18 g; Refill: 3      8. S/P cholecystectomy  -Does experience muscle spasm at times, Flexeril refilled  - cyclobenzaprine (FLEXERIL) 10 MG tablet; Take 1 tablet (10 mg) by mouth nightly as needed for muscle spasms  Dispense: 30 tablet; Refill: 2      9. Need for pneumococcal vaccine  - ADMIN 1st VACCINE       Tobacco Cessation:   reports that she has been smoking cigarettes.  She has never used smokeless tobacco.  Tobacco Cessation Action Plan: Information offered: Patient not interested at this time      BMI:   Estimated body mass index is 42.87 kg/m  as calculated from the following:    Height as of this encounter: 1.6 m (5' 3\").    Weight as of this encounter: 109.8 kg (242 lb).   Weight management plan: Discussed healthy diet and exercise guidelines        I spent 40 minutes during this encounter, greater than 50% of the time was spent on education, counseling, reviewing the plan of care, and coordination in regards to her specific conditions.       Patient Instructions     Patient Education     Low-Salt Choices  Eating salt (sodium) can make your body retain too much water. Excess water makes your heart work harder. Canned, packaged, and frozen foods are easy to prepare. But they are often high in sodium. Here are some ideas for low-salt foods you can easily make yourself.    For breakfast    Fruit or 100% fruit juice    Whole-wheat bread or an English muffin. Look for sodium content on Nutrition Facts labels.    Low-fat milk or yogurt    Unsalted eggs    Shredded wheat    Corn " tortillas    Unsalted steamed rice    Regular (not instant) hot cereal, made without salt  Stay away from:    Sausage, zamora, and ham    Flour tortillas    Packaged muffins, pancakes, and biscuits    Instant hot cereals    Cottage cheese  For lunch and dinner    Fresh fish, chicken, turkey, or meat--baked, broiled, or roasted without salt    Dry beans, cooked without salt    Tofu, stir-fried without salt    Unsalted fresh fruit and vegetables, or frozen or canned fruit and vegetables with no added salt  Stay away from:    Lunch or deli meat that is cured or smoked    Cheese    Tomato juice and ketchup    Canned vegetables, soups, and fish not labeled as no-salt-added or reduced sodium    Packaged gravies and sauces    Olives, pickles, and relish    Bottled salad dressings  For snacks and desserts    Yogurt    Unsalted, air-popped popcorn    Unsalted nuts or seeds  Stay away from:    Pies and cakes    Packaged dessert mixes    Pizza    Canned and packaged puddings    Pretzels, chips, crackers, and nuts--unless the label says unsalted  Date Last Reviewed: 6/1/2017 2000-2018 [employer]. [address] [place] [address], [place] [address]. All rights reserved. This information is not intended as a substitute for professional medical care. Always follow your healthcare professional's instructions.               Return in about 6 months (around 11/30/2019).        Zechariah Castro MD  [facility]

## 2019-05-31 NOTE — NURSING NOTE
"Chief Complaint   Patient presents with     Hypertension     Tick Bite       Initial /62 (BP Location: Right arm, Cuff Size: Adult Large)   Pulse 80   Temp 97.8  F (36.6  C) (Tympanic)   Resp 18   Ht 1.6 m (5' 3\")   Wt 109.8 kg (242 lb)   BMI 42.87 kg/m   Estimated body mass index is 42.87 kg/m  as calculated from the following:    Height as of this encounter: 1.6 m (5' 3\").    Weight as of this encounter: 109.8 kg (242 lb).    Patient presents to the clinic using No DME    Health Maintenance that is potentially due pending provider review:  Mammogram and Colonoscopy/FIT    Patient states that she will not do a mammogram. She is willing to do a FIT test.    Is there anyone who you would like to be able to receive your results? No  If yes have patient fill out LEONA      "

## 2019-11-18 NOTE — H&P
[facility], [place]    Internal Medicine History and Physical - Gold Service       Date of Admission:  8/21/2018    Assessment & Plan   Marlin Harman is a 62 year old female  admitted on 8/21/2018. She has a history of HTN and is admitted for CBD stone and cystic duct stone with concerns for possible cholangitis.    # CBD stone, cystic duct stone, concern for cholangitis  Has large CBD stone, 1.3cm.  Cystic duct stone too.  Outpatient surgeon recommended GI at the  for advanced ERCP.    -  GI consult:  Appreciate recommendations  -  Zosyn  -  1L NS  -  Trend LFTs, INR    #  HTN  At home takes hydrochlorothiazide.  Given need for mild IVF and has acceptable BPs, holding hydrochlorothiazide for now.  -  Labetolol PRN  -  Holding hydrochlorothiazide until post-procedure    # Pain Assessment:   Marlin odell pain level was assessed and she currently denies pain.      Diet: NPO per Anesthesia Guidelines for Procedure/Surgery Except for: Meds, Ice Chips  Regular Diet Adult  Fluids: NS 1L over 4 hrs  DVT Prophylaxis: Pneumatic Compression Devices  Code Status: Full Code    Disposition Plan   Expected discharge: 1-2 days; recommended to prior living arrangement once adequate pain management/ tolerating PO medications, antibiotic plan established and ERCP completed.     Entered: Radha Umaña 08/21/2018, 4:53 PM   Information in the above section will display in the discharge planner report.    The patient's care was discussed with the Bedside Nurse and Patient.    Radha Umaña  Internal Medicine Staff Hospitalist Service  [facility]  Pager: [phone]  Please see sticky note for cross cover information  ______________________________________________________________________    Chief Complaint   Jaundice    History is obtained from the patient    History of Present Illness   Marlin Harman is a 62 year old female  who has a history of HTN and is admitted for CBD  "and cystic duct stone with concerns for cholangitis.    Ms. Harman reports 1 month of jaundice and pruritus.  About 1 week ago, she had an MRI that found a CBD and cystic duct stone.  She saw a surgeon for consideration of lap mirian, and the surgeon recommended she urgently have an ERCP with GI here at the  given the size of the CBD stone.  For the past 2-3 days, she has had low grade nocturnal fevers with shaking chills.  No nausea/vomiting, abdominal pain.  For the past month, she has had diarrhea which can be \"like an air godinez\" and has caused discomfort.  She has had less diarrhea in the last few days.  Otherwise is asymptomatic.    Review of Systems   The 10 point Review of Systems is negative other than noted in the HPI or here.     Past Medical History    I have reviewed this patient's medical history and updated it with pertinent information if needed.     HTN  Chronic back pain    Past Surgical History   I have reviewed this patient's surgical history and updated it with pertinent information if needed.  Broken wrist as a child, required general anesthesia for repair    Social History   Social History   Substance Use Topics     Smoking status: Light Tobacco Smoker     Types: Cigarettes     Smokeless tobacco: Never Used      Comment: 4-6 cigs/day, smoked since age 18, at most 2 packs per day     Alcohol use Yes      Comment: rare    Currently smoking ~4-6 cigarettes/day    Family History   I have reviewed this patient's family history and updated it with pertinent information if needed.   Family History   Problem Relation Age of Onset     Influenza/Pneumonia Mother      Substance Abuse Mother      Thyroid Disease Mother      Rheumatoid Arthritis Mother      Substance Abuse Father      Myocardial Infarction Father      Thyroid Disease Sister      Osteoperosis Sister    Father  of kidney failure    Prior to Admission Medications   Prior to Admission Medications   Prescriptions Last Dose Informant Patient " Reported? Taking?   Cranberry 250 MG CAPS 8/19/2018 at Unknown time  Yes Yes   Glucosamine Sulfate 500 MG TABS 8/19/2018  Yes Yes   Sig: Take 1 tablet by mouth daily   aspirin 81 MG chewable tablet 8/19/2018 at Unknown time  Yes Yes   Sig: Take 81 mg by mouth daily   diphenhydrAMINE (BENADRYL) 25 MG tablet 8/20/2018  Yes Yes   Sig: Take 25 mg by mouth nightly as needed for itching or allergies   folic acid (FOLVITE) 1 MG tablet 8/19/2018  Yes Yes   Sig: Take 1 mg by mouth daily   hydrochlorothiazide (HYDRODIURIL) 25 MG tablet 8/19/2018  Yes Yes   Sig: Take 25 mg by mouth daily   loratadine (CLARITIN) 10 MG tablet 8/20/2018 at Unknown time  Yes Yes   Sig: Take 10 mg by mouth daily   vitamin B complex with vitamin C (VITAMIN  B COMPLEX) TABS tablet 8/19/2018  Yes Yes   Sig: Take 1 tablet by mouth daily   vitamin E 400 UNIT capsule 8/19/2018  Yes Yes   Sig: Take 400 Units by mouth daily      Facility-Administered Medications: None     Allergies   Allergies   Allergen Reactions     Citrus Dermatitis     Latex      Mold      Seafood Nausea and Vomiting     Scallops only, all other seafood ok     Small Pox Vaccine      Sulfa Drugs        Physical Exam   Vital Signs: Temp: 96.5  F (35.8  C) Temp src: Oral BP: 159/75   Heart Rate: 85 Resp: 18 SpO2: 94 % O2 Device: None (Room air)    Weight: 234 lbs 8 oz    General Appearance: Sitting in bed, frustrated  Eyes: EOMI, scleral icterus  HEENT: MMM  Respiratory: CTAB, breathing comfortably on room air  Cardiovascular: RRR, no m/r/g, peripheral pulses intact  GI: NABS, soft, NT, ND  Skin: No eccyhmoses noted, some excoriations  Musculoskeletal: Trace pedal edema  Neurologic: Alert, appropriately interactive  Psychiatric: Frustrated but appropriate    Data   Data reviewed today: I reviewed all medications, new labs and imaging results over the last 24 hours. I personally reviewed the chest x-ray image(s) showing no acute airspace disease.    Data     Recent Labs  Lab 08/21/18  8977  08/20/18  1206   WBC 9.7 12.4*   HGB 12.4 12.7   MCV 96 95    262    135   POTASSIUM 3.0* 3.0*   CHLORIDE 101 97   CO2 29 28   BUN 18 16   CR 0.57 0.53   ANIONGAP 8 10   KEILY 9.0 9.2   GLC 87 88   ALBUMIN 2.5* 2.5*   PROTTOTAL 7.4 7.5   BILITOTAL 11.3* 13.0*   ALKPHOS 316* 347*   * 125*   * 112*   LIPASE 171 138     Recent Results (from the past 24 hour(s))   POC US ECHO LIMITED    Impression    Error. Order placed on wrong patient.   XR Chest 2 Views    Narrative    Exam: XR CHEST 2 VW, 8/21/2018 2:04 PM    Indication: cough, fevers;     Comparison: None    Findings:   PA and lateral views of the chest. The cardiomediastinal silhouette is  normal in size. The pulmonary vasculature is within normal limits. No  pneumothorax or pleural effusion. No focal airspace opacities. No  acute bony abnormalities. The visualized upper abdomen appears  unremarkable.      Impression    Impression: No acute airspace disease.    I have personally reviewed the examination and initial interpretation  and I agree with the findings.    BG CHACON MD        No

## 2019-12-10 ENCOUNTER — OFFICE VISIT (OUTPATIENT)
Dept: FAMILY MEDICINE | Facility: CLINIC | Age: 64
End: 2019-12-10
Payer: MEDICARE

## 2019-12-10 VITALS
TEMPERATURE: 98.2 F | HEART RATE: 64 BPM | SYSTOLIC BLOOD PRESSURE: 130 MMHG | RESPIRATION RATE: 18 BRPM | HEIGHT: 63 IN | BODY MASS INDEX: 43.05 KG/M2 | WEIGHT: 243 LBS | DIASTOLIC BLOOD PRESSURE: 84 MMHG

## 2019-12-10 DIAGNOSIS — L30.9 ECZEMA, UNSPECIFIED TYPE: ICD-10-CM

## 2019-12-10 DIAGNOSIS — Z12.11 COLON CANCER SCREENING: ICD-10-CM

## 2019-12-10 DIAGNOSIS — I10 BENIGN ESSENTIAL HYPERTENSION: Primary | ICD-10-CM

## 2019-12-10 DIAGNOSIS — J30.1 CHRONIC SEASONAL ALLERGIC RHINITIS DUE TO POLLEN: ICD-10-CM

## 2019-12-10 DIAGNOSIS — M62.838 MUSCLE SPASM: ICD-10-CM

## 2019-12-10 PROCEDURE — 99214 OFFICE O/P EST MOD 30 MIN: CPT | Performed by: FAMILY MEDICINE

## 2019-12-10 RX ORDER — CYCLOBENZAPRINE HCL 10 MG
10 TABLET ORAL
Qty: 30 TABLET | Refills: 2 | Status: SHIPPED | OUTPATIENT
Start: 2019-12-10 | End: 2020-05-01

## 2019-12-10 RX ORDER — FLUOCINONIDE 0.5 MG/G
CREAM TOPICAL DAILY PRN
Qty: 60 G | Refills: 1 | Status: SHIPPED | OUTPATIENT
Start: 2019-12-10 | End: 2021-02-02

## 2019-12-10 RX ORDER — HYDROCHLOROTHIAZIDE 25 MG/1
25 TABLET ORAL DAILY
Qty: 90 TABLET | Refills: 1 | Status: SHIPPED | OUTPATIENT
Start: 2019-12-10 | End: 2020-05-01

## 2019-12-10 RX ORDER — ALBUTEROL SULFATE 90 UG/1
2 AEROSOL, METERED RESPIRATORY (INHALATION) EVERY 6 HOURS PRN
Qty: 18 G | Refills: 3 | Status: SHIPPED | OUTPATIENT
Start: 2019-12-10 | End: 2020-05-01

## 2019-12-10 ASSESSMENT — MIFFLIN-ST. JEOR: SCORE: 1611.37

## 2019-12-10 NOTE — PROGRESS NOTES
SUBJECTIVE   Marlin Harman is a 64 year old female who presents with     Hypertension Follow-up      Do you check your blood pressure regularly outside of the clinic? No     Are you following a low salt diet? Yes    Are your blood pressures ever more than 140 on the top number (systolic) OR more   than 90 on the bottom number (diastolic), for example 140/90? No    Asthma Follow-Up    Was ACT completed today?  Yes    ACT Total Scores 12/10/2019   ACT TOTAL SCORE (Goal Greater than or Equal to 20) 20   In the past 12 months, how many times did you visit the emergency room for your asthma without being admitted to the hospital? 0   In the past 12 months, how many times were you hospitalized overnight because of your asthma? 0       How many days per week do you miss taking your asthma controller medication?  I do not have an asthma controller medication    Please describe any recent triggers for your asthma: pollens, mold and cold air    Have you had any Emergency Room Visits, Urgent Care Visits, or Hospital Admissions since your last office visit?  No      PCP   Zechariah Castro [phone]    Health Maintenance        Health Maintenance Due   Topic Date Due     DEXA  1955     MAMMO SCREENING  1955     FIT  12/01/1963     DTAP/TDAP/TD IMMUNIZATION (1 - Tdap) 12/01/1964     ZOSTER IMMUNIZATION (1 of 2) 12/01/2003     MEDICARE ANNUAL WELLNESS VISIT  12/01/2018     FALL RISK ASSESSMENT  12/07/2019       HPI        Patient Active Problem List   Diagnosis     Chronic seasonal allergic rhinitis due to pollen     Morbid obesity with BMI of 40.0-44.9, adult (H)     Benign essential hypertension     Tobacco use     Colonoscopy refused     Mammogram declined     Abnormal results of liver function studies     Scleral icterus     Obstruction of bile duct     S/P cholecystectomy     Colon injury     Cervical cancer screening     BRITT (obstructive sleep apnea)     Current Outpatient Medications   Medication      albuterol (PROAIR HFA/PROVENTIL HFA/VENTOLIN HFA) 108 (90 Base) MCG/ACT inhaler     aspirin 81 MG chewable tablet     cyclobenzaprine (FLEXERIL) 10 MG tablet     diphenhydrAMINE (BENADRYL) 25 MG tablet     folic acid (FOLVITE) 1 MG tablet     Glucosamine Sulfate 500 MG TABS     hydrochlorothiazide (HYDRODIURIL) 25 MG tablet     loratadine (CLARITIN) 10 MG tablet     vitamin B complex with vitamin C (VITAMIN  B COMPLEX) TABS tablet     vitamin E 400 UNIT capsule     No current facility-administered medications for this visit.        Patient Active Problem List   Diagnosis     Chronic seasonal allergic rhinitis due to pollen     Morbid obesity with BMI of 40.0-44.9, adult (H)     Benign essential hypertension     Tobacco use     Colonoscopy refused     Mammogram declined     Abnormal results of liver function studies     Scleral icterus     Obstruction of bile duct     S/P cholecystectomy     Colon injury     Cervical cancer screening     BRITT (obstructive sleep apnea)     Past Surgical History:   Procedure Laterality Date     CHOLECYSTECTOMY N/A 10/10/2018    Procedure: CHOLECYSTECTOMY;;  Surgeon: Alex Rendon MD;  Location: [facility]     ENDOSCOPIC RETROGRADE CHOLANGIOPANCREATOGRAM N/A 8/22/2018    Procedure: COMBINED ENDOSCOPIC RETROGRADE CHOLANGIOPANCREATOGRAPHY, PLACE TUBE/STENT;  Endoscopic Retrograde Cholangiopancreatogram with spinchterotomy, bile duct and pancreatic ducts stents placement;  Surgeon: Manny Cooper MD;  Location: [facility]     ENDOSCOPIC RETROGRADE CHOLANGIOPANCREATOGRAM N/A 9/13/2018    Procedure: ENDOSCOPIC RETROGRADE CHOLANGIOPANCREATOGRAM;  Endoscopic Retrograde Cholangiopancreatogram, with Stent Removal, Spyglass Cholangioscopy with Electro [place] Liptotripsy;  Surgeon: Manny Cooper MD;  Location: [facility]     ENDOSCOPIC RETROGRADE CHOLANGIOPANCREATOGRAM N/A 10/10/2018    Procedure: COMBINED ENDOSCOPIC RETROGRADE CHOLANGIOPANCREATOGRAPHY, PLACE TUBE/STENT;;  Surgeon: Amish Mckeon  MD Guy;  Location: [facility]     ENDOSCOPIC RETROGRADE CHOLANGIOPANCREATOGRAM N/A 11/29/2018    Procedure: Endoscopic Retrograde Cholangiopancreatography with stent removal and bile duct sweep;  Surgeon: Manny Cooper MD;  Location: [facility]     LAPAROSCOPIC CHOLECYSTECTOMY N/A 10/10/2018    Procedure: LAPAROSCOPIC CHOLECYSTECTOMY;  Attempted Laparoscopic Cholecystectomy Converted to Open Cholecystectomy, Intraoperative cholagiogram x2, repair of colon injury, endoscopic retrograde cholangiogram with biliary stent insertion.;  Surgeon: Alex Rendon MD;  Location: [facility]       Social History     Tobacco Use     Smoking status: Light Tobacco Smoker     Types: Cigarettes     Smokeless tobacco: Never Used     Tobacco comment: 4-6 cigs/day, smoked since age 18, at most 2 packs per day   Substance Use Topics     Alcohol use: Yes     Comment: rare      Family History   Problem Relation Age of Onset     Influenza/Pneumonia Mother      Substance Abuse Mother      Thyroid Disease Mother      Rheumatoid Arthritis Mother      Substance Abuse Father      Myocardial Infarction Father      Thyroid Disease Sister      Osteoporosis Sister          Current Outpatient Medications   Medication Sig Dispense Refill     albuterol (PROAIR HFA/PROVENTIL HFA/VENTOLIN HFA) 108 (90 Base) MCG/ACT inhaler Inhale 2 puffs into the lungs every 6 hours as needed for shortness of breath / dyspnea or wheezing 18 g 3     aspirin 81 MG chewable tablet Take 81 mg by mouth daily       cyclobenzaprine (FLEXERIL) 10 MG tablet Take 1 tablet (10 mg) by mouth nightly as needed for muscle spasms 30 tablet 2     diphenhydrAMINE (BENADRYL) 25 MG tablet Take 25 mg by mouth nightly as needed for itching or allergies       folic acid (FOLVITE) 1 MG tablet Take 1 mg by mouth daily       Glucosamine Sulfate 500 MG TABS Take 1 tablet by mouth daily       hydrochlorothiazide (HYDRODIURIL) 25 MG tablet Take 1 tablet (25 mg) by mouth daily 90 tablet 1      "loratadine (CLARITIN) 10 MG tablet Take 10 mg by mouth daily       vitamin B complex with vitamin C (VITAMIN  B COMPLEX) TABS tablet Take 1 tablet by mouth daily       vitamin E 400 UNIT capsule Take 400 Units by mouth daily       Allergies   Allergen Reactions     Ciprofloxacin      body stiffness      Citrus Dermatitis     Levaquin [Levofloxacin]      body stiffness      Mold      Seafood Nausea and Vomiting     Scallops only, all other seafood ok     Small Pox Vaccine      Sulfa Drugs      Latex Itching and Rash     Recent Labs   Lab Test 05/31/19  0910 11/29/18  0744 10/22/18  1041 10/16/18  0646  08/02/18  1213 04/16/18   A1C  --   --   --   --   --  5.6  --    *  --   --   --   --   --   --    HDL 52  --   --   --   --   --   --    TRIG 138  --   --   --   --   --   --    ALT  --  37 31 52*   < > 161*  --    CR 0.53 0.59  --  0.63   < > 0.50*  --    GFRESTIMATED >90 >90  --  >90   < > >90  --    GFRESTBLACK >90 >90  --  >90   < > >90  --    POTASSIUM 3.4 2.9*  --  3.7   < > 3.0*  --    TSH  --   --   --   --   --  0.98 3.360    < > = values in this interval not displayed.      BP Readings from Last 3 Encounters:   12/10/19 130/84   05/31/19 134/62   11/29/18 (!) 146/92    Wt Readings from Last 3 Encounters:   12/10/19 110.2 kg (243 lb)   05/31/19 109.8 kg (242 lb)   11/29/18 104.8 kg (231 lb 0.7 oz)                    Reviewed and updated:  Tobacco  Allergies  Meds  Med Hx  Surg Hx  Fam Hx  Soc Hx     ROS:  Constitutional, neuro, ENT, endocrine, pulmonary, cardiac, gastrointestinal, genitourinary, musculoskeletal, integument and psychiatric systems are negative, except as otherwise noted.    PHYSICAL EXAM   /84 (Cuff Size: Adult Regular)   Pulse 64   Temp 98.2  F (36.8  C) (Tympanic)   Resp 18   Ht 1.6 m (5' 3\")   Wt 110.2 kg (243 lb)   Breastfeeding No   BMI 43.05 kg/m    Body mass index is 43.05 kg/m .  GENERAL: alert and no distress  EYES: Eyes grossly normal to inspection, PERRL " and conjunctivae and sclerae normal  HENT: normal cephalic/atraumatic, nose and mouth without ulcers or lesions, oropharynx clear and oral mucous membranes moist  NECK: no adenopathy, no asymmetry, masses, or scars and thyroid normal to palpation  RESP: lungs clear to auscultation - no rales, rhonchi or wheezes  CV: regular rates and rhythm, normal S1 S2, no S3 or S4 and no murmur, click or rub  ABDOMEN: soft, nontender  MS: no gross musculoskeletal defects noted, no edema  SKIN: eczematous skin involving bilateral extensor elbow and lower legs  NEURO: Normal strength and tone, mentation intact and speech normal  PSYCH: mentation appears normal, affect normal/bright      Wt Readings from Last 10 Encounters:   12/10/19 110.2 kg (243 lb)   05/31/19 109.8 kg (242 lb)   11/29/18 104.8 kg (231 lb 0.7 oz)   11/19/18 104.8 kg (231 lb)   11/01/18 106.1 kg (234 lb)   10/22/18 106.1 kg (234 lb)   10/10/18 50.3 kg (110 lb 14.4 oz)   10/03/18 108.4 kg (239 lb)   09/26/18 108.6 kg (239 lb 8 oz)   09/13/18 108 kg (238 lb 1.6 oz)     Assessment & Plan     (I10) Benign essential hypertension  (primary encounter diagnosis)  Comment: Blood pressure within target goal of less than 140/90.  Suggested to continue hydrochlorothiazide. Healthy lifestyle modifications stressed including regular exercise, balanced diet, weight loss and limiting salt/caffeine/pop intake  Plan: hydrochlorothiazide (HYDRODIURIL) 25 MG tablet            (J30.1) Chronic seasonal allergic rhinitis due to pollen  Comment: History of tobacco abuse, continue albuterol inhaler as needed  Plan: albuterol (PROAIR HFA/PROVENTIL HFA/VENTOLIN         HFA) 108 (90 Base) MCG/ACT inhaler        (Z12.11) Colon cancer screening  Comment:   Plan: Fecal colorectal cancer screen (FIT)            (M62.838) Muscle spasm  Comment: Uses Flexeril for back spasms as needed, medication refilled  Plan: cyclobenzaprine (FLEXERIL) 10 MG tablet         (L30.9) Eczema, unspecified  "type  Comment: Lidex prescribed suggested to use moisturizers regularly and continue well hydration, balanced diet  Plan: fluocinonide (LIDEX) 0.05 % external cream      Patient deferred mammogram, risks and benefits explained       Tobacco Cessation:   reports that she has been smoking cigarettes. She has never used smokeless tobacco.  Tobacco Cessation Action Plan: Information offered: Patient not interested at this time      BMI:   Estimated body mass index is 43.05 kg/m  as calculated from the following:    Height as of this encounter: 1.6 m (5' 3\").    Weight as of this encounter: 110.2 kg (243 lb).   Weight management plan: Discussed healthy diet and exercise guidelines        Zechariah Castro MD  [facility]        "

## 2019-12-11 ASSESSMENT — ASTHMA QUESTIONNAIRES: ACT_TOTALSCORE: 20

## 2020-05-01 ENCOUNTER — VIRTUAL VISIT (OUTPATIENT)
Dept: FAMILY MEDICINE | Facility: CLINIC | Age: 65
End: 2020-05-01
Payer: COMMERCIAL

## 2020-05-01 DIAGNOSIS — F32.2 CURRENT SEVERE EPISODE OF MAJOR DEPRESSIVE DISORDER WITHOUT PSYCHOTIC FEATURES, UNSPECIFIED WHETHER RECURRENT (H): Primary | ICD-10-CM

## 2020-05-01 DIAGNOSIS — M62.838 MUSCLE SPASM: ICD-10-CM

## 2020-05-01 DIAGNOSIS — I10 BENIGN ESSENTIAL HYPERTENSION: ICD-10-CM

## 2020-05-01 DIAGNOSIS — J30.1 CHRONIC SEASONAL ALLERGIC RHINITIS DUE TO POLLEN: ICD-10-CM

## 2020-05-01 PROCEDURE — 99214 OFFICE O/P EST MOD 30 MIN: CPT | Mod: 95 | Performed by: FAMILY MEDICINE

## 2020-05-01 RX ORDER — ALBUTEROL SULFATE 90 UG/1
2 AEROSOL, METERED RESPIRATORY (INHALATION) EVERY 6 HOURS PRN
Qty: 18 G | Refills: 3 | Status: SHIPPED | OUTPATIENT
Start: 2020-05-01 | End: 2021-02-02

## 2020-05-01 RX ORDER — HYDROCHLOROTHIAZIDE 25 MG/1
25 TABLET ORAL DAILY
Qty: 90 TABLET | Refills: 1 | Status: SHIPPED | OUTPATIENT
Start: 2020-05-01 | End: 2020-12-29

## 2020-05-01 RX ORDER — CYCLOBENZAPRINE HCL 10 MG
10 TABLET ORAL
Qty: 30 TABLET | Refills: 2 | Status: SHIPPED | OUTPATIENT
Start: 2020-05-01 | End: 2022-05-03

## 2020-05-01 RX ORDER — ESCITALOPRAM OXALATE 10 MG/1
10 TABLET ORAL DAILY
Qty: 60 TABLET | Refills: 0 | Status: SHIPPED | OUTPATIENT
Start: 2020-05-01 | End: 2020-06-25

## 2020-05-01 SDOH — HEALTH STABILITY: MENTAL HEALTH: HOW MANY STANDARD DRINKS CONTAINING ALCOHOL DO YOU HAVE ON A TYPICAL DAY?: 1 OR 2

## 2020-05-01 SDOH — HEALTH STABILITY: MENTAL HEALTH: HOW OFTEN DO YOU HAVE SIX OR MORE DRINKS ON ONE OCCASION?: NEVER

## 2020-05-01 SDOH — HEALTH STABILITY: MENTAL HEALTH: HOW OFTEN DO YOU HAVE A DRINK CONTAINING ALCOHOL?: MONTHLY OR LESS

## 2020-05-01 SDOH — HEALTH STABILITY: MENTAL HEALTH: HOW MANY DRINKS CONTAINING ALCOHOL DO YOU HAVE ON A TYPICAL DAY WHEN YOU ARE DRINKING?: 1 OR 2

## 2020-05-01 SDOH — HEALTH STABILITY: MENTAL HEALTH: HOW OFTEN DO YOU HAVE 6 OR MORE DRINKS ON ONE OCCASION?: NEVER

## 2020-05-01 ASSESSMENT — ANXIETY QUESTIONNAIRES
6. BECOMING EASILY ANNOYED OR IRRITABLE: NEARLY EVERY DAY
7. FEELING AFRAID AS IF SOMETHING AWFUL MIGHT HAPPEN: SEVERAL DAYS
5. BEING SO RESTLESS THAT IT IS HARD TO SIT STILL: NOT AT ALL
3. WORRYING TOO MUCH ABOUT DIFFERENT THINGS: NEARLY EVERY DAY
GAD7 TOTAL SCORE: 16
1. FEELING NERVOUS, ANXIOUS, OR ON EDGE: NEARLY EVERY DAY
2. NOT BEING ABLE TO STOP OR CONTROL WORRYING: NEARLY EVERY DAY
IF YOU CHECKED OFF ANY PROBLEMS ON THIS QUESTIONNAIRE, HOW DIFFICULT HAVE THESE PROBLEMS MADE IT FOR YOU TO DO YOUR WORK, TAKE CARE OF THINGS AT HOME, OR GET ALONG WITH OTHER PEOPLE: VERY DIFFICULT

## 2020-05-01 ASSESSMENT — PATIENT HEALTH QUESTIONNAIRE - PHQ9
SUM OF ALL RESPONSES TO PHQ QUESTIONS 1-9: 23
5. POOR APPETITE OR OVEREATING: NEARLY EVERY DAY

## 2020-05-01 NOTE — PROGRESS NOTES
"Marlin Harman is a 64 year old female who is being evaluated via a billable video visit.      The patient has been notified of following:     \"This video visit will be conducted via a call between you and your physician/provider. We have found that certain health care needs can be provided without the need for an in-person physical exam.  This service lets us provide the care you need with a video conversation.  If a prescription is necessary we can send it directly to your pharmacy.  If lab work is needed we can place an order for that and you can then stop by our lab to have the test done at a later time.    Video visits are billed at different rates depending on your insurance coverage.  Please reach out to your insurance provider with any questions.    If during the course of the call the physician/provider feels a video visit is not appropriate, you will not be charged for this service.\"    Patient has given verbal consent for Video visit? Yes    How would you like to obtain your AVS? Lynne    Patient would like the video invitation sent by: Text to cell phone: [phone]    Will anyone else be joining your video visit? No      Subjective   Patient lives in [facility] but is staying with a friend here because friend has cancer and going to cancer treatment    Marlin Harman is a 64 year old female who presents to clinic today for the following health issues:    HPI  Hypertension Follow-up      Do you check your blood pressure regularly outside of the clinic? No     Are you following a low salt diet? Yes no added salt    Are your blood pressures ever more than 140 on the top number (systolic) OR more   than 90 on the bottom number (diastolic), for example 140/90? No      How many servings of fruits and vegetables do you eat daily?  2-3    On average, how many sweetened beverages do you drink each day (Examples: soda, juice, sweet tea, etc.  Do NOT count diet or artificially sweetened beverages)?   " 0    How many days per week do you exercise enough to make your heart beat faster? 3 or less    How many minutes a day do you exercise enough to make your heart beat faster? 9 or less    How many days per week do you miss taking your medication? 0    Medication Followup of Albuterol for allergies and flexeril for muscle spasms    Taking Medication as prescribed: yes    Side Effects:  None    Medication Helping Symptoms:  yes        PHQ 5/1/2020   PHQ-9 Total Score 23   Q9: Thoughts of better off dead/self-harm past 2 weeks Several days     TASHI-7 SCORE 5/1/2020   Total Score 16         Video Start Time: 8:05 AM      Patient Active Problem List   Diagnosis     Chronic seasonal allergic rhinitis due to pollen     Morbid obesity with BMI of 40.0-44.9, adult (H)     Benign essential hypertension     Tobacco use     Colonoscopy refused     Mammogram declined     Abnormal results of liver function studies     Scleral icterus     Obstruction of bile duct     S/P cholecystectomy     Colon injury     Cervical cancer screening     BRITT (obstructive sleep apnea)     Past Surgical History:   Procedure Laterality Date     CHOLECYSTECTOMY N/A 10/10/2018    Procedure: CHOLECYSTECTOMY;;  Surgeon: Alex Rendon MD;  Location: [facility]     ENDOSCOPIC RETROGRADE CHOLANGIOPANCREATOGRAM N/A 8/22/2018    Procedure: COMBINED ENDOSCOPIC RETROGRADE CHOLANGIOPANCREATOGRAPHY, PLACE TUBE/STENT;  Endoscopic Retrograde Cholangiopancreatogram with spinchterotomy, bile duct and pancreatic ducts stents placement;  Surgeon: Manny Cooper MD;  Location: [facility]     ENDOSCOPIC RETROGRADE CHOLANGIOPANCREATOGRAM N/A 9/13/2018    Procedure: ENDOSCOPIC RETROGRADE CHOLANGIOPANCREATOGRAM;  Endoscopic Retrograde Cholangiopancreatogram, with Stent Removal, Spyglass Cholangioscopy with Electro [place] Liptotripsy;  Surgeon: Manny Cooper MD;  Location: [facility]     ENDOSCOPIC RETROGRADE CHOLANGIOPANCREATOGRAM N/A 10/10/2018    Procedure: COMBINED  ENDOSCOPIC RETROGRADE CHOLANGIOPANCREATOGRAPHY, PLACE TUBE/STENT;;  Surgeon: Amish Mckeon MD;  Location: [facility]     ENDOSCOPIC RETROGRADE CHOLANGIOPANCREATOGRAM N/A 11/29/2018    Procedure: Endoscopic Retrograde Cholangiopancreatography with stent removal and bile duct sweep;  Surgeon: Manny Cooper MD;  Location: [facility]     LAPAROSCOPIC CHOLECYSTECTOMY N/A 10/10/2018    Procedure: LAPAROSCOPIC CHOLECYSTECTOMY;  Attempted Laparoscopic Cholecystectomy Converted to Open Cholecystectomy, Intraoperative cholagiogram x2, repair of colon injury, endoscopic retrograde cholangiogram with biliary stent insertion.;  Surgeon: Alex Rendon MD;  Location: [facility]       Social History     Tobacco Use     Smoking status: Light Tobacco Smoker     Packs/day: 0.30     Types: Cigarettes     Smokeless tobacco: Never Used     Tobacco comment: 4-6 cigs/day, smoked since age 18, at most 2 packs per day   Substance Use Topics     Alcohol use: Yes     Frequency: Monthly or less     Drinks per session: 1 or 2     Binge frequency: Never     Comment: rare, a couple per year     Family History   Problem Relation Age of Onset     Influenza/Pneumonia Mother      Substance Abuse Mother      Thyroid Disease Mother      Rheumatoid Arthritis Mother      Substance Abuse Father      Myocardial Infarction Father      Thyroid Disease Sister      Osteoporosis Sister          Current Outpatient Medications   Medication Sig Dispense Refill     albuterol (PROAIR HFA/PROVENTIL HFA/VENTOLIN HFA) 108 (90 Base) MCG/ACT inhaler Inhale 2 puffs into the lungs every 6 hours as needed for shortness of breath / dyspnea or wheezing 18 g 3     aspirin 81 MG chewable tablet Take 81 mg by mouth daily       cyclobenzaprine (FLEXERIL) 10 MG tablet Take 1 tablet (10 mg) by mouth nightly as needed for muscle spasms 30 tablet 2     fluocinonide (LIDEX) 0.05 % external cream Apply topically daily as needed 60 g 1     folic acid (FOLVITE) 1 MG tablet Take  1 mg by mouth daily       Glucosamine Sulfate 500 MG TABS Take 1 tablet by mouth daily       hydrochlorothiazide (HYDRODIURIL) 25 MG tablet Take 1 tablet (25 mg) by mouth daily 90 tablet 1     loratadine (CLARITIN) 10 MG tablet Take 10 mg by mouth daily       vitamin B complex with vitamin C (VITAMIN  B COMPLEX) TABS tablet Take 1 tablet by mouth daily       vitamin E 400 UNIT capsule Take 400 Units by mouth daily       diphenhydrAMINE (BENADRYL) 25 MG tablet Take 25 mg by mouth nightly as needed for itching or allergies       Allergies   Allergen Reactions     Ciprofloxacin      body stiffness      Citrus Dermatitis     Levaquin [Levofloxacin]      body stiffness      Mold      Seafood Nausea and Vomiting     Scallops only, all other seafood ok     Small Pox Vaccine      Sulfa Drugs      Latex Itching and Rash     Recent Labs   Lab Test 05/31/19  0910 11/29/18  0744 10/22/18  1041 10/16/18  0646  08/02/18  1213 04/16/18   A1C  --   --   --   --   --  5.6  --    *  --   --   --   --   --   --    HDL 52  --   --   --   --   --   --    TRIG 138  --   --   --   --   --   --    ALT  --  37 31 52*   < > 161*  --    CR 0.53 0.59  --  0.63   < > 0.50*  --    GFRESTIMATED >90 >90  --  >90   < > >90  --    GFRESTBLACK >90 >90  --  >90   < > >90  --    POTASSIUM 3.4 2.9*  --  3.7   < > 3.0*  --    TSH  --   --   --   --   --  0.98 3.360    < > = values in this interval not displayed.      BP Readings from Last 3 Encounters:   12/10/19 130/84   05/31/19 134/62   11/29/18 (!) 146/92    Wt Readings from Last 3 Encounters:   12/10/19 110.2 kg (243 lb)   05/31/19 109.8 kg (242 lb)   11/29/18 104.8 kg (231 lb 0.7 oz)                    Reviewed and updated as needed this visit by Provider         Review of Systems   ROS COMP: Constitutional, HEENT, cardiovascular, pulmonary, GI, , musculoskeletal, neuro, skin, endocrine and psych systems are negative, except as otherwise noted.      Objective    There were no vitals taken  "for this visit.  Estimated body mass index is 43.05 kg/m  as calculated from the following:    Height as of 12/10/19: 1.6 m (5' 3\").    Weight as of 12/10/19: 110.2 kg (243 lb).  Physical Exam     GENERAL: alert and no distress  EYES: Eyes grossly normal to inspection, conjunctivae and sclerae normal  RESP: no audible wheeze, cough, or visible cyanosis.  No visible retractions or increased work of breathing.  Able to speak fully in complete sentences.  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  PHQ and TASHI score as mentioned above, was having suicidal thoughts back in late March, denies any symptoms as such currently.  Lexapro prescribed, common side effects discussed and suggested counseling.  Recommended to engage in healthy activities as well.  Follow-up in 4 to 6 weeks or earlier if needed  Plan: escitalopram (LEXAPRO) 10 MG tablet, MENTAL         HEALTH REFERRAL  - Adult; Outpatient Treatment;        Individual/Couples/Family/Group Therapy/Health         Psychology; [facility] [place]: [facility]         [phone]; We will contact you to schedule        the appointment or please call with any         questions       (J30.1) Chronic seasonal allergic rhinitis due to pollen  Comment: Symptoms are stable.  Continue albuterol inhaler.  Stressed on smoking cessation  Plan: " albuterol (PROAIR HFA/PROVENTIL HFA/VENTOLIN         HFA) 108 (90 Base) MCG/ACT inhaler           (I10) Benign essential hypertension  Comment: Blood pressure stable.  Continue hydrochlorothiazide  Plan: hydrochlorothiazide (HYDRODIURIL) 25 MG tablet           (M62.838) Muscle spasm  Comment: Flexeril refilled  Plan: cyclobenzaprine (FLEXERIL) 10 MG tablet        Patient denied screening mammogram and DEXA scan, will be sending FIT kit for colon cancer screening        There are no Patient Instructions on file for this visit.    No follow-ups on file.    Zechariah Castro MD  [facility]      Video-Visit Details    Type of service:  Video Visit    Video End Time: 8:30 AM    Originating Location (pt. Location): Home    Distant Location (provider location):  [facility]     Platform used for Video Visit: Melissa Castro MD

## 2020-05-01 NOTE — PROGRESS NOTES
"Marlin Harman is a 64 year old female who is being evaluated via a billable video visit.      The patient has been notified of following:     \"This video visit will be conducted via a call between you and your physician/provider. We have found that certain health care needs can be provided without the need for an in-person physical exam.  This service lets us provide the care you need with a video conversation.  If a prescription is necessary we can send it directly to your pharmacy.  If lab work is needed we can place an order for that and you can then stop by our lab to have the test done at a later time.    Video visits are billed at different rates depending on your insurance coverage.  Please reach out to your insurance provider with any questions.    If during the course of the call the physician/provider feels a video visit is not appropriate, you will not be charged for this service.\"    Patient has given verbal consent for Video visit? {YES-NO  Default Yes:4444::\"Yes\"}    How would you like to obtain your AVS? {AVS Preference:097439}    Patient would like the video invitation sent by: {video visit invitation:469596}    Will anyone else be joining your video visit? {:448059}    {If patient encounters technical issues they should call [phone] :141587}    Subjective     Marlin Harman is a 64 year old female who presents to clinic today for the following health issues:    HPI  {SUPERLIST (Optional):501184}       Video Start Time: {video visit start/end time for provider to select:395719}    {additonal problems for provider to add (Optional):484769}    {HIST REVIEW/ LINKS 2 (Optional):434682}    Reviewed and updated as needed this visit by Provider         Review of Systems   {ROS COMP (Optional):275758}      Objective    There were no vitals taken for this visit.  Estimated body mass index is 43.05 kg/m  as calculated from the following:    Height as of 12/10/19: 1.6 m (5' 3\").    Weight as of " "12/10/19: 110.2 kg (243 lb).  Physical Exam     {video visit exam brief selected:246271::\"GENERAL: healthy, alert and no distress\",\"EYES: Eyes grossly normal to inspection, conjunctivae and sclerae normal\",\"RESP: no audible wheeze, cough, or visible cyanosis.  No visible retractions or increased work of breathing.  Able to speak fully in complete sentences.\",\"NEURO: Cranial nerves grossly intact, mentation intact and speech normal\",\"PSYCH: mentation appears normal, affect normal/bright, judgement and insight intact, normal speech and appearance well-groomed\"}      {Diagnostic Test Results (Optional):217432::\"Diagnostic Test Results:\",\"Labs reviewed in Epic\"}        {PROVIDER CHARTING PREFERENCE:414255}      Video-Visit Details    Type of service:  Video Visit    Video End Time:{video visit start/end time for provider to select:152948}    Originating Location (pt. Location): {video visit patient location:744104::\"Home\"}    Distant Location (provider location):  [facility]     Platform used for Video Visit: {Virtual Visit Platforms:615054::\"AmWell\"}    No follow-ups on file.       {signature options:153439}      "

## 2020-05-02 ASSESSMENT — ANXIETY QUESTIONNAIRES: GAD7 TOTAL SCORE: 16

## 2020-06-25 ENCOUNTER — TELEPHONE (OUTPATIENT)
Dept: FAMILY MEDICINE | Facility: CLINIC | Age: 65
End: 2020-06-25

## 2020-06-25 DIAGNOSIS — F32.2 CURRENT SEVERE EPISODE OF MAJOR DEPRESSIVE DISORDER WITHOUT PSYCHOTIC FEATURES, UNSPECIFIED WHETHER RECURRENT (H): ICD-10-CM

## 2020-06-25 RX ORDER — ESCITALOPRAM OXALATE 10 MG/1
10 TABLET ORAL DAILY
Qty: 90 TABLET | Refills: 1 | Status: SHIPPED | OUTPATIENT
Start: 2020-06-25 | End: 2021-02-02

## 2020-06-25 ASSESSMENT — ANXIETY QUESTIONNAIRES
1. FEELING NERVOUS, ANXIOUS, OR ON EDGE: MORE THAN HALF THE DAYS
6. BECOMING EASILY ANNOYED OR IRRITABLE: NOT AT ALL
5. BEING SO RESTLESS THAT IT IS HARD TO SIT STILL: NOT AT ALL
GAD7 TOTAL SCORE: 5
7. FEELING AFRAID AS IF SOMETHING AWFUL MIGHT HAPPEN: NOT AT ALL
3. WORRYING TOO MUCH ABOUT DIFFERENT THINGS: SEVERAL DAYS
2. NOT BEING ABLE TO STOP OR CONTROL WORRYING: SEVERAL DAYS

## 2020-06-25 ASSESSMENT — PATIENT HEALTH QUESTIONNAIRE - PHQ9
SUM OF ALL RESPONSES TO PHQ QUESTIONS 1-9: 4
5. POOR APPETITE OR OVEREATING: SEVERAL DAYS

## 2020-06-25 NOTE — TELEPHONE ENCOUNTER
Dr. Castro,  Patient was called, she says that she was suppose to call with an update on the Lexapro 10 mg. Patient reports that it working very well, says she is noticing very good improvement since being on this and commends PCP as far as helping her with this. Patient says she did call mental health number, was scheduled and then care team cancelled her appointment as they did not take Medicare/[employer], then she was told another team would call her and she never heard back. Did tell the patient to call Mental Health number again to see if someone could follow up with her.    PHQ/ TASHI completed and is 4/5 today.    Patient has only 4 pills left, please advise refill.    CHRISSY Sepulveda

## 2020-06-25 NOTE — TELEPHONE ENCOUNTER
Reason for call:  Patient reporting a symptom    Symptom or request: Update Dr. Castro regarding Depression.   Pt said the medication escitalopram 10 mg one daily  is helping a lot.  Pt would like a new script sent Pharmacy.    Phone Number patient can be reached at:  Home number on file [phone] (home)    Best Time:  Any Time      Can we leave a detailed message on this number:  YES    Call taken on 6/25/2020 at 2:34 PM by Ana Corral

## 2020-06-25 NOTE — TELEPHONE ENCOUNTER
Lilia,     Can you please call mental health and schedule the appointment for psychotherapy .       Dr Castro

## 2020-06-26 ASSESSMENT — ANXIETY QUESTIONNAIRES: GAD7 TOTAL SCORE: 5

## 2020-06-26 NOTE — TELEPHONE ENCOUNTER
I called the number on the  Referral. They will reach out to her again. I spoke to Marlin and she said if she doesn't hear from them today, she will call them. She has the phone number.

## 2020-07-06 ENCOUNTER — VIRTUAL VISIT (OUTPATIENT)
Dept: PSYCHOLOGY | Facility: CLINIC | Age: 65
End: 2020-07-06
Attending: FAMILY MEDICINE
Payer: COMMERCIAL

## 2020-07-06 DIAGNOSIS — F33.2 MAJOR DEPRESSIVE DISORDER, RECURRENT EPISODE, SEVERE WITH ANXIOUS DISTRESS (H): Primary | ICD-10-CM

## 2020-07-06 PROCEDURE — 90834 PSYTX W PT 45 MINUTES: CPT | Mod: 95

## 2020-07-06 ASSESSMENT — PATIENT HEALTH QUESTIONNAIRE - PHQ9
SUM OF ALL RESPONSES TO PHQ QUESTIONS 1-9: 6
SUM OF ALL RESPONSES TO PHQ QUESTIONS 1-9: 6
10. IF YOU CHECKED OFF ANY PROBLEMS, HOW DIFFICULT HAVE THESE PROBLEMS MADE IT FOR YOU TO DO YOUR WORK, TAKE CARE OF THINGS AT HOME, OR GET ALONG WITH OTHER PEOPLE: SOMEWHAT DIFFICULT

## 2020-07-06 ASSESSMENT — ANXIETY QUESTIONNAIRES
2. NOT BEING ABLE TO STOP OR CONTROL WORRYING: SEVERAL DAYS
1. FEELING NERVOUS, ANXIOUS, OR ON EDGE: SEVERAL DAYS
3. WORRYING TOO MUCH ABOUT DIFFERENT THINGS: SEVERAL DAYS
GAD7 TOTAL SCORE: 6
5. BEING SO RESTLESS THAT IT IS HARD TO SIT STILL: NOT AT ALL
7. FEELING AFRAID AS IF SOMETHING AWFUL MIGHT HAPPEN: SEVERAL DAYS
GAD7 TOTAL SCORE: 6
7. FEELING AFRAID AS IF SOMETHING AWFUL MIGHT HAPPEN: SEVERAL DAYS
GAD7 TOTAL SCORE: 6
4. TROUBLE RELAXING: SEVERAL DAYS
6. BECOMING EASILY ANNOYED OR IRRITABLE: SEVERAL DAYS

## 2020-07-06 NOTE — PROGRESS NOTES
Progress Note    Patient Name: Marlin Harman  Date: 7/6/2020         Service Type: Individual      Session Start Time: 1:03 PM  Session End Time: 1:55 PM     Session Length: 47 Minutes    Session #: 1    Attendees: Client attended alone    Service Modality:  Video Visit:    Telemedicine Visit: The patient's condition can be safely assessed and treated via synchronous audio and visual telemedicine encounter.      Reason for Telemedicine Visit: Patient has requested telehealth visit    Originating Site (Patient Location): Patient's other Client is currently living at her [facility] in [facility] as a care taker.    Distant Site (Provider Location): [facility]    Consent:  The patient/guardian has verbally consented to: the potential risks and benefits of telemedicine (video visit) versus in person care; bill my insurance or make self-payment for services provided; and responsibility for payment of non-covered services.     Patient would like the video invitation sent by: Send to e-mail at: [email or web address]}     Mode of Communication:  Video Conference via Amwell    As the provider I attest to compliance with applicable laws and regulations related to telemedicine.     Treatment Plan Last Reviewed: To be established  PHQ-9 / TASHI-7 : Answers for HPI/ROS submitted by the patient on 7/6/2020   If you checked off any problems, how difficult have these problems made it for you to do your work, take care of things at home, or get along with other people?: Somewhat difficult  PHQ9 TOTAL SCORE: 6  TASHI 7 TOTAL SCORE: 6    DATA  Interactive Complexity: No  Crisis: No       Progress Since Last Session (Related to Symptoms / Goals / Homework):   Symptoms: Worsening low mood and anxiety        Episode of Care Goals: No improvement - PREPARATION (Decided to change - considering how); Intervened by negotiating a change plan and determining options /  strategies for behavior change, identifying triggers, exploring social supports, and working towards setting a date to begin behavior change     Current / Ongoing Stressors and Concerns:   Marlin reports feelings of dread and hopelessness and lack of motivation to complete her ADLs. She reports her current situation of care taking for a friend while he goes through chemotherapy has her reliving her childhood of care taking for her mother and being a child of alcoholics. She reports she was started on medication 1 month ago and that it has had a significant improvement on her mood.      Treatment Objective(s) Addressed in This Session:   Obtaining background information as well as relationship building  Started Diagnostic Assessment      Intervention:   Supportive Therapy: provided active listening, support, validation and encouragement through open ended questions.        ASSESSMENT: Current Emotional / Mental Status (status of significant symptoms):   Risk status (Self / Other harm or suicidal ideation)   Patient denies current fears or concerns for personal safety.   Patient denies current or recent suicidal ideation or behaviors.   Patient denies current or recent homicidal ideation or behaviors.   Patient denies current or recent self injurious behavior or ideation.   Patient denies other safety concerns.   Patient reports there has been no change in risk factors since their last session.     Patient reports there has been no change in protective factors since their last session.     Recommended that patient call 911 or go to the local ED should there be a change in any of these risk factors.     Appearance:   Appropriate    Eye Contact:   Good    Psychomotor Behavior: Normal    Attitude:   Cooperative  Pleasant Cautious    Orientation:   All   Speech    Rate / Production: Talkative Normal     Volume:  Normal    Mood:    Anxious  Depressed    Affect:    Flat    Thought Content:  Clear    Thought  Form:  Coherent  Logical    Insight:    Good      Medication Review:   No changes to current psychiatric medication(s)     Medication Compliance:   Yes     Changes in Health Issues:   None reported     Chemical Use Review:   Substance Use: Chemical use reviewed, no active concerns identified      Tobacco Use: Yes, same.  Patient reports frequency of use 6-8 cigarettes daily. Patient declined discussion at this time    Diagnosis:  1. Major depressive disorder, recurrent episode, severe with anxious distress (H)        Collateral Reports Completed:   Not Applicable    PLAN: (Patient Tasks / Therapist Tasks / Other)  Marlin will return in one week for a follow up therapy session to complete the diagnostic assessment.    TARSHA Capps  July 6, 2020  Note reviewed and clinical supervision by MONICA Barbour [facility] 8/5/2020

## 2020-07-07 ASSESSMENT — ANXIETY QUESTIONNAIRES: GAD7 TOTAL SCORE: 6

## 2020-07-07 ASSESSMENT — PATIENT HEALTH QUESTIONNAIRE - PHQ9: SUM OF ALL RESPONSES TO PHQ QUESTIONS 1-9: 6

## 2020-07-13 ENCOUNTER — VIRTUAL VISIT (OUTPATIENT)
Dept: PSYCHOLOGY | Facility: CLINIC | Age: 65
End: 2020-07-13
Payer: COMMERCIAL

## 2020-07-13 DIAGNOSIS — F33.2 MAJOR DEPRESSIVE DISORDER, RECURRENT EPISODE, SEVERE WITH ANXIOUS DISTRESS (H): Primary | ICD-10-CM

## 2020-07-13 PROCEDURE — 90791 PSYCH DIAGNOSTIC EVALUATION: CPT | Mod: 95

## 2020-07-13 ASSESSMENT — PATIENT HEALTH QUESTIONNAIRE - PHQ9
SUM OF ALL RESPONSES TO PHQ QUESTIONS 1-9: 5
10. IF YOU CHECKED OFF ANY PROBLEMS, HOW DIFFICULT HAVE THESE PROBLEMS MADE IT FOR YOU TO DO YOUR WORK, TAKE CARE OF THINGS AT HOME, OR GET ALONG WITH OTHER PEOPLE: SOMEWHAT DIFFICULT
SUM OF ALL RESPONSES TO PHQ QUESTIONS 1-9: 5

## 2020-07-14 ASSESSMENT — PATIENT HEALTH QUESTIONNAIRE - PHQ9: SUM OF ALL RESPONSES TO PHQ QUESTIONS 1-9: 5

## 2020-07-29 ENCOUNTER — VIRTUAL VISIT (OUTPATIENT)
Dept: PSYCHOLOGY | Facility: CLINIC | Age: 65
End: 2020-07-29
Payer: COMMERCIAL

## 2020-07-29 DIAGNOSIS — F33.2 MAJOR DEPRESSIVE DISORDER, RECURRENT EPISODE, SEVERE WITH ANXIOUS DISTRESS (H): Primary | ICD-10-CM

## 2020-07-29 PROCEDURE — 90834 PSYTX W PT 45 MINUTES: CPT | Mod: 95

## 2020-07-29 ASSESSMENT — COLUMBIA-SUICIDE SEVERITY RATING SCALE - C-SSRS
2. HAVE YOU ACTUALLY HAD ANY THOUGHTS OF KILLING YOURSELF?: NO
5. HAVE YOU STARTED TO WORK OUT OR WORKED OUT THE DETAILS OF HOW TO KILL YOURSELF? DO YOU INTEND TO CARRY OUT THIS PLAN?: NO
4. HAVE YOU HAD THESE THOUGHTS AND HAD SOME INTENTION OF ACTING ON THEM?: NO
3. HAVE YOU BEEN THINKING ABOUT HOW YOU MIGHT KILL YOURSELF?: NO
2. HAVE YOU ACTUALLY HAD ANY THOUGHTS OF KILLING YOURSELF LIFETIME?: YES
1. IN THE PAST MONTH, HAVE YOU WISHED YOU WERE DEAD OR WISHED YOU COULD GO TO SLEEP AND NOT WAKE UP?: YES
5. HAVE YOU STARTED TO WORK OUT OR WORKED OUT THE DETAILS OF HOW TO KILL YOURSELF? DO YOU INTEND TO CARRY OUT THIS PLAN?: NO
1. IN THE PAST MONTH, HAVE YOU WISHED YOU WERE DEAD OR WISHED YOU COULD GO TO SLEEP AND NOT WAKE UP?: YES
4. HAVE YOU HAD THESE THOUGHTS AND HAD SOME INTENTION OF ACTING ON THEM?: NO

## 2020-07-29 NOTE — PROGRESS NOTES
Progress Note    Patient Name: Marlin Harman  Date: 7/29/2020         Service Type: Individual      Session Start Time: 10:32 AM  Session End Time: 11:20 AM     Session Length: 48 Minutes    Session #: 3    Attendees: Client    Service Modality:  Video Visit:    Telemedicine Visit: The patient's condition can be safely assessed and treated via synchronous audio and visual telemedicine encounter.      Reason for Telemedicine Visit: Patient has requested telehealth visit    Originating Site (Patient Location): Patient's other Client is currently living at her friends house [facility] as a care taker    Distant Site (Provider Location): [facility]    Consent:  The patient/guardian has verbally consented to: the potential risks and benefits of telemedicine (video visit) versus in person care; bill my insurance or make self-payment for services provided; and responsibility for payment of non-covered services.     Patient would like the video invitation sent by: Send to e-mail at: [email or web address]}     Mode of Communication:  Video Conference via Amwell    As the provider I attest to compliance with applicable laws and regulations related to telemedicine.     Treatment Plan Last Reviewed: To be established  PHQ-9 / TASHI-7 : 7/13/2020    DATA  Interactive Complexity: No  Crisis: No       Progress Since Last Session (Related to Symptoms / Goals / Homework):   Symptoms: Improving Slight improvement in mood and decrease in anxiety    Homework: Newly established      Episode of Care Goals: Minimal progress - PREPARATION (Decided to change - considering how); Intervened by negotiating a change plan and determining options / strategies for behavior change, identifying triggers, exploring social supports, and working towards setting a date to begin behavior change     Current / Ongoing Stressors and Concerns:   Marlin reports gearing up to  "beginning around the clock care taking after her friend's surgery in two weeks. She reports feeling indebted to her friends after they offered housing to her when she return to [place] from [place] a few years back. She reports her debt to them and their difference in willingness to talking about feelings has her \"feeling like there are a lot of egg shells and I'm the elephant that can't move without breaking a whole lot of shells.\"     Treatment Objective(s) Addressed in This Session:   Continuing to obtain background information with an emphasis on relationship buildingdue to client's cautious demeanor.       Intervention:   Motivational Interviewing: Questions around client's awareness of symptoms and its impact on daily functioning, self-esteem and relationships, assessing client's readiness to explore changes and commitment to therapy, emphasizing personal choice and control, discussed barriers to client feel confident in setting boundaries in her relationships.        ASSESSMENT: Current Emotional / Mental Status (status of significant symptoms):   Risk status (Self / Other harm or suicidal ideation)   Patient denies current fears or concerns for personal safety.   Patient denies current or recent suicidal ideation or behaviors.   Patient denies current or recent homicidal ideation or behaviors.   Patient denies current or recent self injurious behavior or ideation.   Patient denies other safety concerns.   Patient reports there has been no change in risk factors since their last session.     Patient reports there has been no change in protective factors since their last session.     Recommended that patient call 911 or go to the local ED should there be a change in any of these risk factors.     Appearance:   Appropriate    Eye Contact:   Good    Psychomotor Behavior: Normal    Attitude:   Cooperative  Cautious    Orientation:   All   Speech    Rate / Production: Normal/ Responsive Talkative    Volume:  Normal "    Mood:    Anxious  Depressed    Affect:    Flat    Thought Content:  Clear    Thought Form:  Coherent  Logical    Insight:    Good      Medication Review:   No changes to current psychiatric medication(s)     Medication Compliance:   Yes     Changes in Health Issues:   None reported     Chemical Use Review:   Substance Use: Chemical use reviewed, no active concerns identified      Tobacco Use: No change in amount of tobacco use since last session.  Patient declined discussion at this time    Diagnosis:  1. Major depressive disorder, recurrent episode, severe with anxious distress (H)        Collateral Reports Completed:   Not Applicable    PLAN: (Patient Tasks / Therapist Tasks / Other)  Marlin will think about what she wants to address together in therapy. She will return for therapy in one week.     TARSHA Capps  7/29/2020  Note reviewed and clinical supervision [facility]

## 2020-08-05 ENCOUNTER — VIRTUAL VISIT (OUTPATIENT)
Dept: PSYCHOLOGY | Facility: CLINIC | Age: 65
End: 2020-08-05
Payer: COMMERCIAL

## 2020-08-05 DIAGNOSIS — F33.2 MAJOR DEPRESSIVE DISORDER, RECURRENT EPISODE, SEVERE WITH ANXIOUS DISTRESS (H): Primary | ICD-10-CM

## 2020-08-05 PROCEDURE — 90834 PSYTX W PT 45 MINUTES: CPT | Mod: 95

## 2020-08-05 NOTE — PROGRESS NOTES
Progress Note    Patient Name: Marlin Harman  Date: 8/5/2020         Service Type: Individual      Session Start Time: 10:32 AM  Session End Time: 11:15 AM     Session Length: 43 Minutes    Session #: 4    Attendees: Client    Service Modality:  Video Visit:    Telemedicine Visit: The patient's condition can be safely assessed and treated via synchronous audio and visual telemedicine encounter.      Reason for Telemedicine Visit: Patient has requested telehealth visit    Originating Site (Patient Location): Patient's other Client is currently living at her friends house in [facility] as a care taker    Distant Site (Provider Location): [facility]    Consent:  The patient/guardian has verbally consented to: the potential risks and benefits of telemedicine (video visit) versus in person care; bill my insurance or make self-payment for services provided; and responsibility for payment of non-covered services.     Patient would like the video invitation sent by: Send to e-mail at: [email or web address]}     Mode of Communication:  Video Conference via Amwell    As the provider I attest to compliance with applicable laws and regulations related to telemedicine.     Treatment Plan Last Reviewed: 8/5/2020  PHQ-9 / TASHI-7 : 7/13/2020    DATA  Interactive Complexity: No  Crisis: No       Progress Since Last Session (Related to Symptoms / Goals / Homework):   Symptoms: No change reports there has been no change in her symptoms    Homework: Achieved / completed to satisfaction      Episode of Care Goals: Minimal progress - PREPARATION (Decided to change - considering how); Intervened by negotiating a change plan and determining options / strategies for behavior change, identifying triggers, exploring social supports, and working towards setting a date to begin behavior change     Current / Ongoing Stressors and Concerns:   Marlin reports feeling  overwhelming dread and disappointed within herself for not addressing her fiances before it got to her current situation. She reports when it comes to life decisions she has often becomes immobilized by anxiety and negative self judgment that she tries to avoid and procrastinate tartine for as long as she possibly can.         Treatment Objective(s) Addressed in This Session:   Identify negative self-talk and behaviors: challenge core beliefs, myths, and actions.  Goal Setting     Intervention:   CBT: Introduction to what is CBT: How you think determines how you feel and how you behave. Provided psychoeducation about how an event occurs, leads to having thought about what occurred, to emotions based upon those thoughts and responding to those thoughts and feelings with behaviors. Discussed how identifying patterns in their thoughts, emotions and behaviors will help them better understand how they impact their life in a significant way. Introduced how a core belief about herself acts like a lens that she views every situation and life experience through directly affecting her thoughts, feelings and behaviors. Harmful core beliefs lead to negative thoughts, feelings, and behaviors, whereas rational core beliefs lead to balanced reactions.       ASSESSMENT: Current Emotional / Mental Status (status of significant symptoms):   Risk status (Self / Other harm or suicidal ideation)   Patient denies current fears or concerns for personal safety.   Patient denies current or recent suicidal ideation or behaviors.   Patient denies current or recent homicidal ideation or behaviors.   Patient denies current or recent self injurious behavior or ideation.   Patient denies other safety concerns.   Patient reports there has been no change in risk factors since their last session.     Patient reports there has been no change in protective factors since their last session.     Recommended that patient call 911 or go to the local ED  should there be a change in any of these risk factors.     Appearance:   Appropriate    Eye Contact:   Good    Psychomotor Behavior: Normal    Attitude:   Cooperative  Cautious    Orientation:   All   Speech    Rate / Production: Normal/ Responsive Talkative    Volume:  Normal    Mood:    Anxious  Depressed    Affect:    Flat  Tearful   Thought Content:  Clear    Thought Form:  Coherent  Logical    Insight:    Good      Medication Review:   No changes to current psychiatric medication(s)     Medication Compliance:   Yes     Changes in Health Issues:   None reported     Chemical Use Review:   Substance Use: Chemical use reviewed, no active concerns identified      Tobacco Use: No change in amount of tobacco use since last session.  Patient declined discussion at this time    Diagnosis:  1. Major depressive disorder, recurrent episode, severe with anxious distress (H)        Collateral Reports Completed:   Not Applicable    PLAN: (Patient Tasks / Therapist Tasks / Other)  Marlin will build awareness of her thoughts, feelings and behaviors and bring two examples to our next session. She will return for therapy in two weeks.     Leighann Hurley, [facility]  8/5/2020  Note reviewed and clinical supervision by MONICA Barbour [facility] 8/11/2020   ____________________________________________________________                                               Treatment Plan    Client's Name: Marlin Harman  YOB: 1955    Date: 8/5/2020    DSM-V Diagnoses: 296.33 (F33.2) Major Depressive Disorder, Recurrent Episode, Severe _ and With anxious distress  Psychosocial / Contextual Factors: limited family and social support system, housing and financial stress  WHODAS: 24    Referral / Collaboration:  Referral to another professional/service is not indicated at this time..    Anticipated number of session or this episode of care: 12      MeasurableTreatment Goal(s) related to diagnosis / functional impairment(s)  Goal  1: Client will become more aware of her anxiety and depression and utilize the skills taught to decrease her anxious and depressive symptoms.    I will know I've met my goal when Feel like I can deal with the things in my life that are come up without being immobilized by dread.      Objective #A (Client Action)    Client will Identify negative self-talk and behaviors: challenge core beliefs, myths, and actions.  Status: New - Date: 8/5/2020     Intervention(s)  Therapist will provide educational materials on core beliefs, cognitive distortions, teach emotional regulation skills, including accepting emotions and thoughts.    Objective #B  Client will Decrease frequency and intensity of feeling down, depressed, hopeless.  Status: New - Date: 8/5/2020     Intervention(s)  Therapist will provide educational materials and handouts on core beliefs, cognitive distortions, and ACT, including exploring and identifying important values in client's life.    Objective #C  Client will identify 3 positives concerning self-esteem each session of therapy.  Status: New - Date: 8/5/2020     Intervention(s)  Therapist will use components of DBT and CBT strategies to understand emotions, understand thought process, and the impact on functioning.    Patient has reviewed and agreed to the above plan.      TARSHA Capps  August 5, 2020  Note reviewed and clinical supervision by MONICA Barbour [facility] 8/11/2020

## 2020-12-29 ENCOUNTER — TELEPHONE (OUTPATIENT)
Dept: URGENT CARE | Facility: URGENT CARE | Age: 65
End: 2020-12-29

## 2020-12-29 DIAGNOSIS — I10 BENIGN ESSENTIAL HYPERTENSION: ICD-10-CM

## 2020-12-29 RX ORDER — HYDROCHLOROTHIAZIDE 25 MG/1
25 TABLET ORAL DAILY
Qty: 30 TABLET | Refills: 0 | Status: SHIPPED | OUTPATIENT
Start: 2020-12-29 | End: 2021-02-02

## 2020-12-29 NOTE — TELEPHONE ENCOUNTER
Reason for Call:  Other call back    Detailed comments: patient needing Hydrochlorothiazide refilled please call patient back once prescription is filled     Phone Number Patient can be reached at: Cell number on file:    Telephone Information:   Mobile [phone]       Best Time: anytime    Can we leave a detailed message on this number? YES    Call taken on 12/29/2020 at 1:39 PM by Sushila Zavala

## 2021-01-03 ENCOUNTER — HEALTH MAINTENANCE LETTER (OUTPATIENT)
Age: 66
End: 2021-01-03

## 2021-01-15 NOTE — PROGRESS NOTES
[facility] RN Care Coordination Note:    Procedure follow up recommendations - patient to have repeat ERCP in 6 weeks for stent removal.     Orders placed.     Ileana Steele RN   Care Coordinator   [phone]        
Detail Level: Detailed

## 2021-02-02 ENCOUNTER — VIRTUAL VISIT (OUTPATIENT)
Dept: FAMILY MEDICINE | Facility: CLINIC | Age: 66
End: 2021-02-02
Payer: COMMERCIAL

## 2021-02-02 DIAGNOSIS — Z72.0 TOBACCO ABUSE: ICD-10-CM

## 2021-02-02 DIAGNOSIS — Z78.0 POST-MENOPAUSAL: ICD-10-CM

## 2021-02-02 DIAGNOSIS — I10 BENIGN ESSENTIAL HYPERTENSION: Primary | ICD-10-CM

## 2021-02-02 DIAGNOSIS — F32.2 CURRENT SEVERE EPISODE OF MAJOR DEPRESSIVE DISORDER WITHOUT PSYCHOTIC FEATURES, UNSPECIFIED WHETHER RECURRENT (H): ICD-10-CM

## 2021-02-02 DIAGNOSIS — G47.33 OSA (OBSTRUCTIVE SLEEP APNEA): ICD-10-CM

## 2021-02-02 DIAGNOSIS — J30.1 CHRONIC SEASONAL ALLERGIC RHINITIS DUE TO POLLEN: ICD-10-CM

## 2021-02-02 PROCEDURE — 99214 OFFICE O/P EST MOD 30 MIN: CPT | Mod: 95 | Performed by: FAMILY MEDICINE

## 2021-02-02 RX ORDER — ESCITALOPRAM OXALATE 10 MG/1
10 TABLET ORAL DAILY
Qty: 90 TABLET | Refills: 1 | Status: SHIPPED | OUTPATIENT
Start: 2021-02-02

## 2021-02-02 RX ORDER — HYDROCHLOROTHIAZIDE 25 MG/1
25 TABLET ORAL DAILY
Qty: 90 TABLET | Refills: 1 | Status: SHIPPED | OUTPATIENT
Start: 2021-02-02

## 2021-02-02 RX ORDER — POLYETHYLENE GLYCOL 3350 17 G
2 POWDER IN PACKET (EA) ORAL
Qty: 72 LOZENGE | Refills: 0 | Status: SHIPPED | OUTPATIENT
Start: 2021-02-02

## 2021-02-02 RX ORDER — ALBUTEROL SULFATE 90 UG/1
2 AEROSOL, METERED RESPIRATORY (INHALATION) EVERY 6 HOURS PRN
Qty: 18 G | Refills: 3 | Status: SHIPPED | OUTPATIENT
Start: 2021-02-02

## 2021-02-02 ASSESSMENT — ANXIETY QUESTIONNAIRES
3. WORRYING TOO MUCH ABOUT DIFFERENT THINGS: NOT AT ALL
7. FEELING AFRAID AS IF SOMETHING AWFUL MIGHT HAPPEN: NOT AT ALL
GAD7 TOTAL SCORE: 0
5. BEING SO RESTLESS THAT IT IS HARD TO SIT STILL: NOT AT ALL
1. FEELING NERVOUS, ANXIOUS, OR ON EDGE: NOT AT ALL
2. NOT BEING ABLE TO STOP OR CONTROL WORRYING: NOT AT ALL
6. BECOMING EASILY ANNOYED OR IRRITABLE: NOT AT ALL

## 2021-02-02 ASSESSMENT — PATIENT HEALTH QUESTIONNAIRE - PHQ9
5. POOR APPETITE OR OVEREATING: NOT AT ALL
SUM OF ALL RESPONSES TO PHQ QUESTIONS 1-9: 2

## 2021-02-02 NOTE — PROGRESS NOTES
Marlin is a 65 year old who is being evaluated via a billable video visit.      How would you like to obtain your AVS? MyChart  If the video visit is dropped, the invitation should be resent by: Text to cell phone: [phone]  Will anyone else be joining your video visit? No      Video Start Time: 1:39 PM    Assessment & Plan     Benign essential hypertension  Home blood pressure readings within target goal of less than 140/90.  Suggested to continue hydrochlorothiazide. Healthy lifestyle modifications stressed including regular exercise, balanced diet, weight loss and limiting salt/caffeine/pop intake.  - hydrochlorothiazide (HYDRODIURIL) 25 MG tablet; Take 1 tablet (25 mg) by mouth daily Needs appointment for further refills    Current severe episode of major depressive disorder without psychotic features, unspecified whether recurrent (H)  Symptoms are stable, suggested to continue Lexapro  - escitalopram (LEXAPRO) 10 MG tablet; Take 1 tablet (10 mg) by mouth daily    Chronic seasonal allergic rhinitis due to pollen  - albuterol (PROAIR HFA/PROVENTIL HFA/VENTOLIN HFA) 108 (90 Base) MCG/ACT inhaler; Inhale 2 puffs into the lungs every 6 hours as needed for shortness of breath / dyspnea or wheezing    (Z72.0) Tobacco abuse  (primary encounter diagnosis)  Comment: smoking 3-4 packs/week, associated risk factors explained, agreeable to try nicotine lozenges       (G47.33) BRITT (obstructive sleep apnea)  Comment: using Bipap, suggested to schedule appointment with sleep medicine for BiPAP machine accessories order  Plan: SLEEP EVALUATION & MANAGEMENT REFERRAL - [facility]         [phone] (Age 13 and up if over 100 lbs)              30 minutes spent on the date of the encounter doing chart review, review of test results, patient visit and documentation          Tobacco Cessation:   reports that she has been smoking cigarettes. She has been smoking about 0.30 packs per day.  She has never used smokeless tobacco.  Tobacco Cessation Action Plan: Pharmacotherapies : nicotine lozenges         Zechariah Castro MD  [facility]    Regino Isaac is a 65 year old who presents to clinic today for the following health issues     HPI   Medication Followup of Albuterol    Taking Medication as prescribed: yes    Side Effects:  None    Medication Helping Symptoms:  yes       Hypertension Follow-up      Do you check your blood pressure regularly outside of the clinic? No     Are you following a low salt diet? Yes    Are your blood pressures ever more than 140 on the top number (systolic) OR more   than 90 on the bottom number (diastolic), for example 140/90? No    Depression Followup    How are you doing with your depression since your last visit? Improved     Are you having other symptoms that might be associated with depression? No    Have you had a significant life event?  OTHER: yes too much     Are you feeling anxious or having panic attacks?   No    Do you have any concerns with your use of alcohol or other drugs? No    Social History     Tobacco Use     Smoking status: Light Tobacco Smoker     Packs/day: 0.30     Types: Cigarettes     Smokeless tobacco: Never Used     Tobacco comment: 4-6 cigs/day, smoked since age 18, at most 2 packs per day   Substance Use Topics     Alcohol use: Yes     Frequency: Monthly or less     Drinks per session: 1 or 2     Binge frequency: Never     Comment: rare, a couple per year     Drug use: No     PHQ 7/6/2020 7/13/2020 2/2/2021   PHQ-9 Total Score 6 5 2   Q9: Thoughts of better off dead/self-harm past 2 weeks Not at all Not at all Not at all     TASHI-7 SCORE 6/25/2020 7/6/2020 2/2/2021   Total Score - 6 (mild anxiety) -   Total Score 5 6 0     Last PHQ-9 2/2/2021   1.  Little interest or pleasure in doing things 0   2.  Feeling down, depressed, or hopeless 1   3.  Trouble falling or staying asleep, or sleeping too much 0   4.   Feeling tired or having little energy 0   5.  Poor appetite or overeating 1   6.  Feeling bad about yourself 0   7.  Trouble concentrating 0   8.  Moving slowly or restless 0   Q9: Thoughts of better off dead/self-harm past 2 weeks 0   PHQ-9 Total Score 2   Difficulty at work, home, or with people -     TASHI-7  2/2/2021   1. Feeling nervous, anxious, or on edge 0   2. Not being able to stop or control worrying 0   3. Worrying too much about different things 0   4. Trouble relaxing 0   5. Being so restless that it is hard to sit still 0   6. Becoming easily annoyed or irritable 0   7. Feeling afraid, as if something awful might happen 0   TASHI-7 Total Score 0   If you checked any problems, how difficult have they made it for you to do your work, take care of things at home, or get along with other people? -     In the past two weeks have you had thoughts of suicide or self-harm?  No.    Do you have concerns about your personal safety or the safety of others?   No    Suicide Assessment Five-step Evaluation and Treatment (SAFE-T)      How many servings of fruits and vegetables do you eat daily?  2-3    On average, how many sweetened beverages do you drink each day (Examples: soda, juice, sweet tea, etc.  Do NOT count diet or artificially sweetened beverages)?   0    How many days per week do you exercise enough to make your heart beat faster? 3 or less    How many minutes a day do you exercise enough to make your heart beat faster? 9 or less  How many days per week do you miss taking your medication? 7    What makes it hard for you to take your medications?  no refills were left so went off for 4 weeks.         Review of Systems   Constitutional, HEENT, cardiovascular, pulmonary, GI, , musculoskeletal, neuro, skin, endocrine and psych systems are negative, except as otherwise noted.      Objective           Vitals:  No vitals were obtained today due to virtual visit.    Physical Exam   GENERAL: alert and no  distress  EYES: Eyes grossly normal to inspection.  No discharge or erythema, or obvious scleral/conjunctival abnormalities.  RESP: No audible wheeze, cough, or visible cyanosis.  No visible retractions or increased work of breathing.    SKIN: Visible skin clear. No significant rash, abnormal pigmentation or lesions.  NEURO: Cranial nerves grossly intact.  Mentation and speech appropriate for age.  PSYCH: Mentation appears normal, affect normal/bright, judgement and insight intact, normal speech and appearance well-groomed.      ----- Ambulatory Services Attestations for Billing on Time -----        Video-Visit Details    Type of service:  Video Visit    Video End Time:2:00 pm    Originating Location (pt. Location): Home    Distant Location (provider location):  [facility]     Platform used for Video Visit: [employer]

## 2021-02-03 ASSESSMENT — ANXIETY QUESTIONNAIRES: GAD7 TOTAL SCORE: 0

## 2021-02-13 DIAGNOSIS — I10 BENIGN ESSENTIAL HYPERTENSION: ICD-10-CM

## 2021-02-13 LAB
ANION GAP SERPL CALCULATED.3IONS-SCNC: 2 MMOL/L (ref 3–14)
BUN SERPL-MCNC: 20 MG/DL (ref 7–30)
CALCIUM SERPL-MCNC: 9.3 MG/DL (ref 8.5–10.1)
CHLORIDE SERPL-SCNC: 105 MMOL/L (ref 94–109)
CHOLEST SERPL-MCNC: 174 MG/DL
CO2 SERPL-SCNC: 34 MMOL/L (ref 20–32)
CREAT SERPL-MCNC: 0.58 MG/DL (ref 0.52–1.04)
ERYTHROCYTE [DISTWIDTH] IN BLOOD BY AUTOMATED COUNT: 14.2 % (ref 10–15)
GFR SERPL CREATININE-BSD FRML MDRD: >90 ML/MIN/{1.73_M2}
GLUCOSE SERPL-MCNC: 93 MG/DL (ref 70–99)
HCT VFR BLD AUTO: 42.2 % (ref 35–47)
HDLC SERPL-MCNC: 54 MG/DL
HGB BLD-MCNC: 13.8 G/DL (ref 11.7–15.7)
LDLC SERPL CALC-MCNC: 102 MG/DL
MCH RBC QN AUTO: 28.8 PG (ref 26.5–33)
MCHC RBC AUTO-ENTMCNC: 32.7 G/DL (ref 31.5–36.5)
MCV RBC AUTO: 88 FL (ref 78–100)
NONHDLC SERPL-MCNC: 120 MG/DL
PLATELET # BLD AUTO: 223 10E9/L (ref 150–450)
POTASSIUM SERPL-SCNC: 3.7 MMOL/L (ref 3.4–5.3)
RBC # BLD AUTO: 4.79 10E12/L (ref 3.8–5.2)
SODIUM SERPL-SCNC: 141 MMOL/L (ref 133–144)
TRIGL SERPL-MCNC: 88 MG/DL
WBC # BLD AUTO: 10.5 10E9/L (ref 4–11)

## 2021-02-13 PROCEDURE — 80048 BASIC METABOLIC PNL TOTAL CA: CPT | Performed by: FAMILY MEDICINE

## 2021-02-13 PROCEDURE — 80061 LIPID PANEL: CPT | Performed by: FAMILY MEDICINE

## 2021-02-13 PROCEDURE — 36415 COLL VENOUS BLD VENIPUNCTURE: CPT | Performed by: FAMILY MEDICINE

## 2021-02-13 PROCEDURE — 85027 COMPLETE CBC AUTOMATED: CPT | Performed by: FAMILY MEDICINE

## 2021-06-28 ENCOUNTER — TELEPHONE (OUTPATIENT)
Dept: FAMILY MEDICINE | Facility: CLINIC | Age: 66
End: 2021-06-28

## 2021-06-28 NOTE — TELEPHONE ENCOUNTER
Patient Quality Outreach      Summary:    Patient has the following on her problem list/HM:     Depression / Dysthymia review    6 Month Remission: 4-8 month window range:   12 Month Remission: 10-14 month window range:        PHQ-9 SCORE 7/6/2020 7/13/2020 2/2/2021   PHQ-9 Total Score MyChart 6 (Mild depression) 5 (Mild depression) -   PHQ-9 Total Score 6 5 2       If PHQ-9 recheck is 5 or more, route to provider for next steps.    Patient is due/failing the following:   PHQ-9 Needed    Type of outreach:    Sent [employer] message.    Questions for provider review:    None                                                                                                                                     Alexandra Steele MA

## 2021-09-09 ENCOUNTER — APPOINTMENT (OUTPATIENT)
Dept: URBAN - METROPOLITAN AREA CLINIC 254 | Age: 66
Setting detail: DERMATOLOGY
End: 2021-09-11

## 2021-09-09 VITALS — WEIGHT: 243 LBS | HEIGHT: 61 IN

## 2021-09-09 VITALS — HEIGHT: 61 IN | WEIGHT: 243 LBS

## 2021-09-09 DIAGNOSIS — D485 NEOPLASM OF UNCERTAIN BEHAVIOR OF SKIN: ICD-10-CM

## 2021-09-09 DIAGNOSIS — L82.1 OTHER SEBORRHEIC KERATOSIS: ICD-10-CM

## 2021-09-09 DIAGNOSIS — L57.0 ACTINIC KERATOSIS: ICD-10-CM

## 2021-09-09 DIAGNOSIS — L85.3 XEROSIS CUTIS: ICD-10-CM

## 2021-09-09 DIAGNOSIS — L81.4 OTHER MELANIN HYPERPIGMENTATION: ICD-10-CM

## 2021-09-09 PROBLEM — D48.5 NEOPLASM OF UNCERTAIN BEHAVIOR OF SKIN: Status: ACTIVE | Noted: 2021-09-09

## 2021-09-09 PROCEDURE — OTHER PRESCRIPTION MEDICATION MANAGEMENT: OTHER

## 2021-09-09 PROCEDURE — 11302 SHAVE SKIN LESION 1.1-2.0 CM: CPT

## 2021-09-09 PROCEDURE — 17000 DESTRUCT PREMALG LESION: CPT | Mod: 59

## 2021-09-09 PROCEDURE — 17003 DESTRUCT PREMALG LES 2-14: CPT

## 2021-09-09 PROCEDURE — OTHER LIQUID NITROGEN: OTHER

## 2021-09-09 PROCEDURE — OTHER COUNSELING: OTHER

## 2021-09-09 PROCEDURE — OTHER PRESCRIPTION: OTHER

## 2021-09-09 PROCEDURE — 99204 OFFICE O/P NEW MOD 45 MIN: CPT | Mod: 25

## 2021-09-09 PROCEDURE — OTHER SHAVE REMOVAL: OTHER

## 2021-09-09 RX ORDER — TRIAMCINOLONE ACETONIDE 1 MG/G
0.1% CREAM TOPICAL BID
Qty: 1 | Refills: 1 | Status: ERX | COMMUNITY
Start: 2021-09-09

## 2021-09-09 ASSESSMENT — LOCATION SIMPLE DESCRIPTION DERM
LOCATION SIMPLE: LEFT FOREARM
LOCATION SIMPLE: NOSE
LOCATION SIMPLE: UPPER BACK
LOCATION SIMPLE: LEFT LIP
LOCATION SIMPLE: RIGHT FOREARM

## 2021-09-09 ASSESSMENT — LOCATION ZONE DERM
LOCATION ZONE: ARM
LOCATION ZONE: LIP
LOCATION ZONE: TRUNK
LOCATION ZONE: NOSE

## 2021-09-09 ASSESSMENT — LOCATION DETAILED DESCRIPTION DERM
LOCATION DETAILED: NASAL DORSUM
LOCATION DETAILED: LEFT PROXIMAL DORSAL FOREARM
LOCATION DETAILED: RIGHT PROXIMAL DORSAL FOREARM
LOCATION DETAILED: INFERIOR THORACIC SPINE
LOCATION DETAILED: LEFT UPPER CUTANEOUS LIP

## 2021-09-09 NOTE — PROCEDURE: PRESCRIPTION MEDICATION MANAGEMENT
Render In Strict Bullet Format?: No
Detail Level: Zone
Initiate Treatment: Mix with CeraVe cream 1:1 and apply to body daily for.

## 2021-09-09 NOTE — PROCEDURE: SHAVE REMOVAL
Add Variable For Additional Medical Justification: No
Biopsy Method: Dermablade
Notification Instructions: Patient will be notified of pathology results. However, patient instructed to call the office if not contacted within 2 weeks.
Medical Necessity Clause: This procedure was medically necessary because the lesion that was treated was:
Billing Type: Third-Party Bill
Size Of Lesion In Cm (Required): 1.7
X Size Of Lesion In Cm (Optional): 0
Anesthesia Volume In Cc: 1.5
Anesthesia Type: 1% lidocaine with epinephrine
Post-Care Instructions: I reviewed with the patient in detail post-care instructions. Patient is to keep the biopsy site dry overnight, and then apply bacitracin twice daily until healed. Patient may apply hydrogen peroxide soaks to remove any crusting.
Wound Care: Petrolatum
Hemostasis: Drysol
Was A Bandage Applied: Yes
Detail Level: Detailed
Consent was obtained from the patient. The risks and benefits to therapy were discussed in detail. Specifically, the risks of infection, scarring, bleeding, prolonged wound healing, incomplete removal, allergy to anesthesia, nerve injury and recurrence were addressed. Prior to the procedure, the treatment site was clearly identified and confirmed by the patient. All components of Universal Protocol/PAUSE Rule completed.
Medical Necessity Information: It is in your best interest to select a reason for this procedure from the list below. All of these items fulfill various CMS LCD requirements except the new and changing color options.

## 2021-10-06 ENCOUNTER — APPOINTMENT (OUTPATIENT)
Dept: URBAN - METROPOLITAN AREA CLINIC 254 | Age: 66
Setting detail: DERMATOLOGY
End: 2021-10-06

## 2021-10-06 DIAGNOSIS — L85.3 XEROSIS CUTIS: ICD-10-CM

## 2021-10-06 DIAGNOSIS — Z87.2 PERSONAL HISTORY OF DISEASES OF THE SKIN AND SUBCUTANEOUS TISSUE: ICD-10-CM

## 2021-10-06 PROCEDURE — 99214 OFFICE O/P EST MOD 30 MIN: CPT

## 2021-10-06 PROCEDURE — OTHER PRESCRIPTION MEDICATION MANAGEMENT: OTHER

## 2021-10-06 PROCEDURE — OTHER PRESCRIPTION: OTHER

## 2021-10-06 PROCEDURE — OTHER COUNSELING: OTHER

## 2021-10-06 RX ORDER — TRIAMCINOLONE ACETONIDE 1 MG/G
0.1% CREAM TOPICAL BID
Qty: 454 | Refills: 1 | Status: ERX

## 2021-10-06 ASSESSMENT — LOCATION DETAILED DESCRIPTION DERM
LOCATION DETAILED: NASAL DORSUM
LOCATION DETAILED: INFERIOR THORACIC SPINE
LOCATION DETAILED: LEFT UPPER CUTANEOUS LIP

## 2021-10-06 ASSESSMENT — LOCATION SIMPLE DESCRIPTION DERM
LOCATION SIMPLE: NOSE
LOCATION SIMPLE: UPPER BACK
LOCATION SIMPLE: LEFT LIP

## 2021-10-06 ASSESSMENT — LOCATION ZONE DERM
LOCATION ZONE: NOSE
LOCATION ZONE: TRUNK
LOCATION ZONE: LIP

## 2021-10-06 NOTE — PROCEDURE: PRESCRIPTION MEDICATION MANAGEMENT
Render In Strict Bullet Format?: No
Detail Level: Zone
Plan: Mix with CeraVe cream 1:1 and apply to body daily.

## 2021-10-10 ENCOUNTER — HEALTH MAINTENANCE LETTER (OUTPATIENT)
Age: 66
End: 2021-10-10

## 2022-01-04 NOTE — BRIEF OP NOTE
ERCP 09/13/2018  9:50 AM [facility], [address] [place] [address] [phone]     Endoscopy Department   _______________________________________________________________________________   Patient Name: Marlin Harman         Procedure Date: 9/13/2018 9:50 AM   MRN: [id number]                       Account Number: [id number]   YOB: 1955              Admit Type: Outpatient   Age: 62                                Gender: Female   Note Status: Finalized                Attending MD: Manny Cooper MD   Pause for the Cause: time out performed Total Sedation Time:   _______________________________________________________________________________       Procedure:           ERCP   Indications:         Bile duct stone(s); 61yo female with h/o obesity and HTN                        who initially presented to PCP with painless jaundice on                        8/2/18 with MR revealing two 1.3 cm stones (one in CBD,                        one in atrophic gallbladder) causing obstruction. MRCP                        also shows a narrow tapered duct distal to obstruction.                        S/p ERCP on 8/22/18 with placement of a covered metal                        stent to dilate distal biliary stricture and a double                        pigtail stent in CBD to allow drainage with a                        prophylactic PD stent placed as well. Clinically, doing                        well since then with normal LFTs. Plan for ERCP with                        Spyglass for full stone clearance.   Providers:           Manny Cooper MD   Referring MD:        Mouna Concepcion   Medicines:           General Anesthesia, Levaquin 500 mg IV, Indomethacin 100                        mg NM   Complications:       No immediate complications. Estimated blood loss:                        Minimal.   _______________________________________________________________________________  Pt is scheduled for carotid doppler on 1/5/22.   Procedure:           Pre-Anesthesia Assessment:                        - Prior to the procedure, a History and Physical was                        performed, and patient medications and allergies were                        reviewed. The patient is competent. The risks and                        benefits of the procedure and the sedation options and                        risks were discussed with the patient. All questions                        were answered and informed consent was obtained. Patient                        identification and proposed procedure were verified by                        the physician, the nurse, the anesthesiologist and the                        anesthetist in the procedure room. Mental Status                        Examination: alert and oriented. Airway Examination:                        normal oropharyngeal airway and neck mobility.                        Respiratory Examination: clear to auscultation. CV                        Examination: normal. Prophylactic Antibiotics: The                        patient requires prophylactic antibiotics for the                        planned ERCP in an obstructed bile duct. The patient                        received antibiotic therapy before the procedure. Prior                        Anticoagulants: The patient has taken no previous                        anticoagulant or antiplatelet agents. ASA Grade                        Assessment: III - A patient with severe systemic                        disease. After reviewing the risks and benefits, the                        patient was deemed in satisfactory condition to undergo                        the procedure. The anesthesia plan was to use general                        anesthesia. Immediately prior to administration of                        medications, the patient was re-assessed for adequacy to                        receive sedatives. The heart rate, respiratory rate,                         oxygen saturations, blood pressure, adequacy of                        pulmonary ventilation, and response to care were                        monitored throughout the procedure. The physical status                        of the patient was re-assessed after the procedure.                        After obtaining informed consent, the scope was passed                        under direct vision. Throughout the procedure, the                        patient's blood pressure, pulse, and oxygen saturations                        were monitored continuously. The Duodenoscope was                        introduced through the mouth, and used to inject                        contrast into and used to inject contrast into the bile                        duct. The ERCP was accomplished without difficulty. The                        patient tolerated the procedure well.                                                                                     Findings:        A covered metal biliary stent, double pigtail plastic biliary stent, and        pancreatic duct stent were visible on the  film. The esophagus was        successfully intubated under direct vision. The scope was advanced from        the mouth to the duodenum. The pharynx, larynx and associated        structures, as well as the upper GI tract, were normal. A covered metal        stent, a double pigtail plastic biliary stent through the metal stent,        and a pancreatic stent originating in the common bile duct and        pancreatic duct respectively were emerging from the major papilla. The        stents were visibly patent. A biliary sphincterotomy had been performed.        The sphincterotomy appeared open. The three stents were removed although        attempted to leave pancreatic stent in place during the rest of the        procedure but this became dislodged. The bile duct was deeply cannulated        with the 12 mm balloon. Contrast was  injected. I personally interpreted        the bile duct images. There was brisk flow of contrast through the        ducts. Image quality was excellent. Contrast extended to the entire        biliary tree. The middle third of the main bile duct contained filling        defect(s) thought to be a stone. Visiglide wire was passed into the        biliary tree. The biliary tree was swept with a 12 mm balloon starting        at the bifurcation. Sludge was swept from the distal duct. The bile duct        was explored endoscopically using the SpyGlass direct visualization        system. The SpyScope was advanced to the bifurcation. Visibility with        the scope was excellent. The middle third of the main bile duct        contained one stone, which was 20 mm in diameter and at the cystic duct        take off. The lower third of the main bile duct contained a single        segmental stenosis that was benign, inflammatory appearing.        Electrohydraulic lithotripsy was successful. The biliary tree was swept        with a 12 mm balloon and basket starting at the bifurcation. Sludge was        swept from the duct. Many stones were removed. A few stone fragments        remained in the cystic duct and a single stone in the gallbladder. The        bile duct was explored endoscopically using the SpyGlass direct        visualization system again. The SpyScope was advanced to the gallbladder        neck. Visibility with the scope was excellent. The gallbladder contained        a frond-like/villous polyp at the neck. This polyp was biopsied with a        [employer] miniature biopsy forceps for histology. . The cystic duct        contained stone fragments which was 5 mm in diameter. The Spyscope could        not be advanced into the gallbaldder fundus to visualize the single        stone remaining in the gallbladder. The biliary tree was swept with a 12        mm balloon starting at the gallbladder neck. Sludge was swept from the         duct. A few stones were removed. Preparations were made for        cholangiography using balloon occlusion technique. A balloon-tipped        catheter was advanced into the common bile duct. The balloon was        inflated to 12 mm in size. Contrast was then injected into the biliary        tree and opacified the left and right hepatic ducts and all intrahepatic        branches. The gallbladder contained one stone, which was 10 mm in        diameter.                                                                                     Impression:          - A covered metal stent, a double pigtail plastic                        biliary stent through the metal stent, and a pancreatic                        stent originating in the common bile duct and pancreatic                        duct respectively were emerging from the major papilla                        and patent. These were removed                        - Prior biliary sphincterotomy appeared open.                        - A filling defect consistent with a stone was seen on                        the cholangiogram in the mid-CBD which was also                        obstructing the cystic duct take off.                        - Spyglass cholangioscopy performed visualizing the 20                        mm stone. EHL was performed successfully followed by                        stone and sludge removal with a basket and balloon from                        the main bile duct.                        - A single segmental biliary stricture was found in the                        lower third of the main bile duct. The stricture was                        benign appearing and inflammatory by cholangioscopy.                        - The cholangioscope was advanced into the gallbladder                        neck but could not be maneuvered into the gallbalder                        fundus where a single remaining stone could be seen by                         fluroscopy (and recent MRCP). There was a                        frond-like/villous polyp in the gallbladder neck that                        likely is inflammatory but this was biopsied.                        - The cystic duct/gallbladder neck and bile duct were                        swept with removal of remaining stone fragments.                        - Balloon occlusion cholangiogram showed no remaining                        stones in the main bile duct or cystic duct but did                        demonstrate the stone remaining in the gallbladder.                        - No stents placed as drainage was brisk and distal                        stricture appeared to have been sufficiently dilated                        from previously placed covered metal stent.                        - Modifier 22 as procedure was ~2 hours and multiple                        devices were required throughout this complex procedure.   Recommendation:      - Discharge patient to home (ambulatory).                        - Await path results.                        - Levofloxacin 500 mg PO daily for 5 days                        - Refer to a surgeon for consideration of a                        cholecystectomy.                        - No follow up ERCP needed at this time                                                                                      Manny Cooper MD

## 2022-01-29 ENCOUNTER — HEALTH MAINTENANCE LETTER (OUTPATIENT)
Age: 67
End: 2022-01-29

## 2022-09-18 ENCOUNTER — HEALTH MAINTENANCE LETTER (OUTPATIENT)
Age: 67
End: 2022-09-18

## 2023-01-28 ENCOUNTER — HEALTH MAINTENANCE LETTER (OUTPATIENT)
Age: 68
End: 2023-01-28

## 2023-05-07 ENCOUNTER — HEALTH MAINTENANCE LETTER (OUTPATIENT)
Age: 68
End: 2023-05-07

## 2024-04-16 NOTE — PLAN OF CARE
Problem: Patient Care Overview  Goal: Plan of Care/Patient Progress Review  Outcome: No Change  AVSS. Pt took PRN oxycodone for back pain w/ relief. Regular diet, denies nausea. Pt up independently, ambulated in hallway. Voiding spont, not saving. Abx through PIV. Continue w/ POC.       Yes

## (undated) DEVICE — SU VICRYL 2-0 TIE 54" J615H

## (undated) DEVICE — KIT CONNECTOR FOR OLYMPUS ENDOSCOPES DEFENDO 100310

## (undated) DEVICE — TAPE CLOTH 3" CARDINAL 3TRCL03

## (undated) DEVICE — ENDO TROCAR SLEEVE KII Z-THREADED 05X100MM CTS02

## (undated) DEVICE — STPL LINEAR CUT 55MM TLC55

## (undated) DEVICE — SU VICRYL 2-0 REEL 54" UND J286G

## (undated) DEVICE — ENDO TROCAR FIRST ENTRY KII FIOS Z-THRD 05X100MM CTF03

## (undated) DEVICE — SU VICRYL 3-0 SH 27" UND J416H

## (undated) DEVICE — FCP BIOPSY SPYBITE 1.0MMX266CM 4627

## (undated) DEVICE — NDL INSUFFLATION 13GA 120MM C2201

## (undated) DEVICE — SU PDS II 1 TP-1 54" Z879G

## (undated) DEVICE — ENDO SNARE POLYPECTOMY OVAL 15MM LOOP SD-240U-15

## (undated) DEVICE — ENDO BITE BLOCK ADULT OMNI-BLOC

## (undated) DEVICE — SYR 10ML SLIP TIP W/O NDL 303134

## (undated) DEVICE — SUCTION IRR STRYKERFLOW II W/TIP 250-070-520

## (undated) DEVICE — ENDO TUBING CO2 SMARTCAP STERILE DISP 100145CO2EXT

## (undated) DEVICE — DRSG PRIMAPORE 02X3" 7133

## (undated) DEVICE — ENDO BASKET RETRIEVAL TRAPEZOID WIREGUIDED  2.5CM M00510880

## (undated) DEVICE — WIRE GUIDE 0.025"X270CM STR VISIGLIDE G-240-2527S

## (undated) DEVICE — TUBING SUCTION 10'X3/16" N510

## (undated) DEVICE — LINEN TOWEL PACK X30 5481

## (undated) DEVICE — PACK ENDOSCOPY GI CUSTOM UMMC

## (undated) DEVICE — SU VICRYL 4-0 PS-2 18" UND J496H

## (undated) DEVICE — ESU GROUND PAD ADULT W/CORD E7507

## (undated) DEVICE — SYR 10ML LL W/O NDL 302995

## (undated) DEVICE — BIOPSY VALVE BIOSHIELD 00711135

## (undated) DEVICE — ENDO FUSION OMNI-TOME 21 FS-OMNI-21 G48675

## (undated) DEVICE — SOL NACL 0.9% IRRIG 1000ML BOTTLE 2F7124

## (undated) DEVICE — CATH SPYSCOPE DIGITAL ACCESS DS DISP M00546600

## (undated) DEVICE — PREP CHLORAPREP 26ML TINTED ORANGE  260815

## (undated) DEVICE — SOL WATER IRRIG 1000ML BOTTLE 2F7114

## (undated) DEVICE — Device

## (undated) DEVICE — CATH RETRIEVAL BALLOON EXTRACTOR PRO RX-S INJ ABOVE 9-12MM

## (undated) DEVICE — CATH CHOLANGIOGRAM 7.5FR TAUT PLASTIC TIP 20018-010

## (undated) DEVICE — SPONGE LAP 18X18" X8435

## (undated) DEVICE — GUIDEWIRE NOVAGOLD .018X260CM STR TIP M00552000

## (undated) DEVICE — SUCTION TIP POOLE K770

## (undated) DEVICE — ESU ENDO SCISSORS 5MM CVD 5DCS

## (undated) DEVICE — DRAIN JACKSON PRATT ROUND SIL 19FR W/TROCAR LF JP-2232

## (undated) DEVICE — INTR ENDOSCOPIC STENT FUSION OASIS 09.0FRX200CM

## (undated) DEVICE — DRSG STERI STRIP 1/2X4" R1547

## (undated) DEVICE — ENDO CATH BALLOON EXTRACTOR PRO RX 15-18MM 4702

## (undated) DEVICE — ENDO TROCAR BLUNT TIP KII BALLOON 12X130MM C0R50

## (undated) DEVICE — DRSG MEDIPORE 3 1/2X13 3/4" 3573

## (undated) DEVICE — DRAPE C-ARM W/STRAPS 42X72" 07-CA104

## (undated) DEVICE — DRAIN JACKSON PRATT RESERVOIR 100ML SU130-1305

## (undated) DEVICE — ENDO FUSION OMNI-TOME G31903

## (undated) DEVICE — KIT ENDO FIRST STEP DISINFECTANT 200ML W/POUCH EP-4

## (undated) DEVICE — SU ETHILON 3-0 PS-1 18" 1663H

## (undated) DEVICE — STPL SKIN 35W ROTATING HEAD PRW35

## (undated) DEVICE — ENDO CLOSING KIT ENDOCLOSE 173022

## (undated) DEVICE — CATH TRAY FOLEY SURESTEP 16FR W/URINE MTR STATLK LF A303416A

## (undated) DEVICE — SUCTION TIP YANKAUER STR K87

## (undated) DEVICE — ESU ELEC BLADE 6" COATED E1450-6

## (undated) DEVICE — TAPE MEASURE PAPER 36" LF NI14-1300

## (undated) DEVICE — ENDO POUCH UNIV RETRIEVAL SYSTEM INZII 10MM CD001

## (undated) DEVICE — LINEN TOWEL PACK X6 WHITE 5487

## (undated) DEVICE — PROBE LITHOTRIPTOR 1.9MM FOR SPYGLASS 9-195-371DS

## (undated) DEVICE — ANTIFOG SOLUTION W/FOAM PAD 31142527

## (undated) RX ORDER — HYDROMORPHONE HYDROCHLORIDE 1 MG/ML
INJECTION, SOLUTION INTRAMUSCULAR; INTRAVENOUS; SUBCUTANEOUS
Status: DISPENSED
Start: 2018-10-10

## (undated) RX ORDER — PROPOFOL 10 MG/ML
INJECTION, EMULSION INTRAVENOUS
Status: DISPENSED
Start: 2018-10-10

## (undated) RX ORDER — OXYMETAZOLINE HYDROCHLORIDE 0.05 G/100ML
SPRAY NASAL
Status: DISPENSED
Start: 2018-10-10

## (undated) RX ORDER — SIMETHICONE 40MG/0.6ML
SUSPENSION, DROPS(FINAL DOSAGE FORM)(ML) ORAL
Status: DISPENSED
Start: 2018-11-29

## (undated) RX ORDER — FENTANYL CITRATE 50 UG/ML
INJECTION, SOLUTION INTRAMUSCULAR; INTRAVENOUS
Status: DISPENSED
Start: 2018-10-10

## (undated) RX ORDER — PROPOFOL 10 MG/ML
INJECTION, EMULSION INTRAVENOUS
Status: DISPENSED
Start: 2018-11-29

## (undated) RX ORDER — CEFAZOLIN SODIUM 1 G/3ML
INJECTION, POWDER, FOR SOLUTION INTRAMUSCULAR; INTRAVENOUS
Status: DISPENSED
Start: 2018-10-10

## (undated) RX ORDER — PHENYLEPHRINE HCL IN 0.9% NACL 1 MG/10 ML
SYRINGE (ML) INTRAVENOUS
Status: DISPENSED
Start: 2018-09-13

## (undated) RX ORDER — PHENYLEPHRINE HCL IN 0.9% NACL 1 MG/10 ML
SYRINGE (ML) INTRAVENOUS
Status: DISPENSED
Start: 2018-10-10

## (undated) RX ORDER — ONDANSETRON 2 MG/ML
INJECTION INTRAMUSCULAR; INTRAVENOUS
Status: DISPENSED
Start: 2018-08-22

## (undated) RX ORDER — CEFAZOLIN SODIUM 2 G/100ML
INJECTION, SOLUTION INTRAVENOUS
Status: DISPENSED
Start: 2018-10-10

## (undated) RX ORDER — IOPAMIDOL 510 MG/ML
INJECTION, SOLUTION INTRAVASCULAR
Status: DISPENSED
Start: 2018-08-22

## (undated) RX ORDER — PROPOFOL 10 MG/ML
INJECTION, EMULSION INTRAVENOUS
Status: DISPENSED
Start: 2018-08-22

## (undated) RX ORDER — EPHEDRINE SULFATE 50 MG/ML
INJECTION, SOLUTION INTRAMUSCULAR; INTRAVENOUS; SUBCUTANEOUS
Status: DISPENSED
Start: 2018-08-22

## (undated) RX ORDER — FENTANYL CITRATE 50 UG/ML
INJECTION, SOLUTION INTRAMUSCULAR; INTRAVENOUS
Status: DISPENSED
Start: 2018-09-13

## (undated) RX ORDER — LIDOCAINE HYDROCHLORIDE 20 MG/ML
INJECTION, SOLUTION EPIDURAL; INFILTRATION; INTRACAUDAL; PERINEURAL
Status: DISPENSED
Start: 2018-11-29

## (undated) RX ORDER — INDOMETHACIN 50 MG/1
SUPPOSITORY RECTAL
Status: DISPENSED
Start: 2018-11-29

## (undated) RX ORDER — HYDRALAZINE HYDROCHLORIDE 20 MG/ML
INJECTION INTRAMUSCULAR; INTRAVENOUS
Status: DISPENSED
Start: 2018-10-10

## (undated) RX ORDER — LIDOCAINE HYDROCHLORIDE 20 MG/ML
INJECTION, SOLUTION EPIDURAL; INFILTRATION; INTRACAUDAL; PERINEURAL
Status: DISPENSED
Start: 2018-10-10

## (undated) RX ORDER — ESMOLOL HYDROCHLORIDE 10 MG/ML
INJECTION INTRAVENOUS
Status: DISPENSED
Start: 2018-08-22

## (undated) RX ORDER — DEXAMETHASONE SODIUM PHOSPHATE 4 MG/ML
INJECTION, SOLUTION INTRA-ARTICULAR; INTRALESIONAL; INTRAMUSCULAR; INTRAVENOUS; SOFT TISSUE
Status: DISPENSED
Start: 2018-10-10

## (undated) RX ORDER — FUROSEMIDE 10 MG/ML
INJECTION INTRAMUSCULAR; INTRAVENOUS
Status: DISPENSED
Start: 2018-10-10

## (undated) RX ORDER — FENTANYL CITRATE 50 UG/ML
INJECTION, SOLUTION INTRAMUSCULAR; INTRAVENOUS
Status: DISPENSED
Start: 2018-11-29

## (undated) RX ORDER — ONDANSETRON 2 MG/ML
INJECTION INTRAMUSCULAR; INTRAVENOUS
Status: DISPENSED
Start: 2018-10-10

## (undated) RX ORDER — EPHEDRINE SULFATE 50 MG/ML
INJECTION, SOLUTION INTRAMUSCULAR; INTRAVENOUS; SUBCUTANEOUS
Status: DISPENSED
Start: 2018-10-10

## (undated) RX ORDER — WATER 10 ML/10ML
INJECTION INTRAMUSCULAR; INTRAVENOUS; SUBCUTANEOUS
Status: DISPENSED
Start: 2018-08-22

## (undated) RX ORDER — GLYCOPYRROLATE 0.2 MG/ML
INJECTION, SOLUTION INTRAMUSCULAR; INTRAVENOUS
Status: DISPENSED
Start: 2018-08-22

## (undated) RX ORDER — INDOMETHACIN 50 MG/1
SUPPOSITORY RECTAL
Status: DISPENSED
Start: 2018-09-13

## (undated) RX ORDER — LIDOCAINE HYDROCHLORIDE AND EPINEPHRINE 10; 10 MG/ML; UG/ML
INJECTION, SOLUTION INFILTRATION; PERINEURAL
Status: DISPENSED
Start: 2018-10-10

## (undated) RX ORDER — ALBUTEROL SULFATE 90 UG/1
AEROSOL, METERED RESPIRATORY (INHALATION)
Status: DISPENSED
Start: 2018-08-22

## (undated) RX ORDER — ONDANSETRON 2 MG/ML
INJECTION INTRAMUSCULAR; INTRAVENOUS
Status: DISPENSED
Start: 2018-11-29

## (undated) RX ORDER — SODIUM CHLORIDE, SODIUM LACTATE, POTASSIUM CHLORIDE, CALCIUM CHLORIDE 600; 310; 30; 20 MG/100ML; MG/100ML; MG/100ML; MG/100ML
INJECTION, SOLUTION INTRAVENOUS
Status: DISPENSED
Start: 2018-10-10

## (undated) RX ORDER — FENTANYL CITRATE 50 UG/ML
INJECTION, SOLUTION INTRAMUSCULAR; INTRAVENOUS
Status: DISPENSED
Start: 2018-08-22

## (undated) RX ORDER — GLYCOPYRROLATE 0.2 MG/ML
INJECTION, SOLUTION INTRAMUSCULAR; INTRAVENOUS
Status: DISPENSED
Start: 2018-10-10